# Patient Record
Sex: MALE
[De-identification: names, ages, dates, MRNs, and addresses within clinical notes are randomized per-mention and may not be internally consistent; named-entity substitution may affect disease eponyms.]

---

## 2018-03-01 ENCOUNTER — HOSPITAL ENCOUNTER (INPATIENT)
Dept: HOSPITAL 88 - ER | Age: 73
LOS: 3 days | Discharge: HOME | DRG: 690 | End: 2018-03-04
Attending: INTERNAL MEDICINE | Admitting: INTERNAL MEDICINE
Payer: MEDICARE

## 2018-03-01 VITALS — HEIGHT: 69 IN | WEIGHT: 265 LBS | BODY MASS INDEX: 39.25 KG/M2

## 2018-03-01 DIAGNOSIS — E86.0: ICD-10-CM

## 2018-03-01 DIAGNOSIS — I25.10: ICD-10-CM

## 2018-03-01 DIAGNOSIS — N17.9: ICD-10-CM

## 2018-03-01 DIAGNOSIS — N39.0: Primary | ICD-10-CM

## 2018-03-01 DIAGNOSIS — I11.9: ICD-10-CM

## 2018-03-01 DIAGNOSIS — E66.9: ICD-10-CM

## 2018-03-01 DIAGNOSIS — E11.65: ICD-10-CM

## 2018-03-01 DIAGNOSIS — I65.29: ICD-10-CM

## 2018-03-01 DIAGNOSIS — I73.9: ICD-10-CM

## 2018-03-01 LAB
ALBUMIN SERPL-MCNC: 3.8 G/DL (ref 3.5–5)
ALBUMIN/GLOB SERPL: 1.1 {RATIO} (ref 0.8–2)
ALP SERPL-CCNC: 46 IU/L (ref 40–150)
ALT SERPL-CCNC: 27 IU/L (ref 0–55)
ANION GAP SERPL CALC-SCNC: 17.7 MMOL/L (ref 8–16)
BASOPHILS # BLD AUTO: 0 10*3/UL (ref 0–0.1)
BASOPHILS NFR BLD AUTO: 0.3 % (ref 0–1)
BUN SERPL-MCNC: 30 MG/DL (ref 7–26)
BUN/CREAT SERPL: 16 (ref 6–25)
CALCIUM SERPL-MCNC: 10.5 MG/DL (ref 8.4–10.2)
CHLORIDE SERPL-SCNC: 103 MMOL/L (ref 98–107)
CK MB SERPL-MCNC: 5.2 NG/ML (ref 0–5)
CK MB SERPL-MCNC: 5.4 NG/ML (ref 0–5)
CK SERPL-CCNC: 182 IU/L (ref 30–200)
CK SERPL-CCNC: 193 IU/L (ref 30–200)
CO2 SERPL-SCNC: 26 MMOL/L (ref 22–29)
DEPRECATED APTT PLAS QN: 24.9 SECONDS (ref 23.8–35.5)
DEPRECATED INR PLAS: 1.09
DEPRECATED NEUTROPHILS # BLD AUTO: 10.8 10*3/UL (ref 2.1–6.9)
EGFRCR SERPLBLD CKD-EPI 2021: 35 ML/MIN (ref 60–?)
EOSINOPHIL # BLD AUTO: 0.1 10*3/UL (ref 0–0.4)
EOSINOPHIL NFR BLD AUTO: 0.4 % (ref 0–6)
ERYTHROCYTE [DISTWIDTH] IN CORD BLOOD: 13.7 % (ref 11.7–14.4)
GLOBULIN PLAS-MCNC: 3.6 G/DL (ref 2.3–3.5)
GLUCOSE SERPLBLD-MCNC: 137 MG/DL (ref 74–118)
HCT VFR BLD AUTO: 50 % (ref 38.2–49.6)
HGB BLD-MCNC: 16.9 G/DL (ref 14–18)
LYMPHOCYTES # BLD: 1.8 10*3/UL (ref 1–3.2)
LYMPHOCYTES NFR BLD AUTO: 12.8 % (ref 18–39.1)
MAGNESIUM SERPL-MCNC: 1.9 MG/DL (ref 1.3–2.1)
MCH RBC QN AUTO: 32.1 PG (ref 28–32)
MCHC RBC AUTO-ENTMCNC: 33.8 G/DL (ref 31–35)
MCV RBC AUTO: 95.1 FL (ref 81–99)
MONOCYTES # BLD AUTO: 1 10*3/UL (ref 0.2–0.8)
MONOCYTES NFR BLD AUTO: 7 % (ref 4.4–11.3)
NEUTS SEG NFR BLD AUTO: 78.9 % (ref 38.7–80)
PLATELET # BLD AUTO: 319 X10E3/UL (ref 140–360)
POTASSIUM SERPL-SCNC: 4.7 MMOL/L (ref 3.5–5.1)
PROTHROMBIN TIME: 13.3 SECONDS (ref 11.9–14.5)
RBC # BLD AUTO: 5.26 X10E6/UL (ref 4.3–5.7)
SODIUM SERPL-SCNC: 142 MMOL/L (ref 136–145)
TSH SERPL DL<=0.005 MIU/L-ACNC: 4.86 UIU/ML (ref 0.35–4.94)

## 2018-03-01 PROCEDURE — 85379 FIBRIN DEGRADATION QUANT: CPT

## 2018-03-01 PROCEDURE — 96372 THER/PROPH/DIAG INJ SC/IM: CPT

## 2018-03-01 PROCEDURE — 94640 AIRWAY INHALATION TREATMENT: CPT

## 2018-03-01 PROCEDURE — 97139 UNLISTED THERAPEUTIC PX: CPT

## 2018-03-01 PROCEDURE — 85025 COMPLETE CBC W/AUTO DIFF WBC: CPT

## 2018-03-01 PROCEDURE — 82550 ASSAY OF CK (CPK): CPT

## 2018-03-01 PROCEDURE — 86850 RBC ANTIBODY SCREEN: CPT

## 2018-03-01 PROCEDURE — 83880 ASSAY OF NATRIURETIC PEPTIDE: CPT

## 2018-03-01 PROCEDURE — 81001 URINALYSIS AUTO W/SCOPE: CPT

## 2018-03-01 PROCEDURE — 70551 MRI BRAIN STEM W/O DYE: CPT

## 2018-03-01 PROCEDURE — 71046 X-RAY EXAM CHEST 2 VIEWS: CPT

## 2018-03-01 PROCEDURE — 93005 ELECTROCARDIOGRAM TRACING: CPT

## 2018-03-01 PROCEDURE — 84439 ASSAY OF FREE THYROXINE: CPT

## 2018-03-01 PROCEDURE — 82948 REAGENT STRIP/BLOOD GLUCOSE: CPT

## 2018-03-01 PROCEDURE — 87186 SC STD MICRODIL/AGAR DIL: CPT

## 2018-03-01 PROCEDURE — 87086 URINE CULTURE/COLONY COUNT: CPT

## 2018-03-01 PROCEDURE — 85610 PROTHROMBIN TIME: CPT

## 2018-03-01 PROCEDURE — 83036 HEMOGLOBIN GLYCOSYLATED A1C: CPT

## 2018-03-01 PROCEDURE — 99284 EMERGENCY DEPT VISIT MOD MDM: CPT

## 2018-03-01 PROCEDURE — 93880 EXTRACRANIAL BILAT STUDY: CPT

## 2018-03-01 PROCEDURE — 85730 THROMBOPLASTIN TIME PARTIAL: CPT

## 2018-03-01 PROCEDURE — 82553 CREATINE MB FRACTION: CPT

## 2018-03-01 PROCEDURE — 86900 BLOOD TYPING SEROLOGIC ABO: CPT

## 2018-03-01 PROCEDURE — 80053 COMPREHEN METABOLIC PANEL: CPT

## 2018-03-01 PROCEDURE — 80048 BASIC METABOLIC PNL TOTAL CA: CPT

## 2018-03-01 PROCEDURE — 84484 ASSAY OF TROPONIN QUANT: CPT

## 2018-03-01 PROCEDURE — 87400 INFLUENZA A/B EACH AG IA: CPT

## 2018-03-01 PROCEDURE — 36415 COLL VENOUS BLD VENIPUNCTURE: CPT

## 2018-03-01 PROCEDURE — 82270 OCCULT BLOOD FECES: CPT

## 2018-03-01 PROCEDURE — 84443 ASSAY THYROID STIM HORMONE: CPT

## 2018-03-01 PROCEDURE — 83735 ASSAY OF MAGNESIUM: CPT

## 2018-03-01 PROCEDURE — 93306 TTE W/DOPPLER COMPLETE: CPT

## 2018-03-01 PROCEDURE — 71045 X-RAY EXAM CHEST 1 VIEW: CPT

## 2018-03-01 PROCEDURE — 96367 TX/PROPH/DG ADDL SEQ IV INF: CPT

## 2018-03-01 RX ADMIN — SODIUM CHLORIDE SCH MLS/HR: 9 INJECTION, SOLUTION INTRAVENOUS at 20:05

## 2018-03-01 RX ADMIN — INSULIN HUMAN SCH UNIT: 100 INJECTION, SOLUTION PARENTERAL at 22:30

## 2018-03-01 NOTE — XMS REPORT
Patient Summary Document

 Created on: 2018



ANDREW DONALDSON

External Reference #: [id number]

: 1945

Sex: Male



Demographics







 Address  [address] [place]  [address]

 

 Home Phone  [phone]

 

 Preferred Language  Unknown

 

 Marital Status  Unknown

 

 Judaism Affiliation  Unknown

 

 Race  Unknown

 

 Ethnic Group  Unknown





Author







 Author  [facility]

 

 Address  Unknown

 

 Phone  Unavailable







Care Team Providers







 Care Team Member Name  Role  Phone

 

 ROSA OKEEFE  Unavailable  Unavailable







Problems

This patient has no known problems.



Allergies, Adverse Reactions, Alerts

This patient has no known allergies or adverse reactions.



Medications

This patient has no known medications.



Results







 Test Description  Test Time  Test Comments  Text Results  Atomic Results  
Result Comments









 CHEST SINGLE (PORTABLE)            [place] [address]      Patient Name: ANDREW DONALDSON JR   MR #: [id number]    : 1945 Age/Sex: 72/M  Acct #: 
[id number] Req #: [id number]  Adm Physician:     Ordered by: ANDREW BARRAGAN 
NP  Report #: [id number]   Location: ER  Room/Bed:     __________________________
_________________________________________________________________________    
Procedure: [id number] DX/CHEST SINGLE (PORTABLE)  Exam Date: 18           
                 Exam Time: 1155       REPORT STATUS: Signed    PROCEDURE:   
CHEST SINGLE (PORTABLE)   TECHNIQUE:   Portable AP chest   INDICATION:   Low 
blood pressure   COMPARISON:   None.       FINDINGS:   Lungs are clear and 
symmetrically inflated. No pleural effusions.    Normal heart size, mediastinal 
contour, and pulmonary vasculature for    technique. Mild aortic arch 
calcification. Grossly intact skeleton.           CONCLUSION:   No acute 
abnormality.               Dictated by:  Gianluca Montilla M.D. on 3/01/2018 
at 12:25        Electronically approved by:  Gianluca Montilla M.D. on 3/01/
2018 at    12:25                Dictated By: GIANLUCA MONTILLA MD  Electronically 
Signed By: GIANLUCA MONTILLA MD on 18 1226  Transcribed By: LINDSAY on  1226       COPY TO:   ANDREW BARRAGAN NP

## 2018-03-01 NOTE — CONSULTATION
DATE OF CONSULTATION:    



No Dictation 00:03 seconds.

 





DD:  03/01/2018 20:32

DT:  03/01/2018 20:58

Job#:  [id number]

## 2018-03-01 NOTE — DIAGNOSTIC IMAGING REPORT
PROCEDURE:CHEST SINGLE (PORTABLE)

TECHNIQUE:Portable AP chest

INDICATION:Low blood pressure

COMPARISON:None.

 

FINDINGS:

Lungs are clear and symmetrically inflated. No pleural effusions. 

Normal heart size, mediastinal contour, and pulmonary vasculature for 

technique. Mild aortic arch calcification. Grossly intact skeleton.

 

 

CONCLUSION:

No acute abnormality.

 

 

 

Dictated by:  Elvis Montilla M.D. on 3/01/2018 at 12:25     

Electronically approved by:  Elvis Montilla M.D. on 3/01/2018 at 

12:25

## 2018-03-02 VITALS — SYSTOLIC BLOOD PRESSURE: 127 MMHG | DIASTOLIC BLOOD PRESSURE: 77 MMHG

## 2018-03-02 VITALS — DIASTOLIC BLOOD PRESSURE: 74 MMHG | SYSTOLIC BLOOD PRESSURE: 133 MMHG

## 2018-03-02 VITALS — SYSTOLIC BLOOD PRESSURE: 138 MMHG | DIASTOLIC BLOOD PRESSURE: 74 MMHG

## 2018-03-02 VITALS — SYSTOLIC BLOOD PRESSURE: 141 MMHG | DIASTOLIC BLOOD PRESSURE: 82 MMHG

## 2018-03-02 VITALS — SYSTOLIC BLOOD PRESSURE: 120 MMHG | DIASTOLIC BLOOD PRESSURE: 70 MMHG

## 2018-03-02 VITALS — SYSTOLIC BLOOD PRESSURE: 133 MMHG | DIASTOLIC BLOOD PRESSURE: 66 MMHG

## 2018-03-02 LAB
ANION GAP SERPL CALC-SCNC: 16.3 MMOL/L (ref 8–16)
BACTERIA URNS QL MICRO: (no result) /HPF
BASOPHILS # BLD AUTO: 0 10*3/UL (ref 0–0.1)
BASOPHILS NFR BLD AUTO: 0.4 % (ref 0–1)
BILIRUB UR QL: NEGATIVE
BUN SERPL-MCNC: 31 MG/DL (ref 7–26)
BUN/CREAT SERPL: 25 (ref 6–25)
CALCIUM SERPL-MCNC: 9.2 MG/DL (ref 8.4–10.2)
CHLORIDE SERPL-SCNC: 106 MMOL/L (ref 98–107)
CK MB SERPL-MCNC: 4.3 NG/ML (ref 0–5)
CK SERPL-CCNC: 236 IU/L (ref 30–200)
CLARITY UR: (no result)
CO2 SERPL-SCNC: 25 MMOL/L (ref 22–29)
COLOR UR: YELLOW
DEPRECATED NEUTROPHILS # BLD AUTO: 7.2 10*3/UL (ref 2.1–6.9)
EGFRCR SERPLBLD CKD-EPI 2021: 56 ML/MIN (ref 60–?)
EOSINOPHIL # BLD AUTO: 0.1 10*3/UL (ref 0–0.4)
EOSINOPHIL NFR BLD AUTO: 1.3 % (ref 0–6)
EPI CELLS URNS QL MICRO: (no result) /LPF
ERYTHROCYTE [DISTWIDTH] IN CORD BLOOD: 14 % (ref 11.7–14.4)
GLUCOSE SERPLBLD-MCNC: 157 MG/DL (ref 74–118)
HCT VFR BLD AUTO: 45 % (ref 38.2–49.6)
HGB BLD-MCNC: 14.5 G/DL (ref 14–18)
KETONES UR QL STRIP.AUTO: NEGATIVE
LEUKOCYTE ESTERASE UR QL STRIP.AUTO: (no result)
LYMPHOCYTES # BLD: 1.7 10*3/UL (ref 1–3.2)
LYMPHOCYTES NFR BLD AUTO: 16.2 % (ref 18–39.1)
MCH RBC QN AUTO: 32.1 PG (ref 28–32)
MCHC RBC AUTO-ENTMCNC: 32.2 G/DL (ref 31–35)
MCV RBC AUTO: 99.6 FL (ref 81–99)
MONOCYTES # BLD AUTO: 1.1 10*3/UL (ref 0.2–0.8)
MONOCYTES NFR BLD AUTO: 10.8 % (ref 4.4–11.3)
NEUTS SEG NFR BLD AUTO: 70.2 % (ref 38.7–80)
NITRITE UR QL STRIP.AUTO: POSITIVE
PLATELET # BLD AUTO: 269 X10E3/UL (ref 140–360)
POTASSIUM SERPL-SCNC: 5.3 MMOL/L (ref 3.5–5.1)
PROT UR QL STRIP.AUTO: (no result)
RBC # BLD AUTO: 4.52 X10E6/UL (ref 4.3–5.7)
SODIUM SERPL-SCNC: 142 MMOL/L (ref 136–145)
SP GR UR STRIP: 1.02 (ref 1.01–1.02)
T4 FREE SERPL-MCNC: 0.99 NG/DL (ref 0.9–1.8)
TSH SERPL DL<=0.005 MIU/L-ACNC: 0.8 UIU/ML (ref 0.35–4.94)
UROBILINOGEN UR STRIP-MCNC: 0.2 MG/DL (ref 0.2–1)
WBC #/AREA URNS HPF: >50 /HPF (ref 0–5)

## 2018-03-02 RX ADMIN — INSULIN LISPRO SCH UNIT: 100 INJECTION, SOLUTION INTRAVENOUS; SUBCUTANEOUS at 21:00

## 2018-03-02 RX ADMIN — INSULIN HUMAN SCH UNIT: 100 INJECTION, SOLUTION PARENTERAL at 07:54

## 2018-03-02 RX ADMIN — SIMVASTATIN SCH MG: 20 TABLET, FILM COATED ORAL at 21:00

## 2018-03-02 RX ADMIN — SODIUM CHLORIDE SCH GM: 9 INJECTION, SOLUTION INTRAVENOUS at 21:00

## 2018-03-02 RX ADMIN — SODIUM CHLORIDE SCH MLS/HR: 9 INJECTION, SOLUTION INTRAVENOUS at 02:30

## 2018-03-02 RX ADMIN — INSULIN LISPRO SCH UNIT: 100 INJECTION, SOLUTION INTRAVENOUS; SUBCUTANEOUS at 16:18

## 2018-03-02 RX ADMIN — INSULIN HUMAN SCH UNIT: 100 INJECTION, SOLUTION PARENTERAL at 12:41

## 2018-03-02 RX ADMIN — SODIUM CHLORIDE SCH GM: 9 INJECTION, SOLUTION INTRAVENOUS at 12:44

## 2018-03-02 NOTE — CONSULTATION
DATE OF CONSULTATION:  March 02, 2018 



ENDOCRINE CONSULTATION



This is a patient of Dr. Shearer.  Thank you very much for referring this 

patient.   



HISTORY OF PRESENT ILLNESS:  This is a 72-year-old white male who is 

referred to me for evaluation of diabetes mellitus.  The patient reported 

he is a known diabetic for almost 12 years and takes a combination of 

Levemir insulin 100 units at bedtime and 30 to 60 units of Humalog with 

each meal depending upon the blood sugars.  Patient came to the hospital 

with a history of syncopal episode and was found to have a urinary tract 

infection.  Patient also has history of coronary artery disease, congestive 

cardiac failure and stent placement.  He also has history of 

hyperlipidemia.  Patient also takes metformin at home.   



PHYSICAL EXAMINATION:  

GENERAL:  Today the patient is alert, awake, a little bit apprehensive.  He 

is moderately overweight.

VITAL SIGNS:  His heart rate is around 78.  His blood pressure is 140/80 

mmHg.

HEENT:  Examination essentially unremarkable.  Thyroid is palpable.  

Clinically he is near euthyroid. 

CHEST:  Bilateral vesicular breathing.  He has mild bronchospasm.  

CARDIAC:  Both 1st and 2nd heart sounds.  There is no 3rd or 4th heart 

sound.  Ejection sound grade 2/6.  

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 

lower extremities.



CLINICAL IMPRESSION:  

1. Diabetes mellitus type 2, uncontrolled with complications.  

2. Syncopal episode.

3. Hypotension.

4. Urinary tract infection.  

5. Coronary artery disease and congestive cardiac failure. 



The plan at this time is to do a hemoglobin A1c, thyroid function test.  

Hold the metformin for now because his creatinine was significantly 

elevated at the time of admission.  Will just start him on the Humalog and 

the Levemir combination but much lower dose.  



Thanks for referring this patient.  I will be following this patient with 

you.



 





DD:  03/02/2018 14:02

DT:  03/02/2018 14:08

Job#:  [id number]







VALERY

## 2018-03-02 NOTE — CONSULTATION
DATE OF CONSULTATION:  March 02, 2018, at 2:15 in the evening.



NEUROLOGICAL CONSULTATION 



This is a patient of Dr. Deborah Shearer.



REASON FOR CONSULTATION:  Hypertension, presyncope.



This is a 72-year-old male who has a many-year history of diabetes.  In the 

last several days, he started feeling generalized weak.  Because of that, 

they brought him to the hospital for further evaluation and management.  

The blood pressure apparently was low in the 70s.  The patient feels like 

he is going to black out but never passes out.  



PAST HISTORY:  He denies history of hypertension.  He has a history of 

diabetes mellitus and heart condition.  He has a history of coronary artery 

stent and carotid artery stent.  He has also a history of bilateral 

cataract surgery and tonsillectomy.



PERSONAL AND SOCIAL HISTORY:  He lives alone.  He does not smoke or drink.



PHYSICAL EXAMINATION 

VITAL SIGNS:  At the time of this examination, blood pressure is 141/82, 

pulse 78, temperature 100.5.  The patient is overweight.  He weighs about 

265 pounds.

LUNGS:  Clear to auscultation. 

HEART:  Regular sinus rhythm.  No murmur.

ABDOMEN:  Soft and nontender.  No organomegaly.  

MUSCULOSKELETAL:  Lower extremities have no edema, no cyanosis and no 

clubbing.



NEUROLOGIC EXAMINATION:  He is alert.  He is oriented times 3.  Speech is 

clear.  No dysarthria.  No dysphagia.

CRANIAL NERVES:  Pupils are both equal and reactive.  The extraocular 

movements are full.  Visual fields are normal.  No facial weakness.  Facial 

sensation noted.  Tongue protrudes in the midline.  Palatal movement is 

normal bilaterally.

MOTOR POWER:  Upper extremities show no evidence of muscle wasting in 

either the proximal or distal muscles.  Abduction of the arms 5/5.  Flexion 

and extension of the arms 5/5. Dorsiflexion of the wrists 5/5.  Finger 

extension 5/5.  Hand  5/5.  Lower extremities:  Flexion of the hips 

5/5.  Flexion and extension of the knees 5/5.  Dorsiflexion of the ankles 

5/5.  Plantar flexion 5/5.  Toe extension 5/5 bilaterally.  

DEEP TENDON REFLEXES:  Triceps, biceps and radials are 1+.  Knee jerks 

absent.  Ankle jerks absent bilaterally.  Plantar stimulation is down 

bilaterally.

COORDINATION:  Finger-to-nose is normal.  The patient has been ambulating 

with help of physical therapy today.  

HEAD:  Normocephalic. 

NECK:  Supple.  Carotid pulsations are present bilaterally.  There are no 

bruits.  



LABORATORY WORKUP:  CBC shows white count 10,200 with hemoglobin 14.5, 

hematocrit 45, platelets 269,000.  Chemistry:  Sodium and potassium normal. 

 BUN 31.8, creatinine 1.26.  Glucose 157.  Calcium 9.2.  Liver enzymes are 

normal.  



Urinalysis:  Protein 1+, blood 4+, number of WBCs greater than 50, bacteria 

moderately high.



MRI of the brain shows no evidence of acute pathology.  No acute infarction 

or acute hemorrhage.  There is moderate small vessel disease seen 

bilaterally.  



MRI of the brain, as mentioned, shows no evidence of acute pathology.  



Carotid Doppler shows no evidence of any major stenosis.  



IMPRESSION 

1. Episode of hypertension.

2. Rule out hypoglycemia.

3. Presyncope.

4. Diabetes mellitus, type 2.

5. Obesity.

6. Status post right carotid stent and coronary artery stent.



We discussed with the patient.  I do not think we are dealing with any 

cerebrovascular problem, no symptoms of TIA, no symptoms of possibility of 

stroke.  The patient was instructed to follow the advice, take his 

medications regularly, follow a diet to control the blood sugar and undergo 

any required laboratory evaluation.





 





DD:  03/02/2018 15:10

DT:  03/02/2018 15:16

Job#:  [id number]

## 2018-03-02 NOTE — HISTORY AND PHYSICAL
CHIEF COMPLAINT:  Dizziness, syncope.



HPI:  Mr. Bryant is a 72-year-old male who presented to the emergency room 

with episode of dizziness and syncope and blackout.  He has been having 

difficulty balancing himself for last few days, but today he had this near 

syncopal episode, so he decided to come to the emergency room.  He has a 

history of diabetes, hypertension, and coronary artery disease.  He also 

has carotid stents, and his cardiologist has told him that he has 

peripheral arterial disease as well.  He denies any chest pain, nausea, 

vomiting, diarrhea.



REVIEW OF SYSTEMS:

GENERAL:  Denies any fever or chills.

HEAD:  Denies any head trauma, head injury.

ENT:  Denies any earache, nosebleed, throat pain.

CVS:  Denies any chest pain.

RESPIRATORY:  Denies any shortness of breath.

GI:  Denies any nausea, vomiting.



The rest of the review of systems is negative except as in HPI.



PAST MEDICAL HISTORY:  Hypertension, coronary artery disease.



PAST SURGICAL HISTORY:  Knee replacement and tonsillectomy.



FAMILY AND SOCIAL HISTORY:  He lives by himself.  He is retired insurance 

.  He never smoked in his life.  Denies any alcohol use.  Family 

history is significant for hypertension and heart disease.



PHYSICAL EXAMINATION:

VITAL SIGNS:  Temperature 97.6, pulse is 78, blood pressure 127/67, 

respiratory rate of 18, O2 sat 96% on 2 liters.

SKIN:  Warm and dry.

CHEST:  Clear to auscultation bilaterally.  No wheezing.

NECK:  Supple.  No JVD.  Thyroid not enlarged.  No cervical 

lymphadenopathy.

ABDOMEN:  Soft, nontender, nondistended.  Bowel sounds audible.  No 

hepatosplenomegaly.

EXTREMITIES:  No clubbing, cyanosis.  Trace pedal edema.

NEUROLOGICAL:  Awake and alert.  No focal neurologic deficits.  Cranial 

nerves II through XII intact.



LABS:  White count of 13,000; hemoglobin 16.9; platelets 319,000.  

Chemistry:  Sodium 142, potassium 4.7, chloride 103, bicarb 26, BUN 30, 

creatinine 1.89.  AST and ALT normal.  CK-MB 5.20.  .2.  INR is 

1.09.  Influenza is negative.  Chest x-ray is not showing any infiltrate.



ASSESSMENT:  Mr. Bryant is a 72-year-old male who presented to the emergency 

room with syncope.  Patient has history of coronary artery disease, could 

be cardiogenic versus neurogenic, episodes of dizziness and imbalance for 

last few days.



PLAN:

1. Neurology consult, cardiology consult.

2. MRI of the head.

3. Will continue the patient on IV hydration.  Patient's creatinine is 

1.89.  Will follow closely.  May need nephrology evaluation as well, and 

the symptoms can be due to dehydration.









DD:  03/01/2018 20:36

DT:  03/01/2018 20:42

Job#:  [id number]

## 2018-03-02 NOTE — CONSULTATION
DATE OF CONSULTATION:  March 02, 2018 



CARDIOLOGY CONSULTATION 



ATTENDING PHYSICIAN:  Dr. Shearer.



Thank you so much for asking me to see this nice man in consultation. 



Mr. Bryant is a pleasant 72-year-old man who has been retired for many years 

and has adult-onset diabetes.  



CHIEF COMPLAINT:  He presented to the emergency room.  He reports that on 

Wednesday he was in his ophthalmologist's office, felt weak and went home.  

By Thursday morning, he reports that he did not feel any better, feeling 

weak and decided to come to the emergency room where they found 

hypotension.  



HISTORY OF PRESENT ILLNESS:  Patient denies any chest pain, palpitations or 

carson syncope.  He denies any dysuria.



PAST MEDICAL HISTORY:  Is complex.  Longstanding diabetes that he reports 

that he had his diabetic medications changed in the last weeks and months 

due to insurance change.  He had previously been using Levemir and Humalog 

but changed these to a combination of Novolin N and regular insulin.  He 

was cautioned against low blood sugars and checks his fingerstick blood 

sugar about 4 times a day.  Past medical history otherwise significant for 

carotid stenting in 1995 and with a coronary stent in 2003, both performed 

at [facility] in [facility] by Dr. Francis.  He has 

had remote tonsillectomy and remote knee surgeries with arthroscope on the 

right knee and open left knee surgery 1977.  He reports that he has had 

bilateral cataract surgeries with the right one going well, but on the left 

one in May of 2016 he has had continued problems with poor healing of his 

cornea or sclera.  Takes multiple eyedrops and insulins as mentioned above 

plus metformin 500 mg b.i.d. and simvastatin 20 mg daily.



PERSONAL AND SOCIAL HISTORY:  He does not smoke nor drink.  He lives at 

home with his wife.



REVIEW OF SYSTEMS:   

CARDIAC:  He denies any chest pain or palpitations since his coronary 

stenting.

NEUROLOGIC:  He reports that before the carotid stenting he had amaurosis 

but no repeat since that time. 

 

PHYSICAL EXAMINATION:  

GENERAL:  At this time shows a pleasant, obese man who is 5 feet 9 inches 

tall and weighs 265 pounds, sitting in a chair. 

VITALS:  Blood pressure 130/80.  Temp is 99.8. 

HEENT:  Unremarkable. 

NECK:  No jugular venous distention, no bruits. 

THORAX:   Heart sounds S1 and S2 are equal.  No murmurs.  Lungs are clear. 

ABDOMEN:  Markedly protuberant.  Normal bowel sounds. 

EXTREMITIES:  Show the healed incision on the left knee but no cyanosis, 

clubbing or edema.  



EKG shows sinus rhythm with PVCs and nonspecific ST and T-wave changes.  

Echocardiogram shows good left ventricular function and mild left 

ventricular hypertrophy.  Urinalysis shows 11 to 20 red blood cells per 

high power field, greater than 50 white cells, with 4+ blood and positive 

for nitrites.  Cardiac enzymes are normal times 3.  BNP is elevated at 748. 

 TSH is normal. 



ASSESSMENT:  

1. Hypotensive episode with blood pressure recorded at 71/41 on 

presenting to the emergency room.

2. Urinary tract infection.

3. Type 2 adult-onset diabetes with complex regimen including metformin 

and insulin.

4. Carotid disease, clinically stable after carotid stenting.

5. Coronary disease, clinically stable after coronary stenting.  



PLAN:   Will await urine culture and check carotid Doppler scan and monitor 

on telemetry. 



Thank you for asking me to see him in consultation.





 





DD:  03/02/2018 11:22

DT:  03/02/2018 12:17

Job#:  [id number]

## 2018-03-02 NOTE — DIAGNOSTIC IMAGING REPORT
Examination: MRI BRAIN WITHOUT CONTRAST

History: Dizziness. Imbalance. Near syncope.

Comparison studies: None



Technique: 

Sagittal T2; axial DWI, FLAIR, GRE or SWI, T1, Coronal FLAIR.

Intravenous contrast: None



Findings:



Scalp: No abnormal signal.  No masses.

Bone marrow: Normal in signal intensity.



Brain volume: Mild volume loss volume loss.

Ventricles: Normal in size and configuration.   No hydrocephalus.  



Extra-axial spaces:

No abnormalities.



Parenchyma:

There are patchy and confluent areas of T2/FLAIR hyperintensity in the

periventricular and subcortical white matter, nonspecific. 

Chronic lacunar infarct (7.5 mm anteroposterior dimension) in the left lateral

putamen.

No masses, hemorrhage, or acute vascular insults.



Suprasellar and sellar region: No abnormalities.

Craniocervical junction: No abnormalities. The foramen magnum is patent. No

Chiari malformations.

Vessels: Normal flow-voids in the arteries and sinuses.



Additional findings:Bilateral slitlike orbital lenses.



IMPRESSION:



1.  No acute intracranial abnormalities.



2.  Mild volume loss and microvascular ischemic change.



3.  Chronic lacunar infarct of the left putamen.



Signed by: Dr. Sue Boyd M.D. on 3/2/2018 1:07 PM

## 2018-03-02 NOTE — DIAGNOSTIC IMAGING REPORT
PROCEDURE: CHEST SINGLE (PORTABLE)

COMPARISON: Patients [facility], DX, CHEST SINGLE (PORTABLE), 

3/01/2018, 11:58.

INDICATIONS: SHORTNESS OF BREATH

 

FINDINGS:



LUNGS: Exam is characterized by a poor inspiration. There is right 

basilar subsegmental atelectasis. No consolidation.

 

PLEURA: No effusions or pneumothorax.

 

HEART \T\ 

MEDIASTINUM: The heart is within normal size-limits.

 

BONES \T\

SOFT TISSUES:  No acute findings.

 

 

CONCLUSION:

Right basilar subsegmental atelectasis. 

 

 

Chon Chi D.O.  

Dictated by:  Chon Chi D.O. on 3/02/2018 at 14:21     

Electronically approved by:  Chon Chi D.O. on 3/02/2018 at 14:21

## 2018-03-03 VITALS — SYSTOLIC BLOOD PRESSURE: 149 MMHG | DIASTOLIC BLOOD PRESSURE: 87 MMHG

## 2018-03-03 VITALS — DIASTOLIC BLOOD PRESSURE: 79 MMHG | SYSTOLIC BLOOD PRESSURE: 123 MMHG

## 2018-03-03 VITALS — SYSTOLIC BLOOD PRESSURE: 155 MMHG | DIASTOLIC BLOOD PRESSURE: 90 MMHG

## 2018-03-03 VITALS — SYSTOLIC BLOOD PRESSURE: 112 MMHG | DIASTOLIC BLOOD PRESSURE: 72 MMHG

## 2018-03-03 VITALS — DIASTOLIC BLOOD PRESSURE: 88 MMHG | SYSTOLIC BLOOD PRESSURE: 167 MMHG

## 2018-03-03 VITALS — SYSTOLIC BLOOD PRESSURE: 143 MMHG | DIASTOLIC BLOOD PRESSURE: 79 MMHG

## 2018-03-03 LAB
ANION GAP SERPL CALC-SCNC: 12.5 MMOL/L (ref 8–16)
BASOPHILS # BLD AUTO: 0 10*3/UL (ref 0–0.1)
BASOPHILS NFR BLD AUTO: 0.3 % (ref 0–1)
BUN SERPL-MCNC: 27 MG/DL (ref 7–26)
BUN/CREAT SERPL: 29 (ref 6–25)
CALCIUM SERPL-MCNC: 9.2 MG/DL (ref 8.4–10.2)
CHLORIDE SERPL-SCNC: 105 MMOL/L (ref 98–107)
CO2 SERPL-SCNC: 27 MMOL/L (ref 22–29)
DEPRECATED NEUTROPHILS # BLD AUTO: 6.7 10*3/UL (ref 2.1–6.9)
EGFRCR SERPLBLD CKD-EPI 2021: > 60 ML/MIN (ref 60–?)
EOSINOPHIL # BLD AUTO: 0.2 10*3/UL (ref 0–0.4)
EOSINOPHIL NFR BLD AUTO: 2 % (ref 0–6)
ERYTHROCYTE [DISTWIDTH] IN CORD BLOOD: 13.8 % (ref 11.7–14.4)
GLUCOSE SERPLBLD-MCNC: 159 MG/DL (ref 74–118)
HCT VFR BLD AUTO: 45.3 % (ref 38.2–49.6)
HGB BLD-MCNC: 14.7 G/DL (ref 14–18)
LYMPHOCYTES # BLD: 1.5 10*3/UL (ref 1–3.2)
LYMPHOCYTES NFR BLD AUTO: 16.2 % (ref 18–39.1)
MCH RBC QN AUTO: 31.7 PG (ref 28–32)
MCHC RBC AUTO-ENTMCNC: 32.5 G/DL (ref 31–35)
MCV RBC AUTO: 97.6 FL (ref 81–99)
MONOCYTES # BLD AUTO: 0.9 10*3/UL (ref 0.2–0.8)
MONOCYTES NFR BLD AUTO: 9.4 % (ref 4.4–11.3)
NEUTS SEG NFR BLD AUTO: 71.6 % (ref 38.7–80)
PLATELET # BLD AUTO: 237 X10E3/UL (ref 140–360)
POTASSIUM SERPL-SCNC: 4.5 MMOL/L (ref 3.5–5.1)
RBC # BLD AUTO: 4.64 X10E6/UL (ref 4.3–5.7)
SODIUM SERPL-SCNC: 140 MMOL/L (ref 136–145)

## 2018-03-03 RX ADMIN — SODIUM CHLORIDE SCH GM: 9 INJECTION, SOLUTION INTRAVENOUS at 08:18

## 2018-03-03 RX ADMIN — SODIUM CHLORIDE SCH GM: 9 INJECTION, SOLUTION INTRAVENOUS at 20:50

## 2018-03-03 RX ADMIN — SIMVASTATIN SCH MG: 20 TABLET, FILM COATED ORAL at 20:51

## 2018-03-03 RX ADMIN — Medication SCH ML: at 19:06

## 2018-03-03 RX ADMIN — INSULIN LISPRO SCH UNIT: 100 INJECTION, SOLUTION INTRAVENOUS; SUBCUTANEOUS at 11:30

## 2018-03-03 RX ADMIN — INSULIN LISPRO SCH UNIT: 100 INJECTION, SOLUTION INTRAVENOUS; SUBCUTANEOUS at 07:30

## 2018-03-03 RX ADMIN — Medication SCH ML: at 13:00

## 2018-03-03 RX ADMIN — INSULIN LISPRO SCH UNIT: 100 INJECTION, SOLUTION INTRAVENOUS; SUBCUTANEOUS at 16:30

## 2018-03-03 RX ADMIN — INSULIN LISPRO SCH UNIT: 100 INJECTION, SOLUTION INTRAVENOUS; SUBCUTANEOUS at 20:51

## 2018-03-03 RX ADMIN — INSULIN LISPRO SCH UNIT: 100 INJECTION, SOLUTION INTRAVENOUS; SUBCUTANEOUS at 17:05

## 2018-03-03 NOTE — CARDIOLOGY REPORT
DATE OF STUDY:  March 02, 2018 



DOPPLER SCAN OF CAROTIDS  



Left carotid artery shows velocity 1.4 meters per second in the left distal 

internal carotid artery.  Left vertebral flow appears antegrade.



Right carotid artery shows a stent in the mid right internal carotid artery 

with velocity 1.8 meters per second consistent with mild stenosis.  Right 

vertebral flow appears to be antegrade.



CONCLUSIONS 

1. A stent is present in the right mid internal carotid artery with 

velocity of 1.84 meters per second consistent with mild to moderate 

stenosis in the range of 50% to 70%.

2. Mild stenosis involving the left distal internal carotid artery with 

velocity of 1.4 meters per second.  The degree of stenosis is once again 

in the range of 50% to 70%.

3. Borderline elevated velocity in the right external carotid artery 

with velocity of 1.3 meters per second.

4. Vertebral flow appears to be in normal direction bilaterally.  







DD:  03/03/2018 14:07

DT:  03/03/2018 15:49

Job#:  [id number] 



cc:AMY COATES M.D.

## 2018-03-04 VITALS — SYSTOLIC BLOOD PRESSURE: 153 MMHG | DIASTOLIC BLOOD PRESSURE: 67 MMHG

## 2018-03-04 VITALS — DIASTOLIC BLOOD PRESSURE: 87 MMHG | SYSTOLIC BLOOD PRESSURE: 149 MMHG

## 2018-03-04 VITALS — DIASTOLIC BLOOD PRESSURE: 97 MMHG | SYSTOLIC BLOOD PRESSURE: 169 MMHG

## 2018-03-04 VITALS — SYSTOLIC BLOOD PRESSURE: 149 MMHG | DIASTOLIC BLOOD PRESSURE: 87 MMHG

## 2018-03-04 RX ADMIN — INSULIN LISPRO SCH UNIT: 100 INJECTION, SOLUTION INTRAVENOUS; SUBCUTANEOUS at 11:30

## 2018-03-04 RX ADMIN — Medication SCH ML: at 02:40

## 2018-03-04 RX ADMIN — SODIUM CHLORIDE SCH GM: 9 INJECTION, SOLUTION INTRAVENOUS at 08:40

## 2018-03-04 RX ADMIN — Medication SCH ML: at 07:00

## 2018-03-04 RX ADMIN — INSULIN LISPRO SCH UNIT: 100 INJECTION, SOLUTION INTRAVENOUS; SUBCUTANEOUS at 07:30

## 2018-03-04 RX ADMIN — INSULIN LISPRO SCH UNIT: 100 INJECTION, SOLUTION INTRAVENOUS; SUBCUTANEOUS at 08:00

## 2018-03-04 RX ADMIN — INSULIN LISPRO SCH UNIT: 100 INJECTION, SOLUTION INTRAVENOUS; SUBCUTANEOUS at 12:00

## 2018-03-04 NOTE — DIAGNOSTIC IMAGING REPORT
EXAM: Single AP view of the chest (Portable).



COMPARISON: Chest radiograph from 03/02/2018



INDICATION: Hypoxia



FINDINGS: Single portable AP view of the chest. The visualized bones and soft

tissues, cardiac silhouette ,  pleura appear unchanged.



IMPRESSION: 



1. Lines/tubes: None

2.  Worsening bibasilar airspace opacity with mild volume loss due to worsening

atelectasis or interval aspiration.



Signed by: Dr. Chula Mariee M.D. on 3/4/2018 7:55 AM

## 2018-03-04 NOTE — DISCHARGE SUMMARY
FINAL DIAGNOSES 

1. Urinary tract infection. 

2. Carotid stenosis. 

3. Hypertension. 

4. Diabetes.

5. Dehydration. 

6. Acute kidney injury, resolved with IV hydration.  

 

ADMISSION HISTORY AND HOSPITAL COURSE:   Mr. Bryant is a 72-year-old man who 

presented to the emergency room with dizziness and near syncope with 

hypotensive IV hydration given.  The patient was continued with IV Rocephin 

and the patient improved with treatment.  Dizziness resolved and renal 

failure resolved.  Cardiology, neurology and endocrinology evaluated the 

patient and cleared the patient for discharge.  The patient will be 

discharged home to follow up with his primary care physician.  Discharge 

medication reviewed.  





 _________________________________

AMY COATES MD



DD:  03/04/2018 13:14

DT:  03/04/2018 14:00

Job#:  [id number]

## 2018-12-13 ENCOUNTER — HOSPITAL ENCOUNTER (INPATIENT)
Dept: HOSPITAL 88 - ER | Age: 73
LOS: 8 days | Discharge: NURSING, REHAB OR LONG-TERM CARE | DRG: 871 | End: 2018-12-21
Attending: INTERNAL MEDICINE | Admitting: INTERNAL MEDICINE
Payer: MEDICARE

## 2018-12-13 VITALS — BODY MASS INDEX: 35.7 KG/M2 | WEIGHT: 241.06 LBS | HEIGHT: 69 IN

## 2018-12-13 VITALS — SYSTOLIC BLOOD PRESSURE: 110 MMHG | DIASTOLIC BLOOD PRESSURE: 69 MMHG

## 2018-12-13 DIAGNOSIS — A41.9: Primary | ICD-10-CM

## 2018-12-13 DIAGNOSIS — N18.3: ICD-10-CM

## 2018-12-13 DIAGNOSIS — K22.8: ICD-10-CM

## 2018-12-13 DIAGNOSIS — K44.9: ICD-10-CM

## 2018-12-13 DIAGNOSIS — G47.33: ICD-10-CM

## 2018-12-13 DIAGNOSIS — L02.611: ICD-10-CM

## 2018-12-13 DIAGNOSIS — E78.5: ICD-10-CM

## 2018-12-13 DIAGNOSIS — J18.9: ICD-10-CM

## 2018-12-13 DIAGNOSIS — J44.1: ICD-10-CM

## 2018-12-13 DIAGNOSIS — J96.00: ICD-10-CM

## 2018-12-13 DIAGNOSIS — Z79.4: ICD-10-CM

## 2018-12-13 DIAGNOSIS — E11.628: ICD-10-CM

## 2018-12-13 DIAGNOSIS — L02.612: ICD-10-CM

## 2018-12-13 DIAGNOSIS — E11.22: ICD-10-CM

## 2018-12-13 DIAGNOSIS — E66.01: ICD-10-CM

## 2018-12-13 DIAGNOSIS — K29.70: ICD-10-CM

## 2018-12-13 DIAGNOSIS — J44.0: ICD-10-CM

## 2018-12-13 DIAGNOSIS — I13.0: ICD-10-CM

## 2018-12-13 DIAGNOSIS — R65.20: ICD-10-CM

## 2018-12-13 DIAGNOSIS — I50.32: ICD-10-CM

## 2018-12-13 LAB
ALBUMIN SERPL-MCNC: 2.6 G/DL (ref 3.5–5)
ALBUMIN/GLOB SERPL: 0.5 {RATIO} (ref 0.8–2)
ALP SERPL-CCNC: 83 IU/L (ref 40–150)
ALT SERPL-CCNC: 60 IU/L (ref 0–55)
ANION GAP SERPL CALC-SCNC: 18.2 MMOL/L (ref 8–16)
ANISOCYTOSIS BLD QL SMEAR: SLIGHT
BACTERIA URNS QL MICRO: (no result) /HPF
BASE EXCESS BLDA CALC-SCNC: 4 MMOL/L (ref -2–3)
BASOPHILS # BLD AUTO: 0.1 10*3/UL (ref 0–0.1)
BASOPHILS NFR BLD AUTO: 0.5 % (ref 0–1)
BILIRUB UR QL: NEGATIVE
BNP BLD-MCNC: 338.7 PG/ML (ref 0–100)
BUN SERPL-MCNC: 43 MG/DL (ref 7–26)
BUN/CREAT SERPL: 34 (ref 6–25)
CALCIUM SERPL-MCNC: 10 MG/DL (ref 8.4–10.2)
CHLORIDE SERPL-SCNC: 96 MMOL/L (ref 98–107)
CK MB SERPL-MCNC: 10.9 NG/ML (ref 0–5)
CK MB SERPL-MCNC: 9.9 NG/ML (ref 0–5)
CK SERPL-CCNC: 657 IU/L (ref 30–200)
CK SERPL-CCNC: 681 IU/L (ref 30–200)
CLARITY UR: (no result)
CO2 SERPL-SCNC: 27 MMOL/L (ref 22–29)
COLOR UR: YELLOW
DEPRECATED APTT PLAS QN: 30.9 SECONDS (ref 23.8–35.5)
DEPRECATED INR PLAS: 1.76
DEPRECATED NEUTROPHILS # BLD AUTO: 13.5 10*3/UL (ref 2.1–6.9)
DEPRECATED RBC URNS MANUAL-ACNC: (no result) /HPF (ref 0–5)
EGFRCR SERPLBLD CKD-EPI 2021: 55 ML/MIN (ref 60–?)
EOSINOPHIL # BLD AUTO: 0 10*3/UL (ref 0–0.4)
EOSINOPHIL NFR BLD AUTO: 0 % (ref 0–6)
EPI CELLS URNS QL MICRO: (no result) /LPF
ERYTHROCYTE [DISTWIDTH] IN CORD BLOOD: 14 % (ref 11.7–14.4)
GLOBULIN PLAS-MCNC: 4.8 G/DL (ref 2.3–3.5)
GLUCOSE SERPLBLD-MCNC: 284 MG/DL (ref 74–118)
HCO3 BLDA-SCNC: 30 MMOL/L (ref 23–28)
HCT VFR BLD AUTO: 46.5 % (ref 38.2–49.6)
HGB BLD-MCNC: 15.5 G/DL (ref 14–18)
HYALINE CASTS #/AREA URNS LPF: (no result) /[LPF] (ref 0–1)
KETONES UR QL STRIP.AUTO: (no result)
LEUKOCYTE ESTERASE UR QL STRIP.AUTO: NEGATIVE
LYMPHOCYTES # BLD: 1.7 10*3/UL (ref 1–3.2)
LYMPHOCYTES NFR BLD AUTO: 10 % (ref 18–39.1)
LYMPHOCYTES NFR BLD MANUAL: 5 % (ref 19–48)
MAGNESIUM SERPL-MCNC: 2.2 MG/DL (ref 1.3–2.1)
MCH RBC QN AUTO: 33.3 PG (ref 28–32)
MCHC RBC AUTO-ENTMCNC: 33.3 G/DL (ref 31–35)
MCV RBC AUTO: 99.8 FL (ref 81–99)
MONOCYTES # BLD AUTO: 1.5 10*3/UL (ref 0.2–0.8)
MONOCYTES NFR BLD AUTO: 8.4 % (ref 4.4–11.3)
MONOCYTES NFR BLD MANUAL: 9 % (ref 3.4–9)
NEUTS BAND NFR BLD MANUAL: 1 %
NEUTS SEG NFR BLD AUTO: 78.6 % (ref 38.7–80)
NEUTS SEG NFR BLD MANUAL: 84 % (ref 40–74)
NITRITE UR QL STRIP.AUTO: NEGATIVE
PCO2 BLDA: 200 MMHG (ref 80–105)
PCO2 BLDA: 54 MMHG (ref 41–51)
PH BLDA: 7.35 [PH] (ref 7.31–7.41)
PLAT MORPH BLD: NORMAL
PLATELET # BLD AUTO: 380 X10E3/UL (ref 140–360)
PLATELET # BLD EST: ADEQUATE 10*3/UL
POLYCHROMASIA BLD QL SMEAR: (no result)
POTASSIUM SERPL-SCNC: 4.2 MMOL/L (ref 3.5–5.1)
PROT UR QL STRIP.AUTO: NEGATIVE
PROTHROMBIN TIME: 21.9 SECONDS (ref 11.9–14.5)
RBC # BLD AUTO: 4.66 X10E6/UL (ref 4.3–5.7)
RBC MORPH BLD: NORMAL
SAO2 % BLDA: 100 % (ref 95–98)
SODIUM SERPL-SCNC: 137 MMOL/L (ref 136–145)
SP GR UR STRIP: 1.01 (ref 1.01–1.02)
UROBILINOGEN UR STRIP-MCNC: 0.2 MG/DL (ref 0.2–1)
WBC #/AREA URNS HPF: (no result) /HPF (ref 0–5)

## 2018-12-13 PROCEDURE — 94640 AIRWAY INHALATION TREATMENT: CPT

## 2018-12-13 PROCEDURE — 83735 ASSAY OF MAGNESIUM: CPT

## 2018-12-13 PROCEDURE — 71260 CT THORAX DX C+: CPT

## 2018-12-13 PROCEDURE — 96372 THER/PROPH/DIAG INJ SC/IM: CPT

## 2018-12-13 PROCEDURE — 87205 SMEAR GRAM STAIN: CPT

## 2018-12-13 PROCEDURE — 85730 THROMBOPLASTIN TIME PARTIAL: CPT

## 2018-12-13 PROCEDURE — 84436 ASSAY OF TOTAL THYROXINE: CPT

## 2018-12-13 PROCEDURE — 87071 CULTURE AEROBIC QUANT OTHER: CPT

## 2018-12-13 PROCEDURE — 82553 CREATINE MB FRACTION: CPT

## 2018-12-13 PROCEDURE — 88312 SPECIAL STAINS GROUP 1: CPT

## 2018-12-13 PROCEDURE — 82607 VITAMIN B-12: CPT

## 2018-12-13 PROCEDURE — 83880 ASSAY OF NATRIURETIC PEPTIDE: CPT

## 2018-12-13 PROCEDURE — 83605 ASSAY OF LACTIC ACID: CPT

## 2018-12-13 PROCEDURE — 36600 WITHDRAWAL OF ARTERIAL BLOOD: CPT

## 2018-12-13 PROCEDURE — 84443 ASSAY THYROID STIM HORMONE: CPT

## 2018-12-13 PROCEDURE — 81001 URINALYSIS AUTO W/SCOPE: CPT

## 2018-12-13 PROCEDURE — 80053 COMPREHEN METABOLIC PANEL: CPT

## 2018-12-13 PROCEDURE — 43239 EGD BIOPSY SINGLE/MULTIPLE: CPT

## 2018-12-13 PROCEDURE — 71046 X-RAY EXAM CHEST 2 VIEWS: CPT

## 2018-12-13 PROCEDURE — 74220 X-RAY XM ESOPHAGUS 1CNTRST: CPT

## 2018-12-13 PROCEDURE — 82948 REAGENT STRIP/BLOOD GLUCOSE: CPT

## 2018-12-13 PROCEDURE — 93005 ELECTROCARDIOGRAM TRACING: CPT

## 2018-12-13 PROCEDURE — 80048 BASIC METABOLIC PNL TOTAL CA: CPT

## 2018-12-13 PROCEDURE — 84479 ASSAY OF THYROID (T3 OR T4): CPT

## 2018-12-13 PROCEDURE — 87086 URINE CULTURE/COLONY COUNT: CPT

## 2018-12-13 PROCEDURE — 36415 COLL VENOUS BLD VENIPUNCTURE: CPT

## 2018-12-13 PROCEDURE — 85610 PROTHROMBIN TIME: CPT

## 2018-12-13 PROCEDURE — 99284 EMERGENCY DEPT VISIT MOD MDM: CPT

## 2018-12-13 PROCEDURE — 87040 BLOOD CULTURE FOR BACTERIA: CPT

## 2018-12-13 PROCEDURE — 93970 EXTREMITY STUDY: CPT

## 2018-12-13 PROCEDURE — 94660 CPAP INITIATION&MGMT: CPT

## 2018-12-13 PROCEDURE — 85025 COMPLETE CBC W/AUTO DIFF WBC: CPT

## 2018-12-13 PROCEDURE — 87070 CULTURE OTHR SPECIMN AEROBIC: CPT

## 2018-12-13 PROCEDURE — 83036 HEMOGLOBIN GLYCOSYLATED A1C: CPT

## 2018-12-13 PROCEDURE — 71045 X-RAY EXAM CHEST 1 VIEW: CPT

## 2018-12-13 PROCEDURE — 83090 ASSAY OF HOMOCYSTEINE: CPT

## 2018-12-13 PROCEDURE — 88305 TISSUE EXAM BY PATHOLOGIST: CPT

## 2018-12-13 PROCEDURE — 80202 ASSAY OF VANCOMYCIN: CPT

## 2018-12-13 PROCEDURE — 82550 ASSAY OF CK (CPK): CPT

## 2018-12-13 PROCEDURE — 82805 BLOOD GASES W/O2 SATURATION: CPT

## 2018-12-13 PROCEDURE — 84484 ASSAY OF TROPONIN QUANT: CPT

## 2018-12-13 PROCEDURE — 82746 ASSAY OF FOLIC ACID SERUM: CPT

## 2018-12-13 RX ADMIN — SODIUM CHLORIDE SCH MLS/HR: 9 INJECTION, SOLUTION INTRAVENOUS at 13:15

## 2018-12-13 RX ADMIN — CEFEPIME HYDROCHLORIDE SCH MLS/HR: 1 INJECTION, POWDER, FOR SOLUTION INTRAMUSCULAR; INTRAVENOUS at 13:15

## 2018-12-13 NOTE — XMS REPORT
Koffi Barriga MD, PA

                             Created on: 2018



Godfrey Bryant JR

External Reference #: [id number]

: 1945

Sex: Male



Demographics







                          Address                   [address] [place]  [address]

 

                          Home Phone                [phone]

 

                          Preferred Language        Unknown

 

                          Marital Status            Unknown

 

                          Mormonism Affiliation     Unknown

 

                          Race                      Unknown

 

                          Ethnic Group              Non-





Author







                          Author                    Koffi Barriga

 

                          [facility]              [employer]

 

                          Address                   Unknown

 

                          Phone                     Unavailable







Care Team Providers







                    Care Team Member Name    Role                Phone

 

                    Koffi Barriga    CP                  Unavailable



                                                                



Allergies, Adverse Reactions, Alerts

          





                    Substance           Reaction            Event Type

 

                    N.K.D.A.            Info Not Available    Non Drug Allergy



                                                                                
       



Problems

          





             Problem Type    Condition    Code         Onset Dates    Condition Status

 

             Assessment    Body mass index (BMI) 38.0-38.9, adult    Z68.38                    Active

 

                          Assessment                Type 2 diabetes mellitus with diabetic peripheral angiopathy without

 gangrene           E11.51                                  Active

 

             Assessment    Coronary angioplasty status    Z98.61                    Active

 

             Problem      Chronic diastolic heart failure    428.32                    Active

 

             Assessment    Peripheral vascular disease, unspecified    I73.9                     Active

 

             Problem      Coronary angioplasty status    Z98.61                    Active

 

             Assessment    Occlusion and stenosis of right carotid artery    I65.21                    Active

 

             Problem      CHF, chronic systolic    I50.22                    Active

 

             Problem      Occlusion and stenosis of right carotid artery    I65.21                    Active

 

             Problem      Abnormal electrocardiogram [ECG] [EKG]    R94.31                    Active

 

             Problem      Atrial fibrillation- Chronic    I48.2                     Active

 

             Problem      Obesity, unspecified    E66.9                     Active

 

             Assessment    Paroxysmal atrial fibrillation    I48.0                     Active

 

                          Assessment                Atherosclerotic heart disease of native coronary artery without angina

 pectoris           I25.10                                  Active

 

             Problem      Paroxysmal atrial fibrillation    I48.0                     Active

 

             Assessment    CHF, chronic systolic    I50.22                    Active

 

             Problem      Peripheral vascular disease, unspecified    I73.9                     Active

 

                          Problem                   Type 2 diabetes mellitus with diabetic peripheral angiopathy without gangrene

                    E11.51                                  Active

 

             Problem      Body mass index (BMI) 38.0-38.9, adult    Z68.38                    Active

 

                          Problem                   Atherosclerotic heart disease of native coronary artery without angina 

pectoris            I25.10                                  Active

 

             Problem      Morbid obesity    278.01                    Active

 

             Problem      Mixed hyperlipidemia    272.2                     Active

 

             Problem      Coronary atherosclerosis of native coronary artery    414.01                    Active

 

             Problem      Peripheral vascular disease    443.9                     Active

 

             Problem      Postprocedural PCI status    V45.82                    Active

 

             Problem      Carotid stenosis    433.10                    Active

 

             Problem      Diabetes mellitus type II    250.00                    Active

 

             Problem      Abnormal ECG    794.31                    Active



                                                                                
                                                                                
                                                                                
                                                                                
                                      



Medications

          





        Medication    Code System    Code    Instructions    Start Date    End Date    Status    Dosage



 

        Simvastatin    NDC     81469713500    10 mg Orally Once a day                    Active    1 tablet in

 the evening

 

        Fish Oil    NDC     77922623443    500 MG Orally Twice a day                    Active    1 capsule

 

          Eliquis    NDC       34932403170    5 MG Orally twice a day (bid)    2018              Active

                                        1 tablet

 

        Clopidogrel Bisulfate    NDC     85682960528    75 mg Orally Once a day                    Active    

1 tablet

 

        Humalog    NDC     20168462097    100 UNIT/ML Subcutaneous                     Active    not defined

 

        Levemir    NDC     49923302015    100 UNIT/ML Subcutaneous                     Active    not defined

 

        Tradjenta    NDC     15436422927     Orally Once a day                    Active    1 tablet

 

        Losartan Potassium    NDC     46272517927    50 MG Orally Once a day                    Active    1 tablet



 

          Metoprolol Succinate ER    NDC       02039632969    100 MG Orally Once a day                        Active

                                        1 tablet

 

          Furosemide    NDC       09756742247    40 mg Orally Once a day    2018              Active 

                                        1 tablet

 

        Metformin HCl    NDC     63504046927    500 MG Orally Twice a day                    Active    1 tablet

 with meals



                                                                                
                                                                                
                                      



Vital Signs

          





                          Date/Time:                2018

 

                          BMI                       37.30 Index

 

                          Weight                    260 lbs

 

                          Height                    5ft 10in in

 

                          Cardiac Monitoring Heart Rate    84 /min

 

                          Blood Pressure Diastolic    70 mm Hg

 

                          Blood Pressure Systolic    110 mm Hg



                                                                              



Results

          No Known Results                                                      
              



Summary Purpose

          eClinicalWorks Submission

## 2018-12-13 NOTE — XMS REPORT
Koffi Barriga MD, PA

                             Created on: 2018



Godfrey Bryant

External Reference #: [id number]

: 1945

Sex: Male



Demographics







                          Address                   [address] [place]  [address]

 

                          Home Phone                [phone]

 

                          Preferred Language        Unknown

 

                          Marital Status            Unknown

 

                          Jewish Affiliation     Unknown

 

                          Race                      Unknown

 

                          Ethnic Group              Non-





Author







                          Author                    Koffi Barriga

 

                          [facility]              [employer]

 

                          Address                   Unknown

 

                          Phone                     Unavailable







Care Team Providers







                    Care Team Member Name    Role                Phone

 

                    Koffi Barriga                      Unavailable



                                                                



Allergies, Adverse Reactions, Alerts

          





                    Substance           Reaction            Event Type

 

                    N.K.D.A.            Info Not Available    Non Drug Allergy



                                                                                
       



Problems

          





             Problem Type    Condition    Code         Onset Dates    Condition Status

 

             Assessment    Body mass index (BMI) 38.0-38.9, adult    Z68.38                    Active

 

                          Assessment                Type 2 diabetes mellitus with diabetic peripheral angiopathy without

 gangrene           E11.51                                  Active

 

             Problem      Carotid stenosis    433.10                    Active

 

             Assessment    Coronary angioplasty status    Z98.61                    Active

 

             Problem      Chronic diastolic heart failure    428.32                    Active

 

             Assessment    Peripheral vascular disease, unspecified    I73.9                     Active

 

             Problem      Coronary angioplasty status    Z98.61                    Active

 

             Problem      Abnormal electrocardiogram [ECG] [EKG]    R94.31                    Active

 

             Problem      CHF, chronic systolic    I50.22                    Active

 

             Problem      Obesity, unspecified    E66.9                     Active

 

             Problem      Body mass index (BMI) 38.0-38.9, adult    Z68.38                    Active

 

                          Assessment                Atherosclerotic heart disease of native coronary artery without angina

 pectoris           I25.10                                  Active

 

             Assessment    CHF, chronic systolic    I50.22                    Active

 

             Problem      Atrial fibrillation- Chronic    I48.2                     Active

 

             Assessment    Occlusion and stenosis of right carotid artery    I65.21                    Active

 

                          Problem                   Type 2 diabetes mellitus with diabetic peripheral angiopathy without gangrene

                    E11.51                                  Active

 

             Problem      Occlusion and stenosis of right carotid artery    I65.21                    Active

 

                          Problem                   Atherosclerotic heart disease of native coronary artery without angina 

pectoris            I25.10                                  Active

 

             Problem      Peripheral vascular disease, unspecified    I73.9                     Active

 

             Problem      Peripheral vascular disease    443.9                     Active

 

             Problem      Morbid obesity    278.01                    Active

 

             Assessment    Atrial fibrillation- Chronic    I48.2                     Active

 

             Problem      Coronary atherosclerosis of native coronary artery    414.01                    Active

 

             Problem      Abnormal ECG    794.31                    Active

 

             Problem      Postprocedural PCI status    V45.82                    Active

 

             Problem      Mixed hyperlipidemia    272.2                     Active

 

             Problem      Diabetes mellitus type II    250.00                    Active



                                                                                
                                                                                
                                                                                
                                                                                
                            



Medications

          





        Medication    Code System    Code    Instructions    Start Date    End Date    Status    Dosage



 

        Clopidogrel Bisulfate    NDC     11078051628    75 mg Orally Once a day                    Active    

1 tablet

 

        Losartan Potassium    NDC     73039663839    50 MG Orally Once a day                    Active    1 tablet



 

        Fish Oil    NDC     54673217705    500 MG Orally Twice a day                    Active    1 capsule

 

        Metformin HCl    NDC     13995154183    500 MG Orally Twice a day                    Active    1 tablet

 with meals

 

        Tradjenta    NDC     81395098094     Orally Once a day                    Active    1 tablet

 

          Metoprolol Succinate ER    NDC       90033548757    100 MG Orally Once a day                        Active

                                        1 tablet

 

          Furosemide    NDC       41234566785    40 mg Orally Once a day    2018              Active 

                                        1 tablet

 

        Humalog    NDC     00934294975    100 UNIT/ML Subcutaneous                     Active    not defined

 

        Simvastatin    NDC     82312738586    10 mg Orally Once a day                    Active    1 tablet in

 the evening

 

        Levemir    NDC     90080645804    100 UNIT/ML Subcutaneous                     Active    not defined

 

        Aspirin    NDC     29110339979    81 MG Orally Once a day            2018    Inactive    

1 tablet

 

          Eliquis    NDC       47251614881    5 MG Orally twice a day (bid)    2018              Active

                                        1 tablet



                                                                                
                                                                                
                                                



Vital Signs

          





                          Date/Time:                2018

 

                          BMI                       38.74 Index

 

                          Weight                    270 lbs

 

                          Height                    5ft 10in in

 

                          Cardiac Monitoring Heart Rate    92 /min

 

                          Blood Pressure Diastolic    80 mm Hg

 

                          Blood Pressure Systolic    140 mm Hg



                                                                              



Results

          No Known Results                                                      
              



Summary Purpose

          eClinicalWorks Submission

## 2018-12-13 NOTE — XMS REPORT
[facility]patient Providers [facility]

                             Created on: 2017



Godfrey Bryant

External Reference #: [id number]

: 1945

Sex: Male



Demographics







                          Address                   [address] [place]  [address]

 

                          Home Phone                [phone]

 

                          Preferred Language        Unknown

 

                          Marital Status            Unknown

 

                          Judaism Affiliation     Unknown

 

                          Race                      Unknown

 

                          Ethnic Group              Non-





Author







                          Author                    Rick Dozier

 

                          Organization              [employer]

 

                          Address                   Unknown

 

                          Phone                     Unavailable







Care Team Providers







                    Care Team Member Name    Role                Phone

 

                    Rick Dozier      CP                  Unavailable



                                                                



Allergies, Adverse Reactions, Alerts

          





                    Substance           Reaction            Event Type

 

                    N.K.D.A.            Info Not Available    Non Drug Allergy



                                                                                
       



Problems

          





             Problem Type    Condition    Code         Onset Dates    Condition Status

 

             Assessment    PAD (peripheral artery disease)    I73.9                     Active

 

             Assessment    Pain in left knee    M25.562                   Active

 

             Assessment    Other chronic pain    G89.29                    Active

 

             Assessment    Diabetes     E11.9                     Active

 

             Problem      PAD (peripheral artery disease)    I73.9                     Active

 

             Problem      Carotid arterial disease    I77.9                     Active

 

             Problem      Other chronic pain    G89.29                    Active

 

             Problem      Diabetes     E11.9                     Active

 

             Assessment    BPH (benign prostatic hyperplasia)    N40.0                     Active

 

             Problem      HTN (hypertension)    I10                       Active

 

             Problem      BPH (benign prostatic hyperplasia)    N40.0                     Active



                                                                                
                                                                                
                          



Medications

          





        Medication    Code System    Code    Instructions    Start Date    End Date    Status    Dosage



 

        Tamsulosin HCl    NDC     89385666134    0.4 MG Orally Once a day                    Active    1 capsule



 

        Ramipril    NDC     [address]-66                            Active    not defined

 

        Metformin HCl    NDC     [address]-68                            Active    not defined

 

        Metoprolol Succinate ER    NDC     [address]-48                            Active    not defined

 

        Simvastatin    NDC     [address]-42                            Active    not defined

 

        Clopidogrel Bisulfate    NDC     [address]-81                            Active    not defined



                                                                                
                                                                   



Vital Signs

          





                          Date/Time:                Dec 05, 2017

 

                          BMI                       38.16 Index

 

                          Weight                    266 lbs

 

                          Height                    70 in

 

                          Temperature               97.5 F

 

                          Blood Pressure Diastolic    91 mm Hg

 

                          Blood Pressure Systolic    164 mm Hg



                                                                              



Results

          No Known Results                                                      
             



Summary Purpose

          [employer]ClinicalWorks Submission

## 2018-12-13 NOTE — XMS REPORT
Koffi Barriga MD, PA

                             Created on: 2018



Godfrey Bryant

External Reference #: [id number]

: 1945

Sex: Male



Demographics







                          Address                   [address] [place]  [address]

 

                          Home Phone                [phone]

 

                          Preferred Language        Unknown

 

                          Marital Status            Unknown

 

                          Latter-day Affiliation     Unknown

 

                          Race                      Unknown

 

                          Ethnic Group              Non-





Author







                          Author                    Koffi Barriga

 

                          Organization              [employer]

 

                          Address                   Unknown

 

                          Phone                     Unavailable







Care Team Providers







                    Care Team Member Name    Role                Phone

 

                    Koffi Barriga    CP                  Unavailable



                                                                



Allergies

          No Known Allergies                                                    
                                   



Problems

          





             Problem Type    Condition    Code         Onset Dates    Condition Status

 

             Problem      Chronic diastolic heart failure    428.32                    Active

 

             Problem      Occlusion and stenosis of right carotid artery    I65.21                    Active

 

             Problem      Abnormal electrocardiogram [ECG] [EKG]    R94.31                    Active

 

             Problem      Obesity, unspecified    E66.9                     Active

 

             Problem      CHF, chronic systolic    I50.22                    Active

 

             Problem      Body mass index (BMI) 38.0-38.9, adult    Z68.38                    Active

 

             Problem      Peripheral vascular disease, unspecified    I73.9                     Active

 

                          Problem                   Type 2 diabetes mellitus with diabetic peripheral angiopathy without gangrene

                    E11.51                                  Active

 

             Problem      Coronary angioplasty status    Z98.61                    Active

 

                          Problem                   Atherosclerotic heart disease of native coronary artery without angina 

pectoris            I25.10                                  Active

 

             Problem      Coronary atherosclerosis of native coronary artery    414.01                    Active

 

             Problem      Peripheral vascular disease    443.9                     Active

 

             Problem      Diabetes mellitus type II    250.00                    Active

 

             Problem      Abnormal ECG    794.31                    Active

 

             Problem      Morbid obesity    278.01                    Active

 

             Problem      Postprocedural PCI status    V45.82                    Active

 

             Problem      Mixed hyperlipidemia    272.2                     Active

 

             Problem      Carotid stenosis    433.10                    Active



                                                                                
                                                                                
                                                                                
                  



Medications

          





        Medication    Code System    Code    Instructions    Start Date    End Date    Status    Dosage



 

        Clopidogrel Bisulfate    NDC     32497764155    75 mg Orally Once a day                    Active    

1 tablet

 

        Simvastatin    NDC     75336479167    10 mg Orally Once a day                    Active    1 tablet in

 the evening



                                                                                
        



Results

          No Known Results                                                      
              



Summary Purpose

          eClinicalWorks Submission

## 2018-12-13 NOTE — XMS REPORT
[facility]

                             Created on: 2018



Godfrey Bryant

External Reference #: [id number]

: 1945

Sex: Male



Demographics







                          Address                   [address] [place]  [address]

 

                          Home Phone                [phone]

 

                          Preferred Language        Unknown

 

                          Marital Status            Unknown

 

                          Religion Affiliation     Unknown

 

                          Race                      Unknown

 

                          Ethnic Group              Non-





Author







                          Author                    Rick Dozier

 

                          Organization              [employer]

 

                          Address                   Unknown

 

                          Phone                     Unavailable







Care Team Providers







                    Care Team Member Name    Role                Phone

 

                    Rick Dozier      CP                  Unavailable



                                                                



Allergies, Adverse Reactions, Alerts

          





                    Substance           Reaction            Event Type

 

                    N.K.D.A.            Info Not Available    Non Drug Allergy



                                                                                
       



Problems

          





             Problem Type    Condition    Code         Onset Dates    Condition Status

 

             Assessment    Fall, initial encounter    W19.XXXA                  Active

 

             Assessment    BPH (benign prostatic hyperplasia)    N40.0                     Active

 

             Problem      PAD (peripheral artery disease)    I73.9                     Active

 

             Problem      Carotid arterial disease    I77.9                     Active

 

             Problem      Other chronic pain    G89.29                    Active

 

             Problem      Diabetes     E11.9                     Active

 

             Assessment    Acute pain of right shoulder    M25.511                   Active

 

             Problem      HTN (hypertension)    I10                       Active

 

             Problem      BPH (benign prostatic hyperplasia)    N40.0                     Active



                                                                                
                                                                                
      



Medications

          





        Medication    Code System    Code    Instructions    Start Date    End Date    Status    Dosage



 

        Clopidogrel Bisulfate    NDC     [address]-23                            Active    not defined

 

        Metoprolol Succinate ER    NDC     [address]-47                            Active    not defined

 

        Metformin HCl    NDC     12830295319    500 MG Orally Twice a day                    Active    1 tablet

 with meals

 

        Tamsulosin HCl    NDC     06091415254    0.4 MG Orally Once a day                    Active    1 capsule



 

        Simvastatin    NDC     [address]-31                            Active    not defined

 

        Ramipril    NDC     [address]-87                            Active    not defined

 

        Losartan Potassium    NDC     [address]-88                            Active    not defined

 

             Tramadol HCl    NDC          09618301872    50 MG Orally every 12 hrs as needed    May 31, 2018

                    Vicky 10, 2018       Active              1 tablet as needed



                                                                                
                                                                                
      



Vital Signs

          





                          Date/Time:                May 31, 2018

 

                          BMI                       37.73 Index

 

                          Weight                    263 lbs

 

                          Height                    70 in

 

                          Temperature               98.4 F

 

                          Blood Pressure Diastolic    89 mm Hg

 

                          Blood Pressure Systolic    150 mm Hg



                                                                              



Results

          No Known Results                                                      
             



Summary Purpose

          [employer] Submission

## 2018-12-13 NOTE — XMS REPORT
[facility]

                             Created on: 2017



Godfrey Bryant

External Reference #: [id number]

: 1945

Sex: Male



Demographics







                          Address                   [address] [place]  [address]

 

                          Home Phone                [phone]

 

                          Preferred Language        Unknown

 

                          Marital Status            Unknown

 

                          Tenriism Affiliation     Unknown

 

                          Race                      Unknown

 

                          Ethnic Group              Non-





Author







                          Author                    Koffi Barriga

 

                          [facility]              [employer]

 

                          Address                   Unknown

 

                          Phone                     Unavailable







Care Team Providers







                    Care Team Member Name    Role                Phone

 

                    Koffi Barriga                      Unavailable



                                                                



Allergies, Adverse Reactions, Alerts

          





                    Substance           Reaction            Event Type

 

                    N.K.D.A.            Info Not Available    Non Drug Allergy



                                                                    



Encounters

          





                    Encounter           Location            Date

 

                     echo/carotid/arterial dopplers    [facility]    2016

 

                    Follow-Up           [facility]    2016

 

                    Follow-Up           [facility]    2017

 

                    TROUBLE URINATING    [facility]    2015

 

                    Unknown             [facility]    2015

 

                    Follow-Up           [facility]    2016



                                                                                
                                                         



Problems

          





             Problem Type    Condition    ICD-9 Code    Onset Dates    Condition Status

 

             Problem      Postprocedural PCI status    V45.82                    Active

 

             Problem      Chronic diastolic heart failure    428.32                    Active

 

             Problem      Coronary atherosclerosis of native coronary artery    414.01                    Active

 

             Problem      CHF, chronic systolic    I50.22                    Active

 

             Assessment    Coronary angioplasty status    Z98.61                    Active

 

             Problem      Occlusion and stenosis of right carotid artery    I65.21                    Active

 

             Assessment    Abnormal electrocardiogram [ECG] [EKG]    R94.31                    Active

 

                          Assessment                Type 2 diabetes mellitus with diabetic peripheral angiopathy without

 gangrene           E11.51                                  Active

 

                          Problem                   Atherosclerotic heart disease of native coronary artery without angina 

pectoris            I25.10                                  Active

 

             Problem      Abnormal electrocardiogram [ECG] [EKG]    R94.31                    Active

 

                          Problem                   Type 2 diabetes mellitus with diabetic peripheral angiopathy without gangrene

                    E11.51                                  Active

 

             Problem      Peripheral vascular disease, unspecified    I73.9                     Active

 

             Problem      Coronary angioplasty status    Z98.61                    Active

 

             Assessment    CHF, chronic systolic    I50.22                    Active

 

                          Assessment                Atherosclerotic heart disease of native coronary artery without angina

 pectoris           I25.10                                  Active

 

             Assessment    Peripheral vascular disease, unspecified    I73.9                     Active

 

             Assessment    Occlusion and stenosis of right carotid artery    I65.21                    Active

 

             Problem      Mixed hyperlipidemia    272.2                     Active

 

             Problem      Abnormal ECG    794.31                    Active

 

             Problem      Diabetes mellitus type II    250.00                    Active

 

             Problem      Carotid stenosis    433.10                    Active

 

             Problem      Morbid obesity    278.01                    Active

 

             Problem      Peripheral vascular disease    443.9                     Active



                                                                                
                                                                                
                                                                                
                                                                    



Medications

          





        Medication    Code System    Code    Instructions    Start Date    End Date    Status    Dosage



 

        Levemir    [facility]    [address]-84    100 UNIT/ML Subcutaneous                     Active    Unknown



 

          Metformin HCl    [facility]    [address]-62    500 MG Orally Twice a day                        Active 

                                        1 tablet with meals

 

        Fish Oil    [facility]    [address]-96    500 MG Orally Twice a day                    Active    1 capsule



 

           Metoprolol Succinate ER    [facility]    [address]-82    50 MG Orally Once a day                 

                          Active                    1 tablet

 

        Tradjenta    [facility]    [address]-94     Orally Once a day                    Active    1 tablet

 

          Clopidogrel Bisulfate    [facility]    [address]-45    75 MG Orally Once a day                        Active

                                        1 tablet

 

        Simvastatin    [facility]    [address]-67    10 mg Orally Once a day                    Active    1 tablet

 in the evening

 

        Humalog    [facility]    [address]-75    100 UNIT/ML Subcutaneous                     Active    Unknown



 

        Ramipril    [facility]    [address]-20    10 MG Orally Once a day                    Active    1 capsule



 

        Aspirin    [facility]    [address]    81 MG Orally Once a day                    Active    1 tablet



                                                                                
                                                                                
                  



Social History

          





                    Social History Element    Qualifiers          Date Reported

 

                    Tobacco Use:        .  Are you a: never smoker    2017

 

                    Marital Status:     .           2017

 

                    Do you drink alcohol?    .  Status: No       2017



                                                                                
                                      



Vital Signs

          





                          Date/Time:                2017

 

                          Weight                    265 lbs

 

                          Cardiac Monitoring Heart Rate    76 /min

 

                          Blood Pressure Diastolic    85 mm Hg

 

                          Blood Pressure Systolic    133 mm Hg



                                                                    



Summary Purpose

          [employer] Submission

## 2018-12-13 NOTE — XMS REPORT
[facility]

                             Created on: 2016



Gdofrey Bryant

External Reference #: [id number]

: 1945

Sex: Male



Demographics







                          Address                   [address] [place]  [address]

 

                          Home Phone                [phone]

 

                          Preferred Language        Unknown

 

                          Marital Status            Unknown

 

                          Buddhism Affiliation     Unknown

 

                          Race                      Unknown

 

                          Ethnic Group              Non-





Author







                          Author                    Koffi Barriga

 

                          Organization              [employer]

 

                          Address                   Unknown

 

                          Phone                     Unavailable







Care Team Providers







                    Care Team Member Name    Role                Phone

 

                    Koffi Barriga                      Unavailable



                                                                



Allergies, Adverse Reactions, Alerts

          





                    Substance           Reaction            Event Type

 

                    N.K.D.A.            Info Not Available    Non Drug Allergy



                                                                    



Encounters

          





                    Encounter           Location            Date

 

                     echo/carotid/arterial dopplers    [facility]    2016

 

                    Follow-Up           [facility]    2016

 

                    TROUBLE URINATING    [facility]    2015

 

                    Unknown             [facility]    2015

 

                    Follow-Up           [facility]    2016



                                                                                
                                               



Problems

          





             Problem Type    Condition    ICD-9 Code    Onset Dates    Condition Status

 

             Problem      Postprocedural PCI status    V45.82                    Active

 

             Problem      Chronic diastolic heart failure    428.32                    Active

 

             Problem      Coronary atherosclerosis of native coronary artery    414.01                    Active

 

             Problem      CHF, chronic systolic    I50.22                    Active

 

             Assessment    Coronary angioplasty status    Z98.61                    Active

 

             Problem      Occlusion and stenosis of right carotid artery    I65.21                    Active

 

             Assessment    Abnormal electrocardiogram [ECG] [EKG]    R94.31                    Active

 

                          Assessment                Type 2 diabetes mellitus with diabetic peripheral angiopathy without

 gangrene           E11.51                                  Active

 

                          Problem                   Atherosclerotic heart disease of native coronary artery without angina 

pectoris            I25.10                                  Active

 

             Problem      Abnormal electrocardiogram [ECG] [EKG]    R94.31                    Active

 

                          Problem                   Type 2 diabetes mellitus with diabetic peripheral angiopathy without gangrene

                    E11.51                                  Active

 

             Problem      Peripheral vascular disease, unspecified    I73.9                     Active

 

             Problem      Coronary angioplasty status    Z98.61                    Active

 

             Assessment    CHF, chronic systolic    I50.22                    Active

 

                          Assessment                Atherosclerotic heart disease of native coronary artery without angina

 pectoris           I25.10                                  Active

 

             Assessment    Peripheral vascular disease, unspecified    I73.9                     Active

 

             Assessment    Occlusion and stenosis of right carotid artery    I65.21                    Active

 

             Problem      Mixed hyperlipidemia    272.2                     Active

 

             Problem      Abnormal ECG    794.31                    Active

 

             Problem      Diabetes mellitus type II    250.00                    Active

 

             Problem      Carotid stenosis    433.10                    Active

 

             Problem      Morbid obesity    278.01                    Active

 

             Problem      Peripheral vascular disease    443.9                     Active



                                                                                
                                                                                
                                                                                
                                                                    



Medications

          





        Medication    Code System    Code    Instructions    Start Date    End Date    Status    Dosage



 

        Simvastatin    [facility]    [address]-22    10 mg Orally Once a day                    Active    1 tablet

 in the evening

 

        Ramipril    [facility]    [address]-20    10 MG Orally Once a day                    Active    1 capsule



 

        Levemir    [facility]    [address]-53    100 UNIT/ML Subcutaneous                     Active    Unknown



 

        Humalog    [facility]    [address]-07    100 UNIT/ML Subcutaneous                     Active    Unknown



 

           Metoprolol Succinate ER    [facility]    [address]-90    50 MG Orally Once a day                 

                          Active                    1 tablet

 

          Clopidogrel Bisulfate    [facility]    [address]-36    75 MG Orally Once a day                        Active

                                        1 tablet

 

          Metformin HCl    [facility]    [address]-24    500 MG Orally Twice a day                        Active 

                                        1 tablet with meals

 

        Tradjenta    [facility]    [address]-04     Orally Once a day                    Active    1 tablet

 

        Fish Oil    [facility]    [address]-82    500 MG Orally Twice a day                    Active    1 capsule



 

        Aspirin    [facility]    [address]679    81 MG Orally Once a day                    Active    1 tablet



                                                                                
                                                                                
                  



Social History

          





                    Social History Element    Qualifiers          Date Reported

 

                    Tobacco Use:        .  Are you a: never smoker    2016

 

                    Marital Status:     .           2016

 

                    Do you drink alcohol?    .  Status: No       2016



                                                                                
                                      



Vital Signs

          





                          Date/Time:                2016

 

                          Weight                    270 lbs

 

                          Cardiac Monitoring Heart Rate    85 /min

 

                          Blood Pressure Diastolic    85 mm Hg

 

                          Blood Pressure Systolic    115 mm Hg



                                                                    



Summary Purpose

          [employer] Submission

## 2018-12-13 NOTE — XMS REPORT
Koffi Barriga MD, PA

                             Created on: 10/12/2017



Godfrey Bryant

External Reference #: [id number]

: 1945

Sex: Male



Demographics







                          Address                   [address] [place]  [address]

 

                          Home Phone                [phone]

 

                          Preferred Language        Unknown

 

                          Marital Status            Unknown

 

                          Temple Affiliation     Unknown

 

                          Race                      Unknown

 

                          Ethnic Group              Non-





Author







                          Author                    Koffi Barriga

 

                          [facility]              [employer]

 

                          Address                   Unknown

 

                          Phone                     Unavailable







Care Team Providers







                    Care Team Member Name    Role                Phone

 

                    Koffi Barriga                      Unavailable



                                                                



Allergies, Adverse Reactions, Alerts

          





                    Substance           Reaction            Event Type

 

                    N.K.D.A.            Info Not Available    Non Drug Allergy



                                                                                
       



Problems

          





             Problem Type    Condition    Code         Onset Dates    Condition Status

 

             Assessment    Body mass index (BMI) 38.0-38.9, adult    Z68.38                    Active

 

             Assessment    Obesity, unspecified    E66.9                     Active

 

             Problem      Postprocedural PCI status    V45.82                    Active

 

                          Assessment                Type 2 diabetes mellitus with diabetic peripheral angiopathy without

 gangrene           E11.51                                  Active

 

             Problem      Carotid stenosis    433.10                    Active

 

             Assessment    Abnormal electrocardiogram [ECG] [EKG]    R94.31                    Active

 

             Problem      Chronic diastolic heart failure    428.32                    Active

 

             Problem      Occlusion and stenosis of right carotid artery    I65.21                    Active

 

             Problem      Abnormal electrocardiogram [ECG] [EKG]    R94.31                    Active

 

             Problem      Obesity, unspecified    E66.9                     Active

 

             Problem      CHF, chronic systolic    I50.22                    Active

 

             Assessment    Occlusion and stenosis of right carotid artery    I65.21                    Active

 

             Assessment    Peripheral vascular disease, unspecified    I73.9                     Active

 

             Problem      Body mass index (BMI) 38.0-38.9, adult    Z68.38                    Active

 

             Assessment    Coronary angioplasty status    Z98.61                    Active

 

             Problem      Peripheral vascular disease, unspecified    I73.9                     Active

 

                          Problem                   Type 2 diabetes mellitus with diabetic peripheral angiopathy without gangrene

                    E11.51                                  Active

 

             Problem      Coronary angioplasty status    Z98.61                    Active

 

                          Problem                   Atherosclerotic heart disease of native coronary artery without angina 

pectoris            I25.10                                  Active

 

             Problem      Coronary atherosclerosis of native coronary artery    414.01                    Active

 

             Problem      Peripheral vascular disease    443.9                     Active

 

             Assessment    CHF, chronic systolic    I50.22                    Active

 

                          Assessment                Atherosclerotic heart disease of native coronary artery without angina

 pectoris           I25.10                                  Active

 

             Problem      Diabetes mellitus type II    250.00                    Active

 

             Problem      Abnormal ECG    794.31                    Active

 

             Problem      Morbid obesity    278.01                    Active

 

             Problem      Mixed hyperlipidemia    272.2                     Active



                                                                                
                                                                                
                                                                                
                                                                                
                            



Medications

          





        Medication    Code System    Code    Instructions    Start Date    End Date    Status    Dosage



 

        Aspirin    NDC     84251601164    81 MG Orally Once a day                    Active    1 tablet

 

        Metformin HCl    NDC     28240326005    500 MG Orally Twice a day                    Active    1 tablet

 with meals

 

          Metoprolol Succinate ER    NDC       11460127367    50 MG Orally Once a day                        Active

                                        1 tablet

 

        Clopidogrel Bisulfate    NDC     61778901752    75 MG Orally Once a day                    Active    

1 tablet

 

        Levemir    NDC     39301765119    100 UNIT/ML Subcutaneous                     Active    not defined

 

        Tradjenta    NDC     89189058864     Orally Once a day                    Active    1 tablet

 

        Losartan Potassium    NDC     01996156299    50 MG Orally Once a day                    Active    1 tablet



 

        Ramipril    NDC     48360948437    10 MG Orally Once a day                    Active    1 capsule

 

        Simvastatin    NDC     55786797203    10 mg Orally Once a day                    Active    1 tablet in

 the evening

 

        Fish Oil    NDC     72848114834    500 MG Orally Twice a day                    Active    1 capsule

 

        Humalog    NDC     45395286044    100 UNIT/ML Subcutaneous                     Active    not defined



                                                                                
                                                                                
                                      



Vital Signs

          





                          Date/Time:                Oct 05, 2017

 

                          BMI                       38.02 Index

 

                          Weight                    265 lbs

 

                          Height                    5ft 10in in

 

                          Cardiac Monitoring Heart Rate    78 /min

 

                          Blood Pressure Diastolic    65 mm Hg

 

                          Blood Pressure Systolic    138 mm Hg



                                                                              



Results

          No Known Results                                                      
              



Summary Purpose

          [employer] Submission

## 2018-12-13 NOTE — XMS REPORT
Koffi Barriga MD, PA

                             Created on: 10/23/2018



Godfrey Bryant

External Reference #: [id number]

: 1945

Sex: Male



Demographics







                          Address                   [address] [place]  [address]

 

                          Home Phone                [phone]

 

                          Preferred Language        Unknown

 

                          Marital Status            Unknown

 

                          [place] Affiliation     Unknown

 

                          Race                      Unknown

 

                          Ethnic Group              UNK





Author







                          Author                    Koffi Barriga

 

                          Organization              [employer]

 

                          Address                   Unknown

 

                          Phone                     Unavailable







Care Team Providers







                    Care Team Member Name    Role                Phone

 

                    Koffi Barriga                      Unavailable



                                                                



Allergies, Adverse Reactions, Alerts

          





                    Substance           Reaction            Event Type

 

                    N.K.D.A.            Info Not Available    Non Drug Allergy



                                                                                
       



Problems

          





             Problem Type    Condition    Code         Onset Dates    Condition Status

 

             Assessment    Body mass index (BMI) 37.0-37.9, adult    Z68.37                    Active

 

                          Assessment                Type 2 diabetes mellitus with diabetic peripheral angiopathy without

 gangrene           E11.51                                  Active

 

             Assessment    Coronary angioplasty status    Z98.61                    Active

 

             Assessment    Peripheral vascular disease, unspecified    I73.9                     Active

 

             Assessment    Occlusion and stenosis of right carotid artery    I65.21                    Active

 

             Problem      Peripheral vascular disease, unspecified    I73.9                     Active

 

             Assessment    CHF, chronic systolic    I50.22                    Active

 

                          Problem                   Atherosclerotic heart disease of native coronary artery without angina 

pectoris            I25.10                                  Active

 

                          Assessment                Atherosclerotic heart disease of native coronary artery without angina

 pectoris           I25.10                                  Active

 

             Problem      Abnormal electrocardiogram [ECG] [EKG]    R94.31                    Active

 

                          Problem                   Type 2 diabetes mellitus with diabetic peripheral angiopathy without gangrene

                    E11.51                                  Active

 

             Problem      Occlusion and stenosis of right carotid artery    I65.21                    Active

 

             Problem      Atrial fibrillation- Chronic    I48.2                     Active

 

             Problem      Body mass index (BMI) 37.0-37.9, adult    Z68.37                    Active

 

             Problem      Coronary atherosclerosis of native coronary artery    414.01                    Active

 

             Problem      Paroxysmal atrial fibrillation    I48.0                     Active

 

             Assessment    Paroxysmal atrial fibrillation    I48.0                     Active

 

             Problem      CHF, chronic systolic    I50.22                    Active

 

             Problem      Coronary angioplasty status    Z98.61                    Active

 

             Problem      Body mass index (BMI) 38.0-38.9, adult    Z68.38                    Active

 

             Problem      Obesity, unspecified    E66.9                     Active

 

             Problem      Mixed hyperlipidemia    272.2                     Active

 

             Problem      Diabetes mellitus type II    250.00                    Active

 

             Problem      Peripheral vascular disease    443.9                     Active

 

             Problem      Morbid obesity    278.01                    Active

 

             Problem      Carotid stenosis    433.10                    Active

 

             Problem      Chronic diastolic heart failure    428.32                    Active

 

             Problem      Abnormal ECG    794.31                    Active

 

             Problem      Postprocedural PCI status    V45.82                    Active



                                                                                
                                                                                
                                                                                
                                                                                
                                                



Medications

          





        Medication    Code System    Code    Instructions    Start Date    End Date    Status    Dosage



 

        Metformin HCl    NDC     19743370465    500 MG Orally Twice a day                    Active    1 tablet

 with meals

 

        Tradjenta    NDC     86755244301     Orally Once a day                    Active    1 tablet

 

        Simvastatin    NDC     21394916542    10 mg Orally Once a day                    Active    1 tablet in

 the evening

 

        Fish Oil    NDC     50711894520    500 MG Orally Twice a day                    Active    1 capsule

 

          Eliquis    NDC       17731940646    5 MG Orally twice a day (bid)    2018              Active

                                        1 tablet

 

        Clopidogrel Bisulfate    NDC     92984783239    75 mg Orally Once a day                    Active    

1 tablet

 

          Furosemide    NDC       26780108122    40 mg Orally Once a day    2018              Active 

                                        1 tablet

 

        Losartan Potassium    NDC     51819263153    50 MG Orally Once a day                    Active    1 tablet



 

        Humalog    NDC     97540287893    100 UNIT/ML Subcutaneous                     Active    not defined

 

        Levemir    NDC     47380576686    100 UNIT/ML Subcutaneous                     Active    not defined

 

          Metoprolol Succinate ER    NDC       96254990703    100 MG Orally Once a day                        Active

                                        1 tablet



                                                                                
                                                                                
                                      



Vital Signs

          





                          Date/Time:                Oct 23, 2018

 

                          BMI                       37.02 Index

 

                          Weight                    258 lbs

 

                          Height                    5ft 10in in

 

                          Cardiac Monitoring Heart Rate    63 /min

 

                          Blood Pressure Diastolic    80 mm Hg

 

                          Blood Pressure Systolic    141 mm Hg



                                                                              



Results

          No Known Results                                                      
              



Summary Purpose

          [employer] Submission

## 2018-12-13 NOTE — XMS REPORT
[facility]

                             Created on: 2018



Godfrey Bryant

External Reference #: [id number]

: 1945

Sex: Male



Demographics







                          Address                   [address] [place] [address] [place]  [address]

 

                          Home Phone                [phone]

 

                          Preferred Language        Unknown

 

                          Marital Status            Unknown

 

                          Congregation Affiliation     Unknown

 

                          Race                      Unknown

 

                          Ethnic Group              UNK





Author







                          Author                    Rick Dozier

 

                          Organization              [employer]

 

                          Address                   Unknown

 

                          Phone                     Unavailable







Care Team Providers







                    Care Team Member Name    Role                Phone

 

                    Rick Dozier                        Unavailable



                                                                



Allergies, Adverse Reactions, Alerts

          





                    Substance           Reaction            Event Type

 

                    N.K.D.A.            Info Not Available    Non Drug Allergy



                                                                                
       



Problems

          





             Problem Type    Condition    Code         Onset Dates    Condition Status

 

             Problem      Diabetes     E11.9                     Active

 

             Problem      Carotid arterial disease    I77.9                     Active

 

             Problem      Chronic systolic CHF (congestive heart failure)    I50.22                    Active

 

             Problem      Well controlled diabetes mellitus    E11.9                     Active

 

             Problem      PAF (paroxysmal atrial fibrillation)    I48.0                     Active

 

             Problem      HTN (hypertension)    I10                       Active

 

             Problem      BPH (benign prostatic hyperplasia)    N40.0                     Active

 

             Problem      PAD (peripheral artery disease)    I73.9                     Active

 

             Problem      Other chronic pain    G89.29                    Active

 

             Assessment    Well controlled diabetes mellitus    E11.9                     Active

 

             Assessment    Chronic systolic CHF (congestive heart failure)    I50.22                    Active

 

             Assessment    PAF (paroxysmal atrial fibrillation)    I48.0                     Active

 

             Assessment    BPH (benign prostatic hyperplasia)    N40.0                     Active



                                                                                
                                                                                
                                              



Medications

          





        Medication    Code System    Code    Instructions    Start Date    End Date    Status    Dosage



 

        Furosemide    NDC     [address]-00                            Active    not defined

 

        Simvastatin    NDC     [address]-05                            Active    not defined

 

        Metformin HCl    NDC     13898066786    500 MG Orally Twice a day                    Active    1 tablet

 with meals

 

        Tamsulosin HCl    NDC     45129580317    0.4 MG Orally Once a day                    Active    1 capsule



 

        Clopidogrel Bisulfate    NDC     60054066121    75 MG Orally Once a day                    Active    

1 tablet

 

        Losartan Potassium    NDC     [address]-52                            Active    not defined

 

          Ciprodex    NDC       25812067193    0.3-0.1 % Otic Twice a day    Oct 18, 2018              Active 

                                        4 drops into affected ear



                                                                                
                                                                             



Vital Signs

          





                          Date/Time:                Dec 05, 2018

 

                          BMI                       37.02 Index

 

                          Weight                    258 lbs

 

                          Height                    70 in

 

                          Temperature               98.6 F

 

                          Blood Pressure Diastolic    67 mm Hg

 

                          Blood Pressure Systolic    107 mm Hg



                                                                              



Results

          No Known Results                                                      
             



Summary Purpose

          [employer] Submission

## 2018-12-13 NOTE — XMS REPORT
Koffi Barriga MD, PA

                             Created on: 2016



Godfrey Bryant

External Reference #: [id number]

: 1945

Sex: Male



Demographics







                          Address                   [address] [place]  [address]

 

                          Home Phone                [phone]

 

                          Preferred Language        Unknown

 

                          Marital Status            Unknown

 

                          Christianity Affiliation     Unknown

 

                          Race                      Unknown

 

                          Ethnic Group              Non-





Author







                          Author                    Koffi Barriga

 

                          Organization              [employer]

 

                          Address                   Unknown

 

                          Phone                     Unavailable







Care Team Providers







                    Care Team Member Name    Role                Phone

 

                    Koffi Barriga    CP                  Unavailable



                                            



Encounters

          





                    Encounter           Location            Date

 

                     echo/carotid/arterial dopplers    [facility] Barriga [facility] [place]    2016

 

                    TROUBLE URINATING    [facility]    2015

 

                    Unknown             [facility]    2015

 

                    Follow-Up           Koffi Barriga MD, PA    2016



                                                                                
                                     



Problems

          





             Problem Type    Condition    ICD-9 Code    Onset Dates    Condition Status

 

             Problem      Postprocedural PCI status    V45.82                    Active

 

             Problem      Chronic diastolic heart failure    428.32                    Active

 

             Problem      Coronary atherosclerosis of native coronary artery    414.01                    Active

 

             Problem      CHF, chronic systolic    I50.22                    Active

 

             Problem      Occlusion and stenosis of right carotid artery    I65.21                    Active

 

                          Problem                   Atherosclerotic heart disease of native coronary artery without angina 

pectoris            I25.10                                  Active

 

             Problem      Abnormal electrocardiogram [ECG] [EKG]    R94.31                    Active

 

                          Problem                   Type 2 diabetes mellitus with diabetic peripheral angiopathy without gangrene

                    E11.51                                  Active

 

             Problem      Peripheral vascular disease, unspecified    I73.9                     Active

 

             Problem      Coronary angioplasty status    Z98.61                    Active

 

             Problem      Mixed hyperlipidemia    272.2                     Active

 

             Problem      Abnormal ECG    794.31                    Active

 

             Problem      Diabetes mellitus type II    250.00                    Active

 

             Problem      Carotid stenosis    433.10                    Active

 

             Problem      Morbid obesity    278.01                    Active

 

             Problem      Peripheral vascular disease    443.9                     Active



                                                                                
                                                                                
                                                                              



Social History

          





                    Social History Element    Qualifiers          Date Reported

 

                    Tobacco Use:        .  Are you a: never smoker    2016

 

                    Marital Status:     .           2016

 

                    Do you drink alcohol?    .  Status: No       2016



                                                                                
        



Summary Purpose

          [employer] Submission

## 2018-12-13 NOTE — XMS REPORT
Koffi Barriga MD, PA

                             Created on: 10/09/2018



Godfrey Bryant JR

External Reference #: [id number]

: 1945

Sex: Male



Demographics







                          Address                   [address] [place]  [address]

 

                          Home Phone                [phone]

 

                          Preferred Language        Unknown

 

                          Marital Status            Unknown

 

                          Jain Affiliation     Unknown

 

                          Race                      Unknown

 

                          Ethnic Group              Non-





Author







                          Author                    Koffi Barriga

 

                          [facility]              [employer]

 

                          Address                   Unknown

 

                          Phone                     Unavailable







Care Team Providers







                    Care Team Member Name    Role                Phone

 

                    Koffi Barriga    CP                  Unavailable



                                                                



Allergies

          No Known Allergies                                                    
                                   



Problems

          





             Problem Type    Condition    Code         Onset Dates    Condition Status

 

             Problem      CHF, chronic systolic    I50.22                    Active

 

             Problem      Occlusion and stenosis of right carotid artery    I65.21                    Active

 

             Problem      Abnormal electrocardiogram [ECG] [EKG]    R94.31                    Active

 

             Problem      Atrial fibrillation- Chronic    I48.2                     Active

 

             Problem      Obesity, unspecified    E66.9                     Active

 

             Problem      Paroxysmal atrial fibrillation    I48.0                     Active

 

             Problem      Peripheral vascular disease, unspecified    I73.9                     Active

 

                          Problem                   Type 2 diabetes mellitus with diabetic peripheral angiopathy without gangrene

                    E11.51                                  Active

 

             Problem      Body mass index (BMI) 38.0-38.9, adult    Z68.38                    Active

 

                          Problem                   Atherosclerotic heart disease of native coronary artery without angina 

pectoris            I25.10                                  Active

 

             Problem      Morbid obesity    278.01                    Active

 

             Problem      Mixed hyperlipidemia    272.2                     Active

 

             Problem      Coronary atherosclerosis of native coronary artery    414.01                    Active

 

             Problem      Peripheral vascular disease    443.9                     Active

 

             Problem      Postprocedural PCI status    V45.82                    Active

 

             Problem      Carotid stenosis    433.10                    Active

 

             Problem      Diabetes mellitus type II    250.00                    Active

 

             Problem      Chronic diastolic heart failure    428.32                    Active

 

             Problem      Abnormal ECG    794.31                    Active

 

             Problem      Coronary angioplasty status    Z98.61                    Active



                                                                                
                                                                                
                                                                                
                                      



Medications

          





        Medication    Code System    Code    Instructions    Start Date    End Date    Status    Dosage



 

        Humalog    NDC     87454864268    100 UNIT/ML Subcutaneous                     Active    not defined

 

        Losartan Potassium    NDC     11344152766    50 MG Orally Once a day                    Active    1 tablet



 

        Clopidogrel Bisulfate    NDC     43915373138    75 mg Orally Once a day                    Active    

1 tablet

 

        Levemir    NDC     78857748212    100 UNIT/ML Subcutaneous                     Active    not defined

 

          Furosemide    NDC       67185814823    40 mg Orally Once a day    2018              Active 

                                        1 tablet

 

        Simvastatin    NDC     23785116126    10 mg Orally Once a day                    Active    1 tablet in

 the evening

 

          Eliquis    NDC       82552694436    5 MG Orally twice a day (bid)    2018              Active

                                        1 tablet

 

        Tradjenta    NDC     63912425576     Orally Once a day                    Active    1 tablet

 

        Metformin HCl    NDC     10246701079    500 MG Orally Twice a day                    Active    1 tablet

 with meals

 

          Metoprolol Succinate ER    NDC       64929913924    100 MG Orally Once a day                        Active

                                        1 tablet

 

        Fish Oil    NDC     15945711177    500 MG Orally Twice a day                    Active    1 capsule



                                                                                
                                                                                
                  



Results

          No Known Results                                                      
              



Summary Purpose

          [employer] Submission

## 2018-12-13 NOTE — XMS REPORT
Vivian Cedillo MD, PA

                             Created on: 2017



Godfrey Bryant

External Reference #: [id number]

: 1945

Sex: Male



Demographics







                          Address                   [address] [place] [address] [place]  [address]

 

                          Home Phone                [phone]

 

                          Preferred Language        Unknown

 

                          Marital Status            Unknown

 

                          Evangelical Affiliation     Unknown

 

                          Race                      Unknown

 

                          Ethnic Group              Non-





Author







                          Author                    Rick Dozier

 

                          Organization              [employer]

 

                          Address                   Unknown

 

                          Phone                     Unavailable







Care Team Providers







                    Care Team Member Name    Role                Phone

 

                    Rick Dozier      CP                  Unavailable



                                                                



Allergies, Adverse Reactions, Alerts

          





                    Substance           Reaction            Event Type

 

                    N.K.D.A.            Info Not Available    Non Drug Allergy



                                                                                
       



Problems

          





             Problem Type    Condition    Code         Onset Dates    Condition Status

 

             Problem      Diabetes     E11.9                     Active

 

             Problem      BPH (benign prostatic hyperplasia)    N40.0                     Active

 

             Problem      HTN (hypertension)    I10                       Active

 

             Assessment    Diabetes     E11.9                     Active

 

             Assessment    HTN (hypertension)    I10                       Active

 

             Problem      Carotid arterial disease    I77.9                     Active

 

             Assessment    BPH (benign prostatic hyperplasia)    N40.0                     Active



                                                                                
                                                                   



Medications

          





        Medication    Code System    Code    Instructions    Start Date    End Date    Status    Dosage



 

        Clopidogrel Bisulfate    NDC     [address]-50                            Active    not defined

 

        Simvastatin    NDC     [address]-64                            Active    not defined

 

        Metoprolol Succinate ER    NDC     [address]-52                            Active    not defined

 

        Tamsulosin HCl    NDC     [address]-32    0.4 MG Orally Once a day                    Active    1 capsule



 

        Ramipril    NDC     [address]-67                            Active    not defined

 

        Metformin HCl    NDC     [address]-90                            Active    not defined



                                                                                
                                                                   



Vital Signs

          





                          Date/Time:                May 18, 2017

 

                          BMI                       37.45 Index

 

                          Weight                    261 lbs

 

                          Height                    70 in

 

                          Temperature               98.3 F

 

                          Blood Pressure Diastolic    67 mm Hg

 

                          Blood Pressure Systolic    109 mm Hg



                                                                              



Results

          No Known Results                                                      
             



Summary Purpose

          [employer]Clinical[employer] Submission

## 2018-12-13 NOTE — XMS REPORT
Koffi Barriga MD, PA

                             Created on: 2016



Godfrey Bryant

External Reference #: [id number]

: 1945

Sex: Male



Demographics







                          Address                   [address] [place]  [address]

 

                          Home Phone                [phone]

 

                          Preferred Language        Unknown

 

                          Marital Status            Unknown

 

                          Confucianist Affiliation     Unknown

 

                          Race                      Unknown

 

                          Ethnic Group              Non-





Author







                          Author                    Koffi Barriga

 

                          Organization              [employer]

 

                          Address                   Unknown

 

                          Phone                     Unavailable







Care Team Providers







                    Care Team Member Name    Role                Phone

 

                    Koffi Barriga                      Unavailable



                                                                



Allergies, Adverse Reactions, Alerts

          





                    Substance           Reaction            Event Type

 

                    N.K.D.A.            Info Not Available    Non Drug Allergy



                                                                    



Encounters

          





                    Encounter           Location            Date

 

                    TROUBLE URINATING    [facility]    2015

 

                    Unknown             [facility]    2015

 

                    Follow-Up           Koffi Barriga MD, PA    2016



                                                                                
                           



Problems

          





             Problem Type    Condition    ICD-9 Code    Onset Dates    Condition Status

 

             Problem      Postprocedural PCI status    V45.82                    Active

 

             Problem      Chronic diastolic heart failure    428.32                    Active

 

             Problem      Coronary atherosclerosis of native coronary artery    414.01                    Active

 

             Problem      CHF, chronic systolic    I50.22                    Active

 

             Assessment    Coronary angioplasty status    Z98.61                    Active

 

             Problem      Occlusion and stenosis of right carotid artery    I65.21                    Active

 

             Assessment    Abnormal electrocardiogram [ECG] [EKG]    R94.31                    Active

 

                          Assessment                Type 2 diabetes mellitus with diabetic peripheral angiopathy without

 gangrene           E11.51                                  Active

 

                          Problem                   Atherosclerotic heart disease of native coronary artery without angina 

pectoris            I25.10                                  Active

 

             Problem      Abnormal electrocardiogram [ECG] [EKG]    R94.31                    Active

 

                          Problem                   Type 2 diabetes mellitus with diabetic peripheral angiopathy without gangrene

                    E11.51                                  Active

 

             Problem      Peripheral vascular disease, unspecified    I73.9                     Active

 

             Problem      Coronary angioplasty status    Z98.61                    Active

 

             Assessment    CHF, chronic systolic    I50.22                    Active

 

                          Assessment                Atherosclerotic heart disease of native coronary artery without angina

 pectoris           I25.10                                  Active

 

             Assessment    Peripheral vascular disease, unspecified    I73.9                     Active

 

             Assessment    Occlusion and stenosis of right carotid artery    I65.21                    Active

 

             Problem      Mixed hyperlipidemia    272.2                     Active

 

             Problem      Abnormal ECG    794.31                    Active

 

             Problem      Diabetes mellitus type II    250.00                    Active

 

             Problem      Carotid stenosis    433.10                    Active

 

             Problem      Morbid obesity    278.01                    Active

 

             Problem      Peripheral vascular disease    443.9                     Active



                                                                                
                                                                                
                                                                                
                                                                    



Medications

          





        Medication    Code System    Code    Instructions    Start Date    End Date    Status    Dosage



 

        Ramipril    MEDISPAN    [address]-22    10 MG Orally Once a day                    Active    1 capsule



 

           Metoprolol Succinate ER    [facility]    [address]-27    50 MG Orally Once a day                 

                          Active                    1 tablet

 

        Levemir    [facility]    [address]-25    100 UNIT/ML Subcutaneous                     Active    Unknown



 

          Metformin HCl    [facility]    [address]-95    500 MG Orally Twice a day                        Active 

                                        1 tablet with meals

 

          Clopidogrel Bisulfate    [facility]    [address]-30    75 MG Orally Once a day                        Active

                                        1 tablet

 

        Januvia    [facility]    [address]-83    50 MG Orally Once a day                    Active    2 tablets



 

        Humalog    [facility]    [address]-58    100 UNIT/ML Subcutaneous                     Active    Unknown



 

        Fish Oil    [facility]    [address]-41    500 MG Orally Twice a day                    Active    1 capsule



 

        Simvastatin    [facility]    [address]-53    10 mg Orally Once a day                    Active    1 tablet

 in the evening

 

        Tradjenta    [facility]    [address]-41     Orally Once a day                    Active    1 tablet

 

        Aspirin    [facility]    14883-66699    81 MG Orally Once a day                    Active    1 tablet



                                                                                
                                                                                
                            



Social History

          





                    Social History Element    Qualifiers          Date Reported

 

                    Tobacco Use:        .  Are you a: never smoker    2016

 

                    Marital Status:     .           2016

 

                    Do you drink alcohol?    .  Status: No       2016



                                                                                
                                      



Vital Signs

          





                          Date/Time:                2016

 

                          Weight                    270 lbs

 

                          Cardiac Monitoring Heart Rate    78 /min

 

                          Blood Pressure Diastolic    70 mm Hg

 

                          Blood Pressure Systolic    125 mm Hg



                                                                    



Summary Purpose

          [employer]Clinical[employer] Submission

## 2018-12-13 NOTE — XMS REPORT
[address] [place] [address] 
 
 
 [address] [address] 
[phone] Patient: Duke Bates MRN: [id number] Visit Information Date & Time Provider Department Dept. Phone Encounter #  
 8/1/2018  1:00 PM [address] [phone] [id number] Follow-up Instructions Return in about 2 weeks (around 8/15/2018). Your Appointments 10/5/2018 11:00 AM  
Office Visit with MD [address] [facility]) Appt Note: 6 month f/u HTN/DM; chol  
 [address]  
  
   
 [address] [facility] Upcoming Health Maintenance Date Due Pneumococcal 19-64 Medium Risk (1 of 1 - PPSV23) 6/19/1980 Influenza Age 5 to Adult 8/1/2018 HEMOGLOBIN A1C Q6M 10/5/2018 MICROALBUMIN Q1 12/4/2018 LIPID PANEL Q1 12/4/2018 FOOT EXAM Q1 12/28/2018 EYE EXAM RETINAL OR DILATED Q1 1/15/2019 MEDICARE YEARLY EXAM 4/6/2019 BREAST CANCER SCRN MAMMOGRAM 12/27/2019 PAP AKA CERVICAL CYTOLOGY 8/30/2020 DTaP/Tdap/Td series (2 - Td) 11/1/2022 COLONOSCOPY 4/1/2024 Allergies as of 8/1/2018  Review Complete On: 8/1/2018 By: Sera Tripathi LPN Severity Noted Reaction Type Reactions Digoxin  11/24/2014   Side Effect Nausea Only Niaspan [Niacin]  10/31/2011   Side Effect Rash Wellbutrin [Bupropion Hcl]  11/24/2014   Side Effect Itching Current Immunizations  Reviewed on 9/27/2013 Name Date Influenza Vaccine 9/27/2013  3:46 PM  
 Influenza Vaccine (Quad) PF 10/17/2017, 10/13/2016, 12/17/2015  2:37 PM  
 Influenza Vaccine PF 10/20/2014 Influenza Vaccine Split 11/1/2012  9:46 AM  
 Pneumococcal Conjugate (PCV-13) 12/12/2016 TDAP Vaccine 11/1/2012  9:45 AM  
  
 Not reviewed this visit You Were Diagnosed With   
  
 Codes Comments Koffi Barriga MD, PA

                             Created on: 2018



Godfrey Bryant

External Reference #: [id number]

: 1945

Sex: Male



Demographics







                          Address                   [address] [place]  [address]

 

                          Home Phone                [phone]

 

                          Preferred Language        Unknown

 

                          Marital Status            Unknown

 

                          Cheondoism Affiliation     Unknown

 

                          Race                      Unknown

 

                          Ethnic Group              Non-





Author







                          Author                    Koffi Barriga

 

                          [facility]              eClinicalWorks

 

                          Address                   Unknown

 

                          Phone                     Unavailable







Care Team Providers







                    Care Team Member Name    Role                Phone

 

                    Koffi Barriga                      Unavailable



                                                                



Allergies, Adverse Reactions, Alerts

          





                    Substance           Reaction            Event Type

 

                    N.K.D.A.            Info Not Available    Non Drug Allergy



                                                                                
       



Problems

          





             Problem Type    Condition    Code         Onset Dates    Condition Status

 

             Assessment    Body mass index (BMI) 38.0-38.9, adult    Z68.38                    Active

 

             Problem      Postprocedural PCI status    V45.82                    Active

 

                          Assessment                Type 2 diabetes mellitus with diabetic peripheral angiopathy without

 gangrene           E11.51                                  Active

 

             Problem      Carotid stenosis    433.10                    Active

 

             Assessment    Abnormal electrocardiogram [ECG] [EKG]    R94.31                    Active

 

             Problem      Chronic diastolic heart failure    428.32                    Active

 

             Problem      Occlusion and stenosis of right carotid artery    I65.21                    Active

 

             Problem      Abnormal electrocardiogram [ECG] [EKG]    R94.31                    Active

 

             Problem      Obesity, unspecified    E66.9                     Active

 

             Problem      CHF, chronic systolic    I50.22                    Active

 

             Assessment    Occlusion and stenosis of right carotid artery    I65.21                    Active

 

             Assessment    Peripheral vascular disease, unspecified    I73.9                     Active

 

             Problem      Body mass index (BMI) 38.0-38.9, adult    Z68.38                    Active

 

             Assessment    Coronary angioplasty status    Z98.61                    Active

 

             Problem      Peripheral vascular disease, unspecified    I73.9                     Active

 

                          Problem                   Type 2 diabetes mellitus with diabetic peripheral angiopathy without gangrene

                    E11.51                                  Active

 

             Problem      Coronary angioplasty status    Z98.61                    Active

 

                          Problem                   Atherosclerotic heart disease of native coronary artery without angina 

pectoris            I25.10                                  Active

 

             Problem      Coronary atherosclerosis of native coronary artery    414.01                    Active

 

             Problem      Peripheral vascular disease    443.9                     Active

 

             Assessment    CHF, chronic systolic    I50.22                    Active

 

                          Assessment                Atherosclerotic heart disease of native coronary artery without angina

 pectoris           I25.10                                  Active

 

             Problem      Diabetes mellitus type II    250.00                    Active

 

             Problem      Abnormal ECG    794.31                    Active

 

             Problem      Morbid obesity    278.01                    Active

 

             Problem      Mixed hyperlipidemia    272.2                     Active



                                                                                
                                                                                
                                                                                
                                                                                
                  



Medications

          





        Medication    Code System    Code    Instructions    Start Date    End Date    Status    Dosage



 

        Aspirin    NDC     03672071258    81 MG Orally Once a day                    Active    1 tablet

 

          Metoprolol Succinate ER    NDC       [id number]    50 MG Orally Once a day                        Active

                                        1 tablet

 

        Tradjenta    NDC     46847346831     Orally Once a day                    Active    1 tablet

 

        Metformin HCl    NDC     56268846845    500 MG Orally Twice a day                    Active    1 tablet

 with meals

 

        Clopidogrel Bisulfate    NDC     85554696741    75 mg Orally Once a day                    Active    

1 tablet

 

        Simvastatin    NDC     21903327413    10 mg Orally Once a day                    Active    1 tablet in

 the evening

 

        Humalog    NDC     05047107180    100 UNIT/ML Subcutaneous                     Active    not defined

 

        Levemir    NDC     [id number]    100 UNIT/ML Subcutaneous                     Active    not defined

 

        Fish Oil    NDC     80933383914    500 MG Orally Twice a day                    Active    1 capsule

 

        Losartan Potassium    NDC     35685258026    50 MG Orally Once a day                    Active    1 tablet





                                                                                
                                                                                
                            



Vital Signs

          





                          Date/Time:                2018

 

                          BMI                       38.02 Index

 

                          Weight                    265 lbs

 

                          Height                    5ft 10in in

 

                          Cardiac Monitoring Heart Rate    77 /min

 

                          Blood Pressure Diastolic    88 mm Hg

 

                          Blood Pressure Systolic    140 mm Hg



                                                                              



Results

          No Known Results                                                      
              



Summary Purpose

          eClinicalWorks Submission Chronic midline low back pain without sciatica    -  Primary ICD-10-CM: M54.5, G89.29 ICD-9-CM: 724.2, 338.29 Eczema, unspecified type     ICD-10-CM: L30.9 ICD-9-CM: 692.9 Dyspepsia     ICD-10-CM: R10.13 ICD-9-CM: 536.8 Vitals BP Pulse Temp Resp Height(growth percentile) Weight(growth percentile) 116/78 87 98.3 °F (36.8 °C) (Oral) 15 5' 7\" (1.702 m) 248 lb (112.5 kg) SpO2 BMI OB Status Smoking Status 97% 38.84 kg/m2 Postmenopausal Never Smoker BMI and BSA Data Body Mass Index Body Surface Area  
 38.84 kg/m 2 2.31 m 2 Preferred Pharmacy Pharmacy Name Phone [address]  [facility] [phone] Your Updated Medication List  
  
   
This list is accurate as of 8/1/18  2:37 PM.  Always use your most recent med list.  
  
  
  
  
 AMITIZA 8 mcg capsule Generic drug:  lubiPROStone  
take 2 capsules by mouth twice a day with meals  
  
 aspirin 81 mg chewable tablet Take 81 mg by mouth daily. * BD ULTRA-FINE MINI PEN NEEDLE 31 gauge x 3/16\" Ndle Generic drug:  Insulin Needles (Disposable) BD Ultra-Fine Mini Pen Needle 31 gauge x 3/16\" BD ULTRA-FINE MINI PEN NEEDLE 31 gauge x 3/16\" Ndle Generic drug:  Insulin Needles (Disposable) * Anaid Pen Needle 32 gauge x 5/32\" Ndle Generic drug:  Insulin Needles (Disposable)  
inject once daily buPROPion  mg tablet Commonly known as:  WELLBUTRIN XL  
take 1 tablet by mouth once daily CALCIUM 600 + D(3) PO Take 1 Cap by mouth daily. carvedilol 12.5 mg tablet Commonly known as:  COREG  
take 1 tablet by mouth twice a day  
  
 cetirizine-psuedoePHEDrine 5-120 mg per tablet Commonly known as:  ZyrTEC-D Take 1 Tab by mouth two (2) times a day. cholecalciferol 1,000 unit Cap Commonly known as:  VITAMIN D3 Take by oral route. clonazePAM 0.5 mg tablet Commonly known as:  Jacy Luis  
 Take  by mouth two (2) times a day. COQ10  100-100 mg-unit Cap Generic drug:  coenzyme q10-vitamin e Take  by mouth two (2) times a day. CRESTOR 40 mg tablet Generic drug:  rosuvastatin Take 40 mg by mouth daily. NON FORMULARY DULoxetine 60 mg capsule Commonly known as:  CYMBALTA  
120 mg daily. enalapril 20 mg tablet Commonly known as:  VASOTEC  
take 1 tablet by mouth once daily  
  
 estradiol 0.01 % (0.1 mg/gram) vaginal cream  
Commonly known as:  ESTRACE Insert 2 g into vagina daily. Apply fingertip amount to outside of vaginal opening. HYDROcodone-acetaminophen 5-300 mg tablet Commonly known as:  Vertis Kays  
take 1 tablet by mouth every 4 to 6 hours if needed  
  
 hydrOXYzine HCl 50 mg tablet Commonly known as:  ATARAX  
take 1 tablet by mouth every 12 hours  
  
 ketorolac tromethamine 60 mg/2 mL Soln Commonly known as:  TORADOL  
2 mL by IntraMUSCular route now for 1 dose. LOFIBRA 134 mg capsule Generic drug:  fenofibrate micronized Take  by mouth every morning. meloxicam 15 mg tablet Commonly known as:  MOBIC Take 1 Tab by mouth daily. metFORMIN  mg tablet Commonly known as:  GLUCOPHAGE XR  
  
 MIRALAX 17 gram/dose powder Generic drug:  polyethylene glycol Take 17 g by mouth daily. multivitamin tablet Commonly known as:  ONE A DAY Take 1 Tab by mouth daily. NYAMYC powder Generic drug:  nystatin APPLY THIN LAYER UNDER BREASTS TWICE A DAY AS NEED FOR FLARE  
  
 omeprazole 20 mg capsule Commonly known as:  PRILOSEC Take 1 Cap by mouth daily. [facility] Generic drug:  lancets OneTouch Delica Lancets 30 gauge  
  
 spironolactone 25 mg tablet Commonly known as:  ALDACTONE Take 25 mg by mouth daily. SUMAtriptan succinate 6 mg/0.5 mL kit Commonly known as:  IMITREX STATDOSE PEN  
6 mg by SubCUTAneous route once as needed for Migraine. tiZANidine 4 mg tablet Commonly known as:  Pat Garcia Take 1 Tab by mouth two (2) times daily as needed. Indications: Muscle Spasm  
  
 triamcinolone acetonide 0.025 % topical cream  
Commonly known as:  KENALOG Apply  to affected area two (2) times a day. use thin layer TRULICITY 1.5 ZF/1.8 mL sub-q pen Generic drug:  dulaglutide 1.5 mg by SubCUTAneous route every seven (7) days. VICTOZA 3-KIRK 0.6 mg/0.1 mL (18 mg/3 mL) Pnij Generic drug:  Liraglutide  
inject 0.6 milligrams subcutaneously daily for 7 days then 1.2 mi...  (REFER TO PRESCRIPTION NOTES). * Notice: This list has 2 medication(s) that are the same as other medications prescribed for you. Read the directions carefully, and ask your doctor or other care provider to review them with you. Prescriptions Sent to Pharmacy Refills  
 tiZANidine (ZANAFLEX) 4 mg tablet 1 Sig: Take 1 Tab by mouth two (2) times daily as needed. Indications: Muscle Spasm Class: Normal  
 Pharmacy: [address] Ph #: [phone] Route: Oral  
 triamcinolone acetonide (KENALOG) 0.025 % topical cream 5 Sig: Apply  to affected area two (2) times a day. use thin layer Class: Normal  
 Pharmacy: [address] Ph #: [phone] Route: Topical  
 meloxicam (MOBIC) 15 mg tablet 0 Sig: Take 1 Tab by mouth daily. Class: Normal  
 Pharmacy: [address] Ph #: [phone] Route: Oral  
 omeprazole (PRILOSEC) 20 mg capsule 0 Sig: Take 1 Cap by mouth daily. Class: Normal  
 Pharmacy: [address] Ph #: [phone] Route: Oral  
  
Follow-up Instructions Return in about 2 weeks (around 8/15/2018). Patient Instructions Back Pain: Care Instructions Your Care Instructions Back pain has many possible causes. It is often related to problems with muscles and ligaments of the back. It may also be related to problems with the nerves, discs, or bones of the back. Moving, lifting, standing, sitting, or sleeping in an awkward way can strain the back. Sometimes you don't notice the injury until later. Arthritis is another common cause of back pain. Although it may hurt a lot, back pain usually improves on its own within several weeks. Most people recover in 12 weeks or less. Using good home treatment and being careful not to stress your back can help you feel better sooner. Follow-up care is a key part of your treatment and safety. Be sure to make and go to all appointments, and call your doctor if you are having problems. It's also a good idea to know your test results and keep a list of the medicines you take. How can you care for yourself at home? · Sit or lie in positions that are most comfortable and reduce your pain. Try one of these positions when you lie down: ¨ Lie on your back with your knees bent and supported by large pillows. ¨ Lie on the floor with your legs on the seat of a sofa or chair. Jocelyn Vázquez on your side with your knees and hips bent and a pillow between your legs. ¨ Lie on your stomach if it does not make pain worse. · Do not sit up in bed, and avoid soft couches and twisted positions. Bed rest can help relieve pain at first, but it delays healing. Avoid bed rest after the first day of back pain. · Change positions every 30 minutes. If you must sit for long periods of time, take breaks from sitting. Get up and walk around, or lie in a comfortable position. · Try using a heating pad on a low or medium setting for 15 to 20 minutes every 2 or 3 hours. Try a warm shower in place of one session with the heating pad. · You can also try an ice pack for 10 to 15 minutes every 2 to 3 hours. Put a thin cloth between the ice pack and your skin. · Take pain medicines exactly as directed. ¨ If the doctor gave you a prescription medicine for pain, take it as prescribed. ¨ If you are not taking a prescription pain medicine, ask your doctor if you can take an over-the-counter medicine. · Take short walks several times a day. You can start with 5 to 10 minutes, 3 or 4 times a day, and work up to longer walks. Walk on level surfaces and avoid hills and stairs until your back is better. · Return to work and other activities as soon as you can. Continued rest without activity is usually not good for your back. · To prevent future back pain, do exercises to stretch and strengthen your back and stomach. Learn how to use good posture, safe lifting techniques, and proper body mechanics. When should you call for help? Call your doctor now or seek immediate medical care if: 
  · You have new or worsening numbness in your legs.  
  · You have new or worsening weakness in your legs. (This could make it hard to stand up.)  
  · You lose control of your bladder or bowels.  
 Watch closely for changes in your health, and be sure to contact your doctor if: 
  · You have a fever, lose weight, or don't feel well.  
  · You do not get better as expected. Where can you learn more? Go to http://rm-shannon.info/. Enter C375 in the search box to learn more about \"Back Pain: Care Instructions. \" Current as of: November 29, 2017 Content Version: 11.7 © 4090-9131 [employer]. Care instructions adapted under license by [employer] (which disclaims liability or warranty for this information). If you have questions about a medical condition or this instruction, always ask your healthcare professional. [address] any warranty or liability for your use of this information. Atopic Dermatitis: Care Instructions Your Care Instructions Atopic dermatitis (also called eczema) is a skin problem that causes intense itching and a red, raised rash. In severe cases, the rash develops clear fluid-filled blisters. The rash is not contagious. People with this condition seem to have very sensitive immune systems that are likely to react to things that cause allergies. The immune system is the body's way of fighting infection. There is no cure for atopic dermatitis, but you may be able to control it with care at home. Follow-up care is a key part of your treatment and safety. Be sure to make and go to all appointments, and call your doctor if you are having problems. It's also a good idea to know your test results and keep a list of the medicines you take. How can you care for yourself at home? · Use moisturizer at least twice a day. · If your doctor prescribes a cream, use it as directed. If your doctor prescribes other medicine, take it exactly as directed. · Wash the affected area with water only. Soap can make dryness and itching worse. Pat dry. · Apply a moisturizer after bathing. Use a cream such as Lubriderm, Moisturel, or Cetaphil that does not irritate the skin or cause a rash. Apply the cream while your skin is still damp after lightly drying with a towel. · Use cold, wet cloths to reduce itching. · Keep cool, and stay out of the sun. · If itching affects your normal activities, an over-the-counter antihistamine, such as diphenhydramine (Benadryl) or loratadine (Claritin) may help. Read and follow all instructions on the label. When should you call for help? Call your doctor now or seek immediate medical care if: 
  · Your rash gets worse and you have a fever.  
  · You have new blisters or bruises, or the rash spreads and looks like a sunburn.  
  · You have signs of infection, such as: 
¨ Increased pain, swelling, warmth, or redness. ¨ Red streaks leading from the rash. ¨ Pus draining from the rash. ¨ A fever.  
  · You have crusting or oozing sores.   · You have joint aches or body aches along with your rash.  
 Watch closely for changes in your health, and be sure to contact your doctor if: 
  · Your rash does not clear up after 2 to 3 weeks of home treatment.  
  · Itching interferes with your sleep or daily activities. Where can you learn more? Go to http://rm-shannon.info/. Enter Q209 in the search box to learn more about \"Atopic Dermatitis: Care Instructions. \" Current as of: October 5, 2017 Content Version: 11.7 © 0664-5387 [employer]. Care instructions adapted under license by [employer] (which disclaims liability or warranty for this information). If you have questions about a medical condition or this instruction, always ask your healthcare professional. [address] any warranty or liability for your use of this information. Indigestion (Dyspepsia or Heartburn): Care Instructions Your Care Instructions Sometimes it can be hard to pinpoint the cause of indigestion. (It is also called dyspepsia or heartburn.) Most cases of an upset stomach with bloating, burning, burping, and nausea are minor and go away within several hours. Home treatment and over-the-counter medicine often are able to control symptoms. But if you take medicine to relieve your indigestion without making diet and lifestyle changes, your symptoms are likely to return again and again. If you get indigestion often, it may be a sign of a more serious medical problem. Be sure to follow up with your doctor, who may want to do tests to be sure of the cause of your indigestion. Follow-up care is a key part of your treatment and safety. Be sure to make and go to all appointments, and call your doctor if you are having problems. It's also a good idea to know your test results and keep a list of the medicines you take. How can you care for yourself at home? · Your doctor may recommend over-the-counter medicine. For mild or occasional indigestion, antacids such as Gaviscon, Mylanta, Maalox, or Tums, may help. Be safe with medicines. Be careful when you take over-the-counter antacid medicines. Many of these medicines have aspirin in them. Read the label to make sure that you are not taking more than the recommended dose. Too much aspirin can be harmful. · Your doctor also may recommend over-the-counter acid reducers, such as Pepcid AC, Tagamet HB, Zantac 75, or Prilosec. Read and follow all instructions on the label. If you use these medicines often, talk with your doctor. · Change your eating habits. ¨ It's best to eat several small meals instead of two or three large meals. ¨ After you eat, wait 2 to 3 hours before you lie down. ¨ Chocolate, mint, and alcohol can make GERD worse. ¨ Spicy foods, foods that have a lot of acid (like tomatoes and oranges), and coffee can make GERD symptoms worse in some people. If your symptoms are worse after you eat a certain food, you may want to stop eating that food to see if your symptoms get better. · Do not smoke or chew tobacco. Smoking can make GERD worse. If you need help quitting, talk to your doctor about stop-smoking programs and medicines. These can increase your chances of quitting for good. · If you have GERD symptoms at night, raise the head of your bed 6 to 8 inches. You can do this by putting the frame on blocks or placing a foam wedge under the head of your mattress. (Adding extra pillows does not work.) · Do not wear tight clothing around your middle. · Lose weight if you need to. Losing just 5 to 10 pounds can help. · Do not take anti-inflammatory medicines, such as aspirin, ibuprofen (Advil, Motrin), or naproxen (Aleve). These can irritate the stomach. If you need a pain medicine, try acetaminophen (Tylenol), which does not cause stomach upset. When should you call for help? Call your doctor now or seek immediate medical care if: 
  · You have new or worse belly pain.  
  · You are vomiting.  
 Watch closely for changes in your health, and be sure to contact your doctor if: 
  · You have new or worse symptoms of indigestion.  
  · You have trouble or pain swallowing.  
  · You are losing weight.  
  · You do not get better as expected. Where can you learn more? Go to http://rm-shannon.info/. Enter U069 in the search box to learn more about \"Indigestion (Dyspepsia or Heartburn): Care Instructions. \" Current as of: May 12, 2017 Content Version: 11.7 © 8080-4948 [employer]. Care instructions adapted under license by [facility] (which disclaims liability or warranty for this information). If you have questions about a medical condition or this instruction, always ask your healthcare professional. [place] [address] any warranty or liability for your use of this information. Introducing [facility]! Dear Katlin Barnett: Thank you for requesting a [employer] account. Our records indicate that you already have an active [employer] account. You can access your account anytime at [email or web address] Did you know that you can access your hospital and ER discharge instructions at any time in [employer]? You can also review all of your test results from your hospital stay or ER visit. Additional Information If you have questions, please visit the Frequently Asked Questions section of the [employer] website at [email or web address] Remember, [employer] is NOT to be used for urgent needs. For medical emergencies, dial 911. Now available from your iPhone and Android! Please provide this summary of care documentation to your next provider. Your primary care clinician is listed as [facility].  If you have any questions after today's visit, please call [phone].

## 2018-12-13 NOTE — XMS REPORT
Continuity of Care Document

                             Created on: 2018



ANDREW DONALDSON JR

External Reference #: [id number]

: 1945

Sex: Male



Demographics







                          Address                   [address] [place]  [address]

 

                          Home Phone                [phone]

 

                          Preferred Language        Unknown

 

                          Marital Status            Unknown

 

                          Restoration Affiliation     Unknown

 

                          Race                      Unknown

 

                          Ethnic Group              Unknown





Author







                          Author                    [facility]              Interface

 

                          Address                   Unknown

 

                          Phone                     Unavailable



                                                    



Problems

                    





                    Problem                            Status                            Onset Date     

                          Classification                            Date Reported       

                          Comments                            Source                    

 

                    SOB                            Active                            2015         

                                                                                        

                                        [facility]                    

 

                          DYSPNEA, ACUTE BRONCHITIS                            Active                       

                    2015                                                                        

                                                      [facility]                    

 

                    Diabetes                            Resolved                                        

                          Problem                            2015                        

                                                      [facility]                    

 

                    Hypercholesterolemia                            Active                              

                          Problem                            2015              

                                                      [facility]                    

 

                    Hypertension                            Active                                      

                          Problem                            2015                      

                                                      [facility]                    

 

                    Diabetes                            Active                                          

                    Problem                            2018                            

                                        Enayet Rahim                    

 

                    BPH                            Active                                               

                    Problem                            2018                                 

                                        Enayet Rahim                    

 

                    HTN                            Active                                               

                    Problem                            2018                                 

                                        Enayet Rahim                    

 

                          Carotid arterial disease                            Active                        

                                                Problem                            2018          

                                                      Enayet Rahim                    

 

                          Body mass index 38.0-38.9, adult                            Active                

                                                Problem                            10/24/2018  

                                                      Koffi Barriga MD, PA           

         

 

                    Obesity, unspecified                            Active                              

                          Problem                            10/24/2018              

                                                      Koffi Barriga MD, PA                    

 

                          Postprocedural PCI status                            Active                       

                                                Problem                            10/24/2018         

                                                      Koffi Barriga MD, PA                  

  

 

                                        Type 2 diabetes mellitus with diabetic peripheral angiopathy without gangrene   

                         Active                                                        

Diagnosis                            10/24/2018                                      

                                        Koffi Barriga MD, PA                    

 

                    Carotid stenosis                            Active                                  

                          Problem                            10/24/2018                  

                                                      Koffi Barriga MD, PA                    

 

                          Abnormal electrocardiogram [ECG] [EKG]                            Active          

                                                Problem                            10/24/2018

                                                        Koffi Barriga MD, PA         

           

 

                          Chronic diastolic heart failure                            Active                 

                                                Problem                            10/24/2018   

                                                      Koffi Barriga MD, PA            

        

 

                          Occlusion and stenosis of right carotid artery                            Active  

                                                      Diagnosis                      

                    10/24/2018                                                        Koffi Barriga MD, PA                    

 

                    CHF, chronic systolic                            Active                             

                           Diagnosis                            10/24/2018           

                                                      Koffi Barriga MD, PA                    



 

                          Peripheral vascular disease, unspecified                            Active        

                                                Diagnosis                            10/24/2018

                                                        Koffi Barriga MD PA         

           

 

                          Coronary angioplasty status                            Active                     

                                                Diagnosis                            10/24/2018     

                                                      Koffi Barriga MD, PA              

      

 

                                        Atherosclerotic heart disease of native coronary artery without angina pectoris 

                           Active                                                    

                    Problem                            10/24/2018                                      

                                        Koffi Barriga MD PA                    

 

                          Coronary atherosclerosis of native coronary artery                            Active

                                                        Problem                      

                    10/24/2018                                                        Koffi Barriga MD, PA                    

 

                          Peripheral vascular disease                            Active                     

                                                Problem                            10/24/2018       

                                                      Koffi Barriga MD, PA                

    

 

                          Diabetes mellitus type II                            Active                       

                                                Problem                            10/24/2018         

                                                      Koffi Barriga MD, PA                  

  

 

                    Abnormal ECG                            Active                                      

                          Problem                            10/24/2018                      

                                                      Koffi Barriga MD, PA                    

 

                    Morbid obesity                            Active                                    

                          Problem                            10/24/2018                    

                                                      Koffi Barriga MD, PA                    

 

                    Mixed hyperlipidemia                            Active                              

                          Problem                            10/24/2018              

                                                      Koffi Barriga MD, PA                    

 

                          Atrial fibrillation- Chronic                            Active                    

                                                Problem                            10/24/2018      

                                                      Koffi Barriga MD, PA               

     

 

                          Paroxysmal atrial fibrillation                            Active                  

                                                Problem                            10/24/2018    

                                                      Koffi Barriga MD, PA             

       

 

                    PAD                            Active                                               

                    Problem                            2018                                 

                                        Enayet Rahim                    

 

                    Pain in left knee                            Active                                 

                          Diagnosis                            2017               

                                                      Enayet Rahim                    

 

                    Other chronic pain                            Active                                

                          Problem                            2018                

                                                      Enayet Rahim                    

 

                          Perforated right tympanic membrane on examination                            Active

                                                        Diagnosis                    

                    10/24/2018                                                        Enayet Rahim 

                   

 

                          Fall, initial encounter                            Active                         

                                                Diagnosis                            2018         

                                                      Enayet Rahim                    

 

                          Acute pain of right shoulder                            Active                    

                                                Diagnosis                            2018    

                                                      Enayet Rahim                    

 

                          Body mass index 37.0-37.9, adult                            Active                

                                                Diagnosis                            10/24/2018

                                                        Koffi Barriga MD, PA         

           

 

                    Chronic systolic CHF                            Active                              

                          Problem                            2018              

                                                      Enayet Rahim                    

 

                          Well controlled diabetes mellitus                            Active               

                                                Problem                            2018 

                                                       Enayet Rahim                  

  

 

                    PAF                            Active                                               

                    Problem                            2018                                 

                                        Enayet Rahim                    

 

                          Other bilateral secondary osteoarthritis of knee                            Active

                                                        Diagnosis                    

                    2017                                                        Enayet Rahim 

                   

 

                    CHF                            Active                                               

                    Problem                            2018                                 

                                        [facility]                    

 

                    Hypotension                            Active                                       

                          Problem                            2018                       

                                                      [facility]             

       

 

                    RESPIRATORY ABNORM NEC                            Active                            

                                                                                       

                                        [facility]                    



                                                                                
                                                                                
                                                                                
                                                                                
                                                                                
                                                                                
                                                                                
                                                                                
                                                                                
                                        



Medications

                    





                    Medication                            Details                            Route      

                          Status                            Patient Instructions         

                          Ordering Provider                            Order Date           

                                        Source                    

 

                          Ciprodex                            4 drops into affected ear                     

                    Otic                            Active                            0.3-0.1 % Otic

 Twice a day                            [facility]                            10/18/2018 

                                        Enayet Rahim                    

 

                    Furosemide                            1 tablet                            Orally    

                          Active                            40 mg Orally Once a day    

                          [facility]                            2018                 

                                        Koffi Barriga MD, PA                    

 

                    Eliquis                            1 tablet                            Orally       

                          Active                            5 MG Orally twice a day (bid) 

                           [facility]                            2018              

                                        Koffi Barriga MD, PA                    

 

                    Aspirin                            1 tablet                            Orally       

                          Active                            81 MG Orally Once a day       

                          [facility]                            2018                    

                                        Koffi Barriga MD, PA                    

 

                          Tramadol HCl                            1 tablet as needed                        

                    Orally                            Active                            50 MG Orally every

 12 hrs as needed                            [facility]                            2018

                                        Enayet Rahikingsley                    

 

                    Tamsulosin HCl                            1 capsule                            Orally

                            Active                            0.4 MG Orally once a day

                            [facility]                            02/15/2017             

                                        Vivian Cedillo                    

 

                          Humalog                            Route: SUB-Q, Before Lunch, Dosing Weight 114.318,

 kg, Start date: 06/20/15 11:30:00, Duration: 30 day, Stop date: 07/19/15 
11:30:00                                                        No Longer Active     

                                                                               2015

                                        [facility]                    

 

                          NovoLOG FlexPen                            40 unit, 0.4 mL, Route: SUB-Q, Drug form:

 SOLN, Before Lunch, Start date: 06/20/15 11:30:00, Duration: 30 day, Stop date:
 07/19/15 11:30:00Notes: Roll in palms of hands gently;  Do not shake vigorous
ly. (Same as: NovoLOG) "single patient use only"  Stable for 28 days at room 
temperature. Expires in _____ days from ______________Date                      
                                                No Longer Active                                     

                                                2015                            [facility]                    

 

                          Amlodipine                            2.5 mg, 1 tab, Route: PO, Drug form: TAB, Daily,

 Dosing Weight 114.318, kg, Start date: 06/20/15 9:00:00, Duration: 30 day, Stop
 date: 07/19/15 9:00:00Notes: (Same as: Norvasc)                                   

                          No Longer Active                                                

                                                2015                            [facility] 

                   

 

                          Aspirin                            81 mg, 1 tab, Route: PO, Drug form: CHEWTAB, Daily,

 Dosing Weight 114.318, kg, Start date: 06/20/15 9:00:00, Duration: 30 day, Stop
 date: 07/19/15 9:00:00Notes: Take with food.                                      

                    Inactive                                                             

                          2015                            [facility]            

        

 

                          Humalog                            Route: SUB-Q, Before Breakfast, Dosing Weight 114.318,

 kg, Start date: 06/20/15 7:30:00, Duration: 30 day, Stop date: 07/19/15 7:30:00
                                                        No Longer Active             

                                                                            2015      

                                        [facility]                    

 

                          NovoLOG FlexPen                            36 unit, 0.36 mL, Route: SUB-Q, Drug form:

 SOLN, Before Breakfast, Start date: 06/20/15 7:30:00, Duration: 30 day, Stop 
date: 07/19/15 7:30:00Notes: Roll in palms of hands gently;  Do not shake vigor
ously. (Same as: NovoLOG) "single patient use only"  Stable for 28 days at room 
temperature. Expires in _____ days from ______________Date                      
                                                No Longer Active                                     

                                                2015                            [facility]                    

 

                          Levemir FlexPen                            110 unit, 1.1 mL, Route: SUB-Q, Drug form:

 INJ, Bedtime, Dosing Weight 114.318, kg, Start date: 06/19/15 21:00:00, 
Duration: 30 day, Stop date: 07/18/15 21:00:00Notes: Same as Levemir Do not hold
 insulin without contacting prescriber  "single patient use only"               
                                                No Longer Active                              

                                                      2015                     

                                        [facility]                    

 

                          Insulin, Aspart, Human                            12 unit, 0.12 mL, Route: SUB-Q, 

Drug form: SOLN, TID-Before Meals, Dosing Weight 114.318, kg, PRN Blood Glucose 
Results, Start date: 06/19/15 17:24:00, Duration: 30 day, Stop date: 07/19/15 
17:23:00Notes: Roll in palms of hands gently;  Do not shake vigorously. (Same 
as: NovoLOG) "single patient use only"  Stable for 28 days at room temperature. 
Expires in _____ days from ______________Date                                      

                          No Longer Active                                                   

                                                2015                            [facility]    

                

 

                          Dextrose 50% Syringe                            12.5 gm, 25 mL, Route: IVP, Drug Form:

 INJ, Dosing Weight 114.318, kg, PRN, PRN Blood Glucose Results, Start date: 
06/19/15 17:24:00, Duration: 30 day, Stop date: 07/19/15 17:23:00               
                                                No Longer Active                              

                                                      2015                     

                                        [facility]                    

 

                          Glucagon                            1 mg, Route: IM, Drug form: PDR/INJ, PRN, Dosing

 Weight 114.318, kg, PRN Blood Glucose Results, Start date: 06/19/15 17:24:00, 
Duration: 30 day, Stop date: 07/19/15 17:23:00                                     

                          No Longer Active                                                  

                                                2015                            [facility]   

                 

 

                          NovoLOG FlexPen                            45 unit, 0.45 mL, Route: SUB-Q, Drug form:

 SOLN, Before Dinner, Start date: 06/19/15 16:30:00, Duration: 30 day, Stop 
date: 07/18/15 16:30:00Notes: Roll in palms of hands gently;  Do not shake 
vigorously. (Same as: NovoLOG) "single patient use only"  Stable for 28 days at 
room temperature. Expires in _____ days from ______________Date                 
                                                No Longer Active                                

                                                      2015                       

                                        [facility]                    

 

                          Humalog                            Route: SUB-Q, Before Dinner, Dosing Weight 114.318,

 kg, Start date: 06/19/15 16:30:00, Duration: 30 day, Stop date: 07/18/15 
16:30:00                                                        Inactive             

                                                                            2015      

                                        [facility]                    

 

                          Humalog                            20 unit, Route: SUB-Q, PRN, Dosing Weight 114.318,

 kg, PRN With Snack, Start date: 06/19/15 14:52:00, Duration: 30 day, Stop date:
 07/19/15 14:51:00                                                        Inactive   

                                                                                 2015

                                        [facility]                    

 

                          Humalog 100 units/mL                            40, SUB-Q, Before Lunch, 0 Refill(s)

                                                        On Hold                      

                                                                            2015               

                                        [facility]                    

 

                          Levemir                            110, SUB-Q, Bedtime, 0 Refill(s)               

                                                On Hold                                       

                                                2015                            [facility]

                    

 

                          Ramipril                            10 mg, PO, BID, 0 Refill(s)                   

                                                On Hold                                           

                                                2015                            [facility]

                    

 

                          Plavix                            75 mg, 1 tab, Route: PO, Drug form: TAB, Daily, 

Dosing Weight 122.727, kg, Start date: 06/19/15 9:00:00, Duration: 30 day, Stop 
date: 07/18/15 9:00:00Notes: (Same As: Plavix)                                     

                          No Longer Active                                                  

                                                2015                            [facility]   

                 

 

                          Aspirin                            81 mg, 1 tab, Route: PO, Drug form: ECTAB, Daily,

 Dosing Weight 122.727, kg, Start date: 06/19/15 9:00:00, Duration: 30 day, Stop
 date: 07/18/15 9:00:00Notes: Do not crush or chew. (Same As: Ecotrin)          
                                                      No Longer Active                       

                                                                            2015                

                                        [facility]                    

 

                                        pneumococcal capsular polysaccharide type 1 vaccine / pneumococcal capsular polysaccharide

 type 10A vaccine / pneumococcal capsular polysaccharide type 11A vaccine / 
pneumococcal capsular polysaccharide type 12F vaccine / pneumococcal capsular 
polysacchar                             0.5 mL, Route: IM, Drug Form: INJ, Daily, Start

 date: 06/19/15 9:00:00, Duration: 1 doses or times, Stop date: 06/19/15 
9:00:00Notes: (Same as: Pneumovax 23)  Refrigerate                                 

                       Inactive                                                        

                            2015                            [facility]       

             

 

                          Prednisone                            60 mg, 3 tab, Route: PO, Drug form: TAB, Breakfast,

 Dosing Weight 122.727, kg, Start date: 06/19/15 8:00:00, Duration: 30 day, Stop
 date: 07/18/15 8:00:00Notes: Take with food.                                      

                    Inactive                                                             

                          2015                            [facility]            

        

 

                          nitroglycerin 0.4 mg sublingual tablet                            0.4 mg, 1 tab, Route:

 SL, Drug form: TAB, Q5Min, PRN Chest Pain, Start date: 06/19/15 6:05:00, 
Duration: 30 day, Stop date: 07/19/15 6:04:00Notes: (Same as:Nitroquick, 
Nitrostat) "Do Not Crush"  Sublingual tablet                                       

                          No Longer Active                                                    

                                                2015                            [facility]     

               

 

                          atropine                            0.5 mg, 5 mL, Route: IVP, Drug form: INJ, PRN,

 PRN Bradycardia, Start date: 06/19/15 6:05:00, Duration: 30 day, Stop date: 
07/19/15 6:04:00                                                        No Longer Active

                                                                                    2015

                                        [facility]                    

 

                          Azithromycin                            500 mg, 250 mL, Route: IVPB, Drug form: PDR/INJ,

 TOTC20N, Dosing Weight 122.727, kg, Start date: 06/19/15 6:00:00, Duration: 30 
day, Stop date: 07/18/15 6:00:00Notes: Same as: Zithromax                       
                                                No Longer Active                                      

                                                2015                            [facility]                    

 

                          Crestor                            PO, Bedtime, 0 Refill(s)                       

                                                On Hold                                               

                                                2015                            [facility]

                    

 

                          Amlodipine                            PO, Daily, 0 Refill(s)                      

                                                On Hold                                              

                                                2015                            [facility]

                    

 

                          Metformin                            250 mg, PO, BID, 0 Refill(s)                 

                                                On Hold                                         

                                                2015                            [facility]

                    

 

                          Aspirin                            81 mg, PO, Daily, 0 Refill(s)                  

                                                On Hold                                          

                                                2015                            [facility]

                    

 

                          Insulin, Aspart, Human                            1 unit, 0.01 mL, Route: SUB-Q, Drug

 form: SOLN, TID-Before Meals, Dosing Weight 122.727, kg, PRN Blood Glucose 
Results, Start date: 06/19/15 5:25:00, Duration: 30 day, Stop date: 07/19/15 
5:24:00Notes: Roll in palms of hands gently;  Do not shake vigorously. (Same as:
 NovoLOG) "single patient use only"  Stable for 28 days at room temperature. 
Expires in _____ days from ______________Date                                      

                    Inactive                                                             

                          2015                            [facility]            

        

 

                          Glucagon                            1 mg, Route: IM, Drug form: PDR/INJ, PRN, Dosing

 Weight 122.727, kg, PRN Blood Glucose Results, Start date: 06/19/15 5:25:00, 
Duration: 30 day, Stop date: 07/19/15 5:24:00                                      

                    Inactive                                                             

                          2015                            [facility]            

        

 

                          Dextrose 50% Syringe                            12.5 gm, 25 mL, Route: IVP, Drug Form:

 INJ, Dosing Weight 122.727, kg, PRN, PRN Blood Glucose Results, Start date: 
06/19/15 5:25:00, Duration: 30 day, Stop date: 07/19/15 5:24:00                 
                                                Inactive                                        

                                                2015                            [facility]

                    

 

                          normal saline 0.9% IV 1,000 mL                            1,000 mL, Rate: 100 ml/hr,

 Infuse over: 10 hr, Route: IV, Dosing Weight 122.727 kg, Total Volume: 1,000, 
Start date: 06/19/15 5:11:00, Duration: 30 day, Stop date: 07/19/15 5:10:00     
                                                      No Longer Active                  

                                                                            2015           

                                        [facility]                    

 

                          Ipratropium                            0.5 mg, 2.5 mL, Route: NEB, Drug form: SOLN,

 PRN, Dosing Weight 122.727, kg, PRN Respiratory Protocol, Start date: 06/19/15 
5:07:00, Duration: 30 day, Stop date: 07/19/15 5:06:00Notes: SEE RT DOCUMEN
TATION (Same as:Atrovent)                                                        No Longer

 Active                                                                              

                          2015                            [facility]                    

 

                          Xopenex                            1.25 mg, 3 mL, Route: NEB, Drug form: SOLN, PRN,

 Dosing Weight 122.727, kg, PRN Respiratory Protocol, Start date: 06/19/15 
5:07:00, Duration: 30 day, Stop date: 07/19/15 5:06:00Notes: SEE RT DOCUMENT
ATION (Same as:Xopenex) Non-Formulary                                              

                    No Longer Active                                                             

                          2015                            [facility]            

        

 

                          Sodium Chloride 0.154 MEQ/ML Injectable Solution                            1,000 

mL, 1,000 ml/hr, Infuse Over: 1 hr, Route: IV, 1,000, Drug form: INJ, ONCE, 
Priority: STAT, Dosing Weight 122.727 kg, Start date: 06/19/15 4:57:00, 
Duration: 1 doses or times, Stop date: 06/19/15 4:57:00                            

                            No Longer Active                                         

                                                2015                            [facility]

                    

 

                          Zithromax                            500 mg, 250 mL, Route: IVPB, Drug form: PDR/INJ,

 ONCE, Dosing Weight 122.727, kg, Priority: STAT, Start date: 06/19/15 4:10:00, 
Stop date: 06/19/15 4:10:00Notes: Same as: Zithromax                               

                          Inactive                                                    

                                                2015                            [facility]     

               

 

                          methylPREDNISolone SODium SUCCinate                            125 mg, 2 mL, Route:

 IVP, Drug form: INJ, ONCE, Dosing Weight 122.727, kg, Priority: STAT, Start 
date: 06/19/15 1:25:00, Stop date: 06/19/15 1:25:00Notes: (Same as:Solu-MEDROL, 
A-Methapred)                                                        Inactive         

                                                                            2015  

                                        [facility]                    

 

                                        Albuterol 0.833 MG/ML / Ipratropium Bromide 0.167 MG/ML Inhalant Solution [DuoNeb]

                                        3 ml, Route: NEB, Drug Form: SOLN, Dosing Weight 122.727,

 kg, PRN, PRN Respiratory Protocol, Start date: 06/19/15 1:25:00, Duration: 30 
day, Stop date: 07/19/15 1:24:00Notes: (Same as: Duoneb)                        
                                                Inactive                                               

                                                2015                            [facility]

                    

 

                                        Albuterol 0.833 MG/ML / Ipratropium Bromide 0.167 MG/ML Inhalant Solution [DuoNeb]

                                        3 ml, Route: NEB, Drug Form: SOLN, Dosing Weight 122.727,

 kg, PRN, PRN Respiratory Protocol, Start date: 06/19/15 1:24:00, Duration: 30 
day, Stop date: 07/19/15 1:23:00Notes: (Same as: Duoneb)                        
                                                Inactive                                               

                                                2015                            [facility]

                    

 

                          Saline Flush 0.9%                            10 mL, Route: IVP, Drug Form: INJ, Dosing

 Weight 122.727, kg, PRN, PRN Line Flush, Start date: 06/18/15 22:46:00, 
Duration: 30 day, Stop date: 07/18/15 22:45:00Notes: preservative free.         
                                                      No Longer Active                      

                                                                            2015               

                                        [facility]                    

 

                          Clopidogrel Bisulfate                            not defined                      

                    NA                            Active                                             

                    [facility]  

                  

 

                    Simvastatin                            not defined                            NA    

                        Active                                                        [facility]                 

   

 

                          Metoprolol Succinate ER                            not defined                    

                    NA                            Active                                           

                    [facility]

                    

 

                    Tamsulosin HCl                            1 capsule                            Orally

                            Active                            0.4 MG Orally Once a day

                            [facility]                    

 

                    Ramipril                            not defined                            NA       

                     Active                                                        [facility]                 

   

 

                    Metformin HCl                            not defined                            NA  

                          Active                                                     

                    [facility]          

          

 

                    Aspirin                            1 tablet                            Orally       

                          Active                            81 MG Orally Once a day       

                     Nithya Barriga [facility], [facility]                    

 

                          Metformin HCl                            1 tablet with meals                      

                    Orally                            Active                            500 MG Orally

 Twice a day                            Nithya Barriga MD, [facility]                    

 

                          Metoprolol Succinate ER                            1 tablet                       

                    Orally                            Active                            50 MG Orally Once

 a day                            Nithya Barriga MD, [facility]                    

 

                    Levemir                            not defined                            Subcutaneous

                            Active                            100 UNIT/ML Subcutaneous

                            Nithya Barriga MD, [facility]                    

 

                    Tradjenta                            1 tablet                            Orally     

                          Active                            Orally Once a day           

                    Nithya Barriga MD, [facility]                    

 

                    Losartan Potassium                            1 tablet                            Orally

                            Active                            50 MG Orally Once a day

                            Nithya Barriga MD, [facility]                    

 

                    Ramipril                            1 capsule                            Orally     

                          Active                            10 MG Orally Once a day     

                       Nithya Barriga MD, [facility]                    

 

                          Simvastatin                            1 tablet in the evening                    

                    Orally                            Active                            10 mg Orally

 Once a day                            Nithya Barriga MD, PA                    

 

                    Fish Oil                            1 capsule                            Orally     

                          Active                            500 MG Orally Twice a day   

                         Nithya Barriga MD, PA                    

 

                    Humalog                            not defined                            Subcutaneous

                            Active                            100 UNIT/ML Subcutaneous

                            Nithya Barriga MD, [facility]                    

 

                          Metoprolol Succinate ER                            1 tablet                       

                    Orally                            Active                            100 MG Orally 

Once a day                            Nithya Barriga MD, PA                    

 

                    Tamsulosin HCl                            1 capsule                            Orally

                            Active                            0.4 MG Orally Once a day

                            Tasia Monkjun Yoamy                    

 

                          Metformin HCl                            1 tablet with meals                      

                    Orally                            Active                            500 MG Orally

 Twice a day                            Tasiaadonis Cedillo                    

 

                          Losartan Potassium                            not defined                         

                    NA                            Active                                                

                    [facility]     

               

 

                          Clopidogrel Bisulfate                            1 tablet                         

                    Orally                            Active                            75 MG Orally Once

 a day                            [facility]asia Barriga MD, PA,Enparisaet Rahi                    

 

                    Simvastatin                            not defined                            NA    

                        Active                                                        [facility]                 

   

 

                          Metoprolol Succinate ER                            not defined                    

                    NA                            Active                                           

                    [facility] [place] [facility]

                    

 

                    Furosemide                            not defined                            NA     

                       Active                                                        [facility]

                                                        Lachojun Josep                 

   

 

                    Ramipril                            1 capsule                            Orally     

                          Active                            10 MG Orally Once a day     

                       Nithya Barriga MD, PA                    

 

                          Metoprolol Succinate ER                            1 tablet                       

                    Orally                            Active                            50 MG Orally Once

 a day                            Nithya Barriga MD, PA                    

 

                    Levemir                            Unknown                            Subcutaneous  

                          Active                            100 UNIT/ML Subcutaneous 

                           Nithya Barriga MD, PA                    

 

                          Metformin HCl                            1 tablet with meals                      

                    Orally                            Active                            500 MG Orally

 Twice a day                            Nithya Barriga MD, PA                    

 

                          Clopidogrel Bisulfate                            1 tablet                         

                    Orally                            Active                            75 MG Orally Once

 a day                            Nithya Barriga MD, PA                    

 

                    Januvia                            2 tablets                            Orally      

                          Active                            50 MG Orally Once a day      

                      Nithya Barriga MD, PA                    

 

                    Humalog                            Unknown                            Subcutaneous  

                          Active                            100 UNIT/ML Subcutaneous 

                           Nithya Barriga MD, PA                    

 

                    Fish Oil                            1 capsule                            Orally     

                          Active                            500 MG Orally Twice a day   

                         Nithya Barriga MD, PA                    

 

                          Simvastatin                            1 tablet in the evening                    

                    Orally                            Active                            10 mg Orally

 Once a day                            Nithya Barriga MD, PA                    

 

                    Tradjenta                            1 tablet                            Orally     

                          Active                            Orally Once a day           

                    Nithya Barriga MD, PA                    

 

                    Aspirin                            1 tablet                            Orally       

                          Active                            81 MG Orally Once a day       

                     Nithya Barriga MD, PA                    

 

                          Levofloxacin (Levaquin) 500 Mg Tablet                            Daily            

                                                [facility]                    

 

                          Metformin Hcl 500 Mg Tablet                            Twice A Day                

                                                [facility]                    

 

                          Simvastatin 20 Mg Tablet                            Today At 9:00PM               

                                                [facility]                    



                                                                                
                                                                                
                                                                                
                                                                                
                                                                                
                                                                                
                                                                                
                                                                                
                                                                                
                                                                                
                                                                                
                                                                                
                                                                                
                                                                                
                                                                                
                                                                                
                                                                                
                                        



Allergies, Adverse Reactions, Alerts

                    





                    Substance                            Category                            Reaction   

                          Severity                            Reaction type           

                          Status                            Date Reported                     

                          Comments                            Source                    

 

                    N.K.D.A.                            Adverse Reaction                            Info

 Not Available                                                        Adverse Reaction

                            Active                            2018             

                                                      Enayet Rahim                    



                                                                        



Immunizations

                    





                    Immunization                            Date Given                            Site  

                          Status                            Last Updated             

                          Comments                            Source                    

 

                          pneumococcal 23-valent vaccine                            2015              

                                                Not Given                            Kashmir Orozco                 

   



                                                                                
        



Results

                    





                    Order Name                            Results                            Value      

                          Reference Range                            Date                

                          Interpretation                            Comments                       

                                        Source                    

 

                                        Capillary blood glucose measurement by glucometer (mass/volume)                 

                                        Capillary blood glucose measurement by glucometer (mass/volume)         

                          160                              70 - 120                         

                    2018                                                                          

                                        [facility]                    

 

                                        Fibrin D-dimer DDU measurement in platelet poor plasma (mass/volume)            

                                        Fibrin D-dimer DDU measurement in platelet poor plasma (mass/volume)

                            0.48                              0.00 - 0.45            

                    2018                                                             

                                        [facility]                    

 

                                                B-Type Natriuretic Peptide                            115.9

                              0 - 100                            2018          

                                                                            [facility]                    

 

                          Automated blood basophil count (count/volume)                            Automated

 blood basophil count (count/volume)                            0.0                

                          0.0 - 0.1                            2018                        

                                                                            [facility]                    

 

                                        Automated blood basophil count as percentage of total leukocytes                

                                        Automated blood basophil count as percentage of total leukocytes       

                          0.3                              0.0 - 1.0                      

                    2018                                                                       

                                        [facility]                    

 

                          Automated blood eosinophil count                            Automated blood eosinophil

 count                            0.2                              0.0 - 0.4         

                    2018                                                          

                                        [facility]                  

  

 

                                        Automated blood eosinophil count as percentage of total leukocytes              

                                        Automated blood eosinophil count as percentage of total leukocytes   

                          2.0                              0.0 - 6.0                  

                    2018                                                                   

                                        [facility]                    

 

                          Automated blood hematocrit (volume fraction)                            Automated 

blood hematocrit (volume fraction)                            45.3                 

                          38.2 - 49.6                            2018                       

                                                                            [facility]                    

 

                                        Automated blood lymphocyte count as percentage ot total leukocytes              

                                        Automated blood lymphocyte count as percentage ot total leukocytes   

                          16.2                              18.0 - 39.1               

                    2018                                                                

                                        [facility]                    

 

                                        Automated blood monocyte count as percentage of total leukocytes                

                                        Automated blood monocyte count as percentage of total leukocytes       

                          9.4                              4.4 - 11.3                     

                    2018                                                                      

                                        [facility]                    

 

                          Automated blood neutrophil count                            Automated blood neutrophil

 count                            6.7                              2.1 - 6.9         

                    2018                                                          

                                        [facility]                  

  

 

                          Automated blood platelet count (count/volume)                            Automated

 blood platelet count (count/volume)                            237                

                          140 - 360                            2018                        

                                                                            [facility]                    

 

                                        Automated blood segmented neutrophil count as percentage of total leukocytes    

                                        Automated blood segmented neutrophil count as percentage of

 total leukocytes                            71.6                              38.7 -

 80.0                            2018                                          

                                                      [facility]    

                

 

                                        Automated erythrocyte mean corpuscular hemoglobin (mass per erythrocyte)        

                                        Automated erythrocyte mean corpuscular hemoglobin (mass per erythrocyte)

                            31.7                              28 - 32                

                    2018                                                                 

                                        [facility]                    

 

                                        Automated erythrocyte mean corpuscular hemoglobin concentration measurement (mass/volume)

                                        Automated erythrocyte mean corpuscular hemoglobin concentration

 measurement (mass/volume)                            32.5                         

                    31 - 35                            2018                                     

                                                      [facility]

                    

 

                          Automated erythrocyte mean corpuscular volume                            Automated

 erythrocyte mean corpuscular volume                            97.6               

                          81 - 99                            2018                         

                                                                            [facility]                    

 

                          Blood erythrocytes automated count (number/volume)                            Blood

 erythrocytes automated count (number/volume)                            4.64      

                          4.3 - 5.7                            2018              

                                                                            [facility]                    

 

                          Blood hemoglobin measurement (moles/volume)                            Blood hemoglobin

 measurement (moles/volume)                            14.7                        

                    14.0 - 18.0                            2018                                

                                                      [facility]                    

 

                          Blood leukocytes automated count (number/volume)                            Blood 

leukocytes automated count (number/volume)                            9.34         

                          4.8 - 10.8                            2018                

                                                                            [facility]                    

 

                          Blood lymphocytes count (number/volume)                            Blood lymphocytes

 count (number/volume)                            1.5                              1.0

 - 3.2                            2018                                         

                                                      [facility]   

                 

 

                          Blood monocytes automated count (number/volume)                            Blood monocytes

 automated count (number/volume)                            0.9                    

                    0.2 - 0.8                            2018                              

                                                      [facility]                    

 

                                        Estimated glomerular filtration rate (GFR) determination                        

                                        Estimated glomerular filtration rate (GFR) determination                       

                    null                            60                            2018          

                                                                            [facility]                    

 

                          Glucose measurement                            Glucose measurement                

                    159                              74 - 118                            2018

                                                                                    [facility]                    

 

                          Serum or plasma anion gap                            Serum or plasma anion gap    

                          12.5                              8 - 16                     

                    2018                                                                      

                                        [facility]                    

 

                          Serum or plasma calcium measurement (mass/volume)                            Serum

 or plasma calcium measurement (mass/volume)                            9.2        

                          8.4 - 10.2                            2018               

                                                                            [facility]                    

 

                                        Serum or plasma carbon dioxide, total measurement (moles/volume)                

                                        Serum or plasma carbon dioxide, total measurement (moles/volume)       

                          27                              22 - 29                         

                    2018                                                                          

                                        [facility]                    

 

                          Serum or plasma chloride measurement (moles/volume)                            Serum

 or plasma chloride measurement (moles/volume)                            105      

                          98 - 107                            2018               

                                                                            [facility]                    

 

                          Serum or plasma creatinine measurement (mass/volume)                            Serum

 or plasma creatinine measurement (mass/volume)                            0.92    

                          0.72 - 1.25                            2018          

                                                                            [facility]                    

 

                          Serum or plasma potassium measurement (moles/volume)                            Serum

 or plasma potassium measurement (moles/volume)                            4.5     

                          3.5 - 5.1                            2018             

                                                                            [facility]                    

 

                          Serum or plasma sodium measurement (moles/volume)                            Serum

 or plasma sodium measurement (moles/volume)                            140        

                          136 - 145                            2018                

                                                                            [facility]                    

 

                                        Serum or plasma urea nitrogen measurement (mass/volume)                         

                                        Serum or plasma urea nitrogen measurement (mass/volume)                         

                    27                              7 - 26                            2018        

                                                                            [facility]                    

 

                          Serum or plasma urea nitrogen/creatinine mass ratio                            Serum

 or plasma urea nitrogen/creatinine mass ratio                            29       

                          6 - 25                            2018                  

                                                                            [facility]                    

 

                                                Red Cell Distribution Width                            13.8

                              11.7 - 14.4                            2018      

                                                                              [facility]                    

 

                                                IM GRANULOCYTES %                            0.5        

                          0.0 - 1.0                            2018                

                                                                            [facility]                    

 

                                                      Absolute Immature Granulocyte (auto                   

                    0.05                              0 - 0.1                            2018

                                                                                    [facility]                    

 

                                        Serum or plasma creatine kinase MB measurement (mass/volume)                    

                                        Serum or plasma creatine kinase MB measurement (mass/volume)               

                    4.30                              0 - 5.0                            2018

                                                                                    [facility]                    

 

                                        Serum or plasma creatine kinase measurement (enzymatic activity/volume)         

                                        Serum or plasma creatine kinase measurement (enzymatic activity/volume)

                            236                              30 - 200                

                    2018                                                                 

                                        [facility]                    

 

                                        Serum or plasma thyrotropin measurement by detection limit <=0.005 miu/l (units/volume)

                                        Serum or plasma thyrotropin measurement by detection limit

 <=0.005 miu/l (units/volume)                            0.803                     

                          0.350 - 4.940                            2018                         

                                                                            [facility]                    

 

                                        Serum or plasma thyroxine (T4) free measurement (mass/volume)                   

                                        Serum or plasma thyroxine (T4) free measurement (mass/volume)             

                    0.99                              0.9 - 1.8                            2018

                                                                                    [facility]                    

 

                                        Troponin I measurement by highly sensitive enzyme immunoassay                   

                                        Troponin I measurement by highly sensitive enzyme immunoassay             

                    0.047                              0 - 0.300                            

2018                                                                           

                                        [facility]                    

 

                                                Hemoglobin A1c Percent                            6.9   

                           4.0 - 7.0                            2018           

                                                                            [facility]                    

 

                                        Automated urine sediment leukocyte count by microscopy (number/high power field)

                                        Automated urine sediment leukocyte count by microscopy 

(number/high power field)                            null                            

0 - 5                            2018                                          

                                                      [facility]    

                

 

                                        Bacteria detection in urine sediment by light microscopy                        

                                        Bacteria detection in urine sediment by light microscopy                       

                    MODERATE                              NONE                            2018  

                                                                                  [facility]                    

 

                          Bacterial urine culture                            Bacterial urine culture        

                          Organism: ESCHERICHIA COLI                                       

                    2018                                                          

                                        [facility]                  

  

 

                                        Epithelial cells detection in urine sediment by light microscopy                

                                        Epithelial cells detection in urine sediment by light microscopy       

                     RARE                              NONE                            

2018                                                                           

                                        [facility]                    

 

                                        Erythrocytes detection in urine sediment by light microscopy                    

                                        Erythrocytes detection in urine sediment by light microscopy               

                    null                            0 - 5                            2018

                                                                                    [facility]                    

 

                          Specific gravity of Urine by Test strip                            Specific gravity

 of Urine by Test strip                            1.020                           

                    1.010 - 1.025                            2018                                 

                                                      [facility]

                    

 

                    Urine clarity                            Urine clarity                            CLOUDY

                              CLEAR                            2018            

                                                                            [facility]                    

 

                          Urine color determination                            Urine color determination    

                          YELLOW                              YELLOW                   

                    2018                                                                    

                                        [facility]                    

 

                          Urine erythrocytes detection                            Urine erythrocytes detection

                            4+                              NEGATIVE                 

                    2018                                                                  

                                        [facility]                    

 

                          Urine glucose detection                            Urine glucose detection        

                          NEGATIVE                              NEGATIVE                   

                    2018                                                                    

                                        [facility]                    

 

                          Urine ketones detection by automated test strip                            Urine ketones

 detection by automated test strip                            NEGATIVE             

                          NEGATIVE                            2018                      

                                                                            [facility]                    

 

                          Urine leukocyte esterase detection by dipstick                            Urine leukocyte

 esterase detection by dipstick                            2+                      

                    NEGATIVE                            2018                                 

                                                      [facility]

                    

 

                          Urine nitrite detection                            Urine nitrite detection        

                          POSITIVE                              NEGATIVE                   

                    2018                                                                    

                                        [facility]                    

 

                          Urine pH measurement by automated test strip                            Urine pH measurement

 by automated test strip                            5                              5 

- 7                            2018                                            

                                                      [facility]      

              

 

                          Urine protein measurement by test strip (mass/volume)                            Urine

 protein measurement by test strip (mass/volume)                            1+     

                          NEGATIVE                            2018              

                                                                            [facility]                    

 

                          Urine total bilirubin measurement (mass/volume)                            Urine total

 bilirubin measurement (mass/volume)                            NEGATIVE           

                          NEGATIVE                            2018                    

                                                                            [facility]                    

 

                                        Urine urobilinogen measurement by test strip (mass/volume)                      

                                        Urine urobilinogen measurement by test strip (mass/volume)                   

                    0.2                              0.2 - 1                            2018

                                                                                    [facility]                    

 

                          Stool gastrointestinal hemoglobin detection                            Stool gastrointestinal

 hemoglobin detection                            NEGATIVE                          

                    NEGATIVE                            2018                                     

                                                      [facility]

                    

 

                                        Influenza virus A and B antigen identification by immunofluorescence            

                                        Influenza virus A and B antigen identification by immunofluorescence

                            NEGATIVE                              NEGATIVE           

                    2018                                                            

                                        [facility]                    



 

                                        Activated partial thromboplastin time (aPTT) in platelet poor plasma bycoagulation

 assay                                  Activated partial thromboplastin time (aPTT) in platelet

 poor plasma bycoagulation assay                            24.9                   

                          23.8 - 35.5                            2018                         

                                                                            [facility]                    

 

                          INR in Platelet poor plasma by Coagulation assay                            INR in

 Platelet poor plasma by Coagulation assay                            1.09         

                                                 2018                            

                                                        [facility]                    

 

                          Plasma globulin measurement (mass/volume)                            Plasma globulin

 measurement (mass/volume)                            3.6                          

                    2.3 - 3.5                            2018                                    

                                                      [facility]

                    

 

                                        Prothrombin time (PT) in platelet poor plasma by coagulation assay              

                                        Prothrombin time (PT) in platelet poor plasma by coagulation assay   

                          13.3                              11.9 - 14.5               

                    2018                                                                

                                        [facility]                    

 

                                        Serum or plasma alanine aminotransferase measurement (enzymatic activity/volume)

                                        Serum or plasma alanine aminotransferase measurement (enzymatic

 activity/volume)                            27                              0 - 55  

                          2018                                                 

                                                      [facility]           

         

 

                          Serum or plasma albumin measurement (mass/volume)                            Serum

 or plasma albumin measurement (mass/volume)                            3.8        

                          3.5 - 5.0                            2018                

                                                                            [facility]                    

 

                          Serum or plasma albumin/globulin mass ratio                            Serum or plasma

 albumin/globulin mass ratio                            1.1                        

                    0.8 - 2.0                            2018                                  

                                                      [facility]

                    

 

                                        Serum or plasma alkaline phosphatase measurement (enzymatic activity/volume)    

                                        Serum or plasma alkaline phosphatase measurement (enzymatic

 activity/volume)                            46                              40 - 150

                            2018                                               

                                                      [facility]         

           

 

                          Serum or plasma magnesium measurement (mass/volume)                            Serum

 or plasma magnesium measurement (mass/volume)                            1.9      

                          1.3 - 2.1                            2018              

                                                                            [facility]                    

 

                          Serum or plasma protein measurement (mass/volume)                            Serum

 or plasma protein measurement (mass/volume)                            7.4        

                          6.5 - 8.1                            2018                

                                                                            [facility]                    

 

                                        Serum or plasma total bilirubin measurement (mass/volume)                       

                                        Serum or plasma total bilirubin measurement (mass/volume)                     

                    0.9                              0.2 - 1.2                            2018

                                                                                    [facility]                    

 

                                                      Aspartate Amino Transf (AST/SGOT)                     

                    24                              5 - 34                            2018    

                                                                                [facility]                    

 

                    Liver US                            Liver US                            Liver US 

HX: Abdominal pain, chronic

COMPARISON: NONE

 

FINDINGS: Views the gallbladder show no evidence of cholelithiasis or dilatation
 of bile ducts. The gallbladder is mildly distended. The liver homogeneous. The 
common hepatic duct measures 4 mm in diameter which is within normal limits. The
 pancreas is not well-seen due to midline bowel gas. The patient was also unable
 to cooperate with breath hold instructions.

 

IMPRESSION:  

                                        1. No focal liver lesion identified.

                                        2. Mildly distended gallbladder is otherwise unremarkable.

                                        3. Pancreas not well-seen. 

 

SL: 12

                                                          2015                 

                                                      -

                                        -





Read by:  Moses Osman MD

Dictated Date/time:  06/21/15 14:12

Electronically Signed by:  Moses Osman MD                    06/21/15 
14:13

FINAL REPORT

                                        [facility]                    

 

                    CARDIAC ENZYMES                            Total CK                            909 unit/L

                             12 - 191                            2015          

                                                                            [facility] 

                   

 

                    ELECTROLYTES                            AGAP                            8.4 meq/L   

                          10.0 - 20.0                            2015          

                                                                            [facility] 

                   

 

                    ELECTROLYTES                            CO2                            30 meq/L     

                          24 - 32                            2015                

                                                                            [facility]       

             

 

                    ELECTROLYTES                            AST                            78 unit/L    

                          0 - 37                            2015                

                                                                            [facility]       

             

 

                    ELECTROLYTES                            Bili Total                            1.1 mg/dL

                             0.2 - 1.3                            2015         

                                                                            [facility]

                    

 

                    ELECTROLYTES                            Alk Phos                            66 unit/L

                             39 - 136                            2015          

                                                                            [facility] 

                   

 

                    ELECTROLYTES                            B/C Ratio                            35     

                          6 - 25                            2015                

                                                                            [facility]       

             

 

                    ELECTROLYTES                            Albumin Lvl                            2.7 g/dL

                             3.5 - 5.0                            2015         

                                                                            [facility]

                    

 

                    ELECTROLYTES                            Total Protein                            5.9

 g/dL                             6.4 - 8.4                            2015    

                                                                                [facility]

                    

 

                    ELECTROLYTES                            Globulin                            3.2 g/dL

                             2.0 - 4.0                            2015         

                                                                            [facility]

                    

 

                    ELECTROLYTES                            ALT                            74 unit/L    

                          0 - 65                            2015                

                                                                            [facility]       

             

 

                    ELECTROLYTES                            A/G Ratio                            0.8    

                          0.7 - 1.6                            2015            

                                                                            [facility]   

                 

 

                    ELECTROLYTES                            Glucose Lvl                            126 mg/dL

                             70 - 99                            2015           

                                                      4Interpretive Data: Adult reference range

 values reflect the clinical guidelines

of the American Diabetes Association.                            [facility]      

              

 

                    ELECTROLYTES                            BUN                            39 mg/dL     

                          7 - 22                            2015                 

                                                                            [facility]        

            

 

                    ELECTROLYTES                            eGFR                            68 mL/min/1.73m2

                                                         2015                  

                                                      1Result Comment: The eGFR is calculated using the

 CKD-EPI formula. In most young, healthy individuals the eGFR will be >90 
mL/min/1.73m2. The eGFR declines with age. An eGFR of 60-89 may be normal in 
some populations, particularly the elderly, for whom the CKD-EPI formula has not
 been extensively validated. Use of the eGFR is not recommended in the following
 populations:



Individuals with unstable creatinine concentrations, including pregnant patients
 and those with serious co-morbid conditions.



Patients with extremes in muscle mass or diet. 



The data above are obtained from the National Kid[employer] Disease Education Program 
(NKDEP) which additionally recommends that when the eGFR is used in patients 
with extremes of body mass index for purposes of drug dosing, the eGFR should be
 multiplied by the estimated BMI.                            [facility]          

          

 

                    ELECTROLYTES                            Sodium Lvl                            143 meq/L

                             135 - 145                            2015         

                                                                            [facility]

                    

 

                    ELECTROLYTES                            Creatinine Lvl                            1.1

 mg/dL                             0.5 - 1.4                            2015   

                                                                                 [facility]

                    

 

                    ELECTROLYTES                            Calcium Lvl                            8.6 mg/dL

                             8.5 - 10.5                            2015        

                                                                            [facility]

                    

 

                    ELECTROLYTES                            Chloride Lvl                            109 

meq/L                             95 - 109                            2015     

                                                                               [facility]

                    

 

                    ELECTROLYTES                            Potassium Lvl                            4.4

 meq/L                             3.5 - 5.1                            2015   

                                                                                 [facility]

                    

 

                    HEMATOLOGY                            Monocytes #                            1.3 K/CMM

                             0.0 - 0.8                            2015         

                                                                            [facility]

                    

 

                    HEMATOLOGY                            Basophils                            0.1 %    

                          0.0 - 1.0                            2015             

                                                                            [facility]    

                

 

                    HEMATOLOGY                            Segs-Bands #                            12.7 K/CMM

                             1.5 - 8.1                            2015         

                                                                            [facility]

                    

 

                    HEMATOLOGY                            Lymphocytes #                            1.1 K/CMM

                             1.0 - 5.5                            2015         

                                                                            [facility]

                    

 

                    HEMATOLOGY                            Eosinophils                            0.1 %  

                           0.0 - 4.0                            2015           

                                                                            [facility]  

                  

 

                    HEMATOLOGY                            Monocytes                            8.7 %    

                          2.0 - 12.0                            2015            

                                                                            [facility]   

                 

 

                    HEMATOLOGY                            Lymphocytes                            7.0 %  

                           20.0 - 40.0                            2015         

                                                                            [facility]

                    

 

                    HEMATOLOGY                            Segs                            84.1 %        

                          45.0 - 75.0                            2015               

                                                                            [facility]      

              

 

                    HEMATOLOGY                            Hct                            43.2 %         

                          42.0 - 54.0                            2015                

                                                                            [facility]       

             

 

                    HEMATOLOGY                            MCV                            94.5 fL        

                          80.0 - 94.0                            2015               

                                                                            [facility]      

              

 

                    HEMATOLOGY                            MCH                            31.5 pg        

                          27.0 - 31.0                            2015               

                                                                            [facility]                            MCHC                            33.3 g/dL     

                          32.0 - 36.0                            2015            

                                                                            [facility]   

                 

 

                    HEMATOLOGY                            Hgb                            14.4 g/dL      

                          14.0 - 18.0                            2015             

                                                                            [facility]    

                

 

                    HEMATOLOGY                            WBC                            15.2 K/CMM     

                          3.7 - 10.4                            2015             

                                                                            [facility]    

                

 

                    HEMATOLOGY                            RBC                            4.57 M/CMM     

                          4.70 - 6.10                            2015            

                                                                            [facility]   

                 

 

                    HEMATOLOGY                            Platelet                            211 K/CMM 

                            133 - 450                            2015          

                                                                            [facility] 

                   

 

                    HEMATOLOGY                            MPV                            8.3 fL         

                          7.4 - 10.4                            2015                 

                                                                            [facility]        

            

 

                    HEMATOLOGY                            RDW                            15.8 %         

                          11.5 - 14.5                            2015                

                                                                            [facility]       

             

 

                    URINE AND STOOL                            UA Turbidity                            Marked

 

*ABN*

                    (6/20/15 6:42 AM)                              Clear                            2015

                                                                                    [facility]                    

 

                    URINE AND STOOL                            UA Spec Grav                            1.020

                              <=1.030                            2015          

                                                                            [facility] 

                   

 

                    URINE AND STOOL                            UA WBC                            26 /HPF

                             0 - 5                            2015             

                                                                            [facility]    

                

 

                    URINE AND STOOL                            UA Bacteria                            Many

 /HPF                              None Seen /HPF                            2015

                                                                                    [facility]                    

 

                    URINE AND STOOL                            UA Mucus                            Few /LPF

                              None Seen /LPF                            2015   

                                                                                  [facility]

                    

 

                    URINE AND STOOL                            UA Hyal Cast                            3

 /LPF                             0 - 2                            2015        

                                                                            [facility]

                    

 

                    URINE AND STOOL                            UA Nitrite                            Negative

 

                    (6/20/15 6:42 AM)                              Negative                            2015

                                                                                    [facility]                    

 

                    URINE AND STOOL                            UA Leuk Est                            Trace

 

*ABN*

                    (6/20/15 6:42 AM)                              Negative                            2015

                                                                                    [facility]                    

 

                    URINE AND STOOL                            UA Sq Epi                            Occasional

 /LPF                              Few /LPF                            2015    

                                                                                [facility]

                    

 

                    URINE AND STOOL                            UA RBC                            1 /HPF 

                            0 - 2                            2015              

                                                                            [facility]     

               

 

                    URINE AND STOOL                            UA Protein                            30 

mg/dL                              Negative mg/dL                            2015

                                                                                    [facility]                    

 

                    URINE AND STOOL                            UA Color                            Yellow

 

*NA*

                    (6/20/15 6:42 AM)                              Yellow                            2015

                                                                                    [facility]                    

 

                    URINE AND STOOL                            UA Ketones                            Negative

 mg/dL                              Negative mg/dL                            2015

                                                                                    [facility]                    

 

                    URINE AND STOOL                            UA Glucose                            500

 mg/dL                              Negative mg/dL                            2015

                                                                                    [facility]                    

 

                    URINE AND STOOL                            UA Bili                            Negative

 

*NA*

                    (6/20/15 6:42 AM)                              Negative                            2015

                                                                                    [facility]                    

 

                    URINE AND STOOL                            UA Blood                            Large

 

*ABN*

                    (6/20/15 6:42 AM)                              Negative                            2015

                                                                                    [facility]                    

 

                    URINE AND STOOL                            UA pH                            5.0     

                          5.0 - 8.0                            2015             

                                                                            [facility]    

                

 

                          URINE AND STOOL                            UA Urobilinogen                        

                    <=1.0 mg/dL                              0.1 - 1.0                            2015

                                                                                    [facility]                    

 

                    CARDIAC ENZYMES                            Total CK                            1494 

unit/L                             12 - 191                            2015    

                                                                                [facility]

                    

 

                    CHEM PANEL                            eGFR                            51 mL/min/1.73m2

                                                         2015                  

                                                      2Result Comment: The eGFR is calculated using the

 CKD-EPI formula. In most young, healthy individuals the eGFR will be >90 
mL/min/1.73m2. The eGFR declines with age. An eGFR of 60-89 may be normal in 
some populations, particularly the elderly, for whom the CKD-EPI formula has not
 been extensively validated. Use of the eGFR is not recommended in the following
 populations:



Individuals with unstable creatinine concentrations, including pregnant patients
 and those with serious co-morbid conditions.



Patients with extremes in muscle mass or diet. 



The data above are obtained from the National Kidney Disease Education Program 
(NKDEP) which additionally recommends that when the eGFR is used in patients 
with extremes of body mass index for purposes of drug dosing, the eGFR should be
 multiplied by the estimated BMI.                            [facility]          

          

 

                    CHEM PANEL                            BUN                            46 mg/dL       

                          7 - 22                            2015                   

                                                                            [facility]          

          

 

                    CHEM PANEL                            CO2                            25 meq/L       

                          24 - 32                            2015                  

                                                                            [facility]         

           

 

                    CHEM PANEL                            Creatinine Lvl                            1.4 

mg/dL                             0.5 - 1.4                            2015    

                                                                                [facility]

                    

 

                    CHEM PANEL                            AGAP                            13.8 meq/L    

                          10.0 - 20.0                            2015           

                                                                            [facility]  

                  

 

                    CHEM PANEL                            Calcium Lvl                            9.0 mg/dL

                             8.5 - 10.5                            2015        

                                                                            [facility]

                    

 

                    CHEM PANEL                            Glucose Lvl                            318 mg/dL

                             70 - 99                            2015           

                                                      5Interpretive Data: Adult reference range

 values reflect the clinical guidelines

of the American Diabetes Association.                            [facility]      

              

 

                    CHEM PANEL                            Chloride Lvl                            104 meq/L

                             95 - 109                            2015          

                                                                            [facility] 

                   

 

                    CHEM PANEL                            Potassium Lvl                            4.8 meq/L

                             3.5 - 5.1                            2015         

                                                                            [facility]

                    

 

                    CHEM PANEL                            Sodium Lvl                            138 meq/L

                             135 - 145                            2015         

                                                                            [facility]

                    

 

                    HEMATOLOGY                            Monocytes                            5.8 %    

                          2.0 - 12.0                            2015            

                                                                            [facility]   

                 

 

                    HEMATOLOGY                            Basophils                            0.1 %    

                          0.0 - 1.0                            2015             

                                                                            [facility]                            Lymphocytes                            3.4 %  

                           20.0 - 40.0                            2015         

                                                                            [facility]

                    

 

                    HEMATOLOGY                            Segs                            90.7 %        

                          45.0 - 75.0                            2015               

                                                                            [facility]                            Lymphocytes #                            0.8 K/CMM

                             1.0 - 5.5                            2015         

                                                                            [facility]

                    

 

                    HEMATOLOGY                            Monocytes #                            1.4 K/CMM

                             0.0 - 0.8                            2015         

                                                                            [facility]                            Segs-Bands #                            22.3 K/CMM

                             1.5 - 8.1                            2015         

                                                                            [facility]                            WBC                            24.6 K/CMM     

                          3.7 - 10.4                            2015             

                                                                            [facility]                            Hgb                            14.6 g/dL      

                          14.0 - 18.0                            2015             

                                                                            [facility]                            RBC                            4.55 M/CMM     

                          4.70 - 6.10                            2015            

                                                                            [facility]   

                 

 

                    HEMATOLOGY                            MPV                            8.6 fL         

                          7.4 - 10.4                            2015                 

                                                                            [facility]        

            

 

                    HEMATOLOGY                            Platelet                            201 K/CMM 

                            133 - 450                            2015          

                                                                            [facility] 

                   

 

                    HEMATOLOGY                            RDW                            15.9 %         

                          11.5 - 14.5                            2015                

                                                                            [facility]       

             

 

                    HEMATOLOGY                            MCV                            94.7 fL        

                          80.0 - 94.0                            2015               

                                                                            [facility]      

              

 

                    HEMATOLOGY                            Hct                            43.1 %         

                          42.0 - 54.0                            2015                

                                                                            [facility]       

             

 

                    HEMATOLOGY                            MCHC                            33.9 g/dL     

                          32.0 - 36.0                            2015            

                                                                            [facility]   

                 

 

                    HEMATOLOGY                            MCH                            32.1 pg        

                          27.0 - 31.0                            2015               

                                                                            [facility]      

              

 

                    LIPIDS                            LDL (Calculated)                            25 mg/dL

                             <=99 mg/dL                            2015        

                                                                            [facility]

                    

 

                    LIPIDS                            VLDL                            51                

                                                2015                                   

                                                      [facility]                    

 

                    LIPIDS                            CHD Risk                            9.44          

                          4.00 - 7.30                            2015                

                                                                            [facility]       

             

 

                    LIPIDS                            HDL                            9 mg/dL            

                          >=61 mg/dL                            2015                    

                                                                            [facility]           

         

 

                    LIPIDS                            Trig                            257 mg/dL         

                          <=149 mg/dL                            2015                

                                                                            [facility]       

             

 

                    LIPIDS                            Chol                            85 mg/dL          

                          <=199 mg/dL                            2015                 

                                                                            [facility]        

            

 

                    CARDIAC ENZYMES                            Total CK                            1805 

unit/L                             12 - 191                            2015    

                                                                                [facility]

                    

 

                    CHEM PANEL                            Procalcitonin Lvl                            4.06

 ng/mL                             0.00 - 0.10                            2015 

                                                       7Result Comment: Critical Result(s)

 called to Melinda Marlow at 2015 10:00 bykyh.  Read back OK.        
                                        [facility]                    

 

                    CARDIAC ENZYMES                            Troponin-I                            0.04

 ng/mL                             0.00 - 0.40                            2015 

                                                                                   [facility]

                    

 

                    CARDIAC ENZYMES                            CK MB Index                            0.4

                              0.0 - 2.5                            2015        

                                                                            [facility]

                    

 

                    CARDIAC ENZYMES                            CK MB                            6.8 ng/mL

                             0.5 - 3.6                            2015         

                                                                            [facility]

                    

 

                    CARDIAC ENZYMES                            Troponin-I                            0.05

 ng/mL                             0.00 - 0.40                            2015 

                                                                                   [facility]

                    

 

                    CARDIAC ENZYMES                            CK MB Index                            0.2

                              0.0 - 2.5                            2015        

                                                                            [facility]

                    

 

                    CARDIAC ENZYMES                            CK MB                            4.1 ng/mL

                             0.5 - 3.6                            2015         

                                                                            [facility]

                    

 

                    ELECTROLYTES                            AGAP                            11.6 meq/L  

                           10.0 - 20.0                            2015         

                                                                            [facility]

                    

 

                    ELECTROLYTES                            eGFR                            51 mL/min/1.73m2

                                                         2015                  

                                                      3Result Comment: The eGFR is calculated using the

 CKD-EPI formula. In most young, healthy individuals the eGFR will be >90 
mL/min/1.73m2. The eGFR declines with age. An eGFR of 60-89 may be normal in 
some populations, particularly the elderly, for whom the CKD-EPI formula has not
 been extensively validated. Use of the eGFR is not recommended in the following
 populations:



Individuals with unstable creatinine concentrations, including pregnant patients
 and those with serious co-morbid conditions.



Patients with extremes in muscle mass or diet. 



The data above are obtained from the National Kidney Disease Education Program 
(NKDEP) which additionally recommends that when the eGFR is used in patients 
with extremes of body mass index for purposes of drug dosing, the eGFR should be
 multiplied by the estimated BMI.                            [facility]          

          

 

                    ELECTROLYTES                            CO2                            27 meq/L     

                          24 - 32                            2015                

                                                                            [facility]       

             

 

                    ELECTROLYTES                            Chloride Lvl                            103 

meq/L                             95 - 109                            2015     

                                                                               [facility]

                    

 

                    ELECTROLYTES                            BUN                            24 mg/dL     

                          7 - 22                            2015                 

                                                                            [facility]        

            

 

                    ELECTROLYTES                            Potassium Lvl                            4.6

 meq/L                             3.5 - 5.1                            2015   

                                                                                 [facility]

                    

 

                    ELECTROLYTES                            Sodium Lvl                            137 meq/L

                             135 - 145                            2015         

                                                                            [facility]

                    

 

                    ELECTROLYTES                            Creatinine Lvl                            1.4

 mg/dL                             0.5 - 1.4                            2015   

                                                                                 [facility]

                    

 

                    ELECTROLYTES                            Glucose Lvl                            261 mg/dL

                             70 - 99                            2015           

                                                      6Interpretive Data: Adult reference range

 values reflect the clinical guidelines

of the [employer].                            [facility]      

              

 

                    ELECTROLYTES                            Calcium Lvl                            9.5 mg/dL

                             8.5 - 10.5                            2015        

                                                                            [facility]

                    

 

                    HEMATOLOGY                            Segs-Bands #                            23.1 K/CMM

                             1.5 - 8.1                            2015         

                                                                            [facility]

                    

 

                    HEMATOLOGY                            Monocytes                            3.0 %    

                          2.0 - 12.0                            2015            

                                                                            [facility]   

                 

 

                    HEMATOLOGY                            Monocytes #                            0.7 K/CMM

                             0.0 - 0.8                            2015         

                                                                            [facility]

                    

 

                    HEMATOLOGY                            Lymphocytes #                            0.5 K/CMM

                             1.0 - 5.5                            2015         

                                                                            [facility]

                    

 

                    HEMATOLOGY                            Segs                            95.1 %        

                          45.0 - 75.0                            2015               

                                                                            [facility]      

              

 

                    HEMATOLOGY                            Plt Morph                            Normal 

                    (6/19/15 5:11 AM)                                                          2015

                                                                                    [facility]                            Lymphocytes                            1.9 %  

                           20.0 - 40.0                            2015         

                                                                            [facility]

                    

 

                    HEMATOLOGY                            RBC Morph                            Normal 

                    (6/19/15 5:11 AM)                                                          2015

                                                                                    [facility]                    

 

                    HEMATOLOGY                            MPV                            8.0 fL         

                          7.4 - 10.4                            2015                 

                                                                            [facility]        

            

 

                    HEMATOLOGY                            Platelet                            196 K/CMM 

                            133 - 450                            2015          

                                                                            [facility] 

                   

 

                    HEMATOLOGY                            RDW                            15.9 %         

                          11.5 - 14.5                            2015                

                                                                            [facility]       

             

 

                    HEMATOLOGY                            MCHC                            33.8 g/dL     

                          32.0 - 36.0                            2015            

                                                                            [facility]   

                 

 

                    HEMATOLOGY                            MCH                            31.8 pg        

                          27.0 - 31.0                            2015               

                                                                            [facility]      

              

 

                    HEMATOLOGY                            MCV                            94.0 fL        

                          80.0 - 94.0                            2015               

                                                                            [facility]      

              

 

                    HEMATOLOGY                            WBC                            24.3 K/CMM     

                          3.7 - 10.4                            2015             

                                                                            [facility]    

                

 

                    HEMATOLOGY                            Hgb                            15.5 g/dL      

                          14.0 - 18.0                            2015             

                                                                            [facility]                            Hct                            45.9 %         

                          42.0 - 54.0                            2015                

                                                                            [facility]                            RBC                            4.89 M/CMM     

                          4.70 - 6.10                            2015            

                                                                            [facility]   

                 

 

                    SPECIAL CHEMISTRY                            Hgb A1C                            6.6 

%                             <=5.6 %                            2015          

                                                                            [facility] 

                   

 

                    IMMUNOLOGY                            [place]-Hep C Ab                            Negative

 

*NA*

                    (6/19/15 5:09 AM)                              Negative                            2015

                                                                                    [facility]                    

 

                    CARDIAC ENZYMES                            CK MB Index                            0.3

                              0.0 - 2.5                            2015        

                                                                            [facility]

                    

 

                    CARDIAC ENZYMES                            BNP                            565 pg/mL 

                            <=100 pg/mL                            2015        

                                                      8Interpretive Data: Elevated results 

are in line with increasing severity of

congestive heart failure. Minor elevations between 100 and 300

may be seen with Myocardial Ischemia, Sodium retaining drugs,

and compensated/treated heart failure.                            [facility]     

               

 

                    CARDIAC ENZYMES                            Troponin-I                            0.05

 ng/mL                             0.00 - 0.40                            2015 

                                                                                   [facility]

                    

 

                    CARDIAC ENZYMES                            CK MB                            2.8 ng/mL

                             0.5 - 3.6                            2015         

                                                                            [facility]

                    

 

                    CHEM PANEL                            A/G Ratio                            0.9      

                          0.7 - 1.6                            2015              

                                                                            [facility]     

               

 

                    CHEM PANEL                            Globulin                            3.7 g/dL  

                           2.0 - 4.0                            2015           

                                                                            [facility]  

                  

 

                    CHEM PANEL                            Bili Total                            1.7 mg/dL

                             0.2 - 1.3                            2015         

                                                                            [facility]

                    

 

                    CHEM PANEL                            AST                            32 unit/L      

                          0 - 37                            2015                  

                                                                            [facility]         

           

 

                    CHEM PANEL                            Alk Phos                            56 unit/L 

                            39 - 136                            2015           

                                                                            [facility]  

                  

 

                    CHEM PANEL                            B/C Ratio                            15       

                          6 - 25                            2015                  

                                                                            [facility]         

           

 

                    CHEM PANEL                            Total Protein                            7.1 g/dL

                             6.4 - 8.4                            2015         

                                                                            [facility]

                    

 

                    CHEM PANEL                            Albumin Lvl                            3.4 g/dL

                             3.5 - 5.0                            2015         

                                                                            [facility]

                    

 

                    CHEM PANEL                            ALT                            28 unit/L      

                          0 - 65                            2015                  

                                                                            [facility]                            PTT                            32.0 s         

                          22.9 - 35.8                            2015                

                                                      10Interpretive Data: Heparin Therapeutic Range:

  57 - 92 Seconds                            [facility]                            INR                            1.34           

                          0.85 - 1.17                            2015                 

                                                      9Interpretive Data: RECOMMENDED RANGES FOR PROTIME

 INR:

   2.0-3.0 for most medical and surgical thromboembolic states.

   2.5-3.5 for artificial heart valves and recurrent embolism.



INR SHOULD BE USED ONLY FOR PATIENTS ON STABLE ANTICOAGULANT THERAPY.           
                                        [facility]                    

 

                    HEMATOLOGY                            PT                            16.7 s          

                          12.0 - 14.7                            2015                 

                                                                            [facility]                            Basophils                            0.1 %    

                          0.0 - 1.0                            2015             

                                                                            [facility]    

                

 

                          Chest  Pulmonary Embolism CTA                            Chest  Pulmonary Embolism

 CTA                                     

EXAM: CT pulmonary arteries and routine CT chest

HISTORY: Chest pain, possible pulmonary embolism.

COMPARISON: Chest regress 2015.

TECHNIQUE: Thin collimation axial images obtained through the pulmonary arteries
 with sagittal and coronal reformats and 3D volume rendered images; routine CT 
chest then performed.  IV contrast given.

 

FINDINGS:

 

                                        1. Suboptimal contrast opacification of the peripheral pulmonary arteries. No central

 pulmonary artery embolism is seen.

                                        2. Respiratory motion artifact limits evaluation of the lungs. No definite pneumonia

 or edema. No pleural effusion.

                                        3. Coronary artery calcifications. Mild cardiomegaly.

                                        4. No bulky adenopathy.

                                        5. Spondylosis thoracic spine.

 

 

 

 

 

 

 

 

SL: 12

                                                          2015                 

                                                      -

                                        -





Read by:  Alex Ordoñez MD

Dictated Date/time:  06/19/15 01:00

Electronically Signed by:  Alex Ordoñez MD                            06/19/15 
01:04

FINAL REPORT

                                        [facility]                    

 

                    Chest 1view DX                            Chest 1view DX                            

EXAMINATION: Chest 1view 

 

CLINICAL HISTORY:   Shortness of Breath  

 

COMPARISON: No prior similar examinations are currently available for 
comparison.

 

The hypoinflated lungs are clear of consolidation, pleural effusion, and 
pneumothorax. Mild bilateral basilar subsegmental atelectasis. Mildly elevated 
right hemidiaphragm. The heart size is at the upper limits of normal to mildly 
enlarged.

 

 

 

 

SL:17

                                                          2015                 

                                                      -

                                        -





Read by:  Mayo Barroso MD

Dictated Date/time:  06/18/15 22:27

Electronically Signed by:  Mayo Barroso MD                   06/18/15 
22:28

FINAL REPORT

                                        [facility]                    

 

                          Bacterial urine culture                            Bacterial urine culture        

                          Urine Culture                                                    

                                                                                                     

                                        [facility]                    



                                                                                
                                                                                
                                                                                
                                                                                
                                                                                
                                                                                
                                                                                
                                                                                
                                                                                
                                                                                
                                                                                
                                                                                
                                                                                
                                                                                
                                                                                
                                                                                
                                                                                
                                                                                
                                                                                
                                                                                
                                                                                
                                                                                
                                                                                
                                                                                
                                                                                
                                                                                
                                                                                
                                                                                
                                                                                
                                                                                
                                                                                
                                                                                
                                                                                
                                                                                
                                                                                
                                                                                
                                                                                
                                                                                
                                                                                
                                                                                
                                                                                
                                                                                
                                                                                
                                                                                
                                                                                
                



Vital Signs

                    





                    Vital Sign                            Value                            Date         

                          Comments                            Source                    

 

                    Weight                            258                             2018        

                                                      Enay[facility] Rahim                    

 

                    Height                            70                             2018         

                                                      Enayet Rahim                    

 

                    Temperature Oral (F)                            98.6 F                            2018

                                                        Enayet Rahim                 

   

 

                    Diastolic (mm Hg)                            67                             2018

                                                        Enayet Rahim                 

   

 

                    Systolic (mm Hg)                            107                             2018

                                                        Enay[facility] Rahim                 

   

 

                    Weight                            258                             10/23/2018        

                                                      Koffi Barriga MD, PA                 

   

 

                    Heart Rate                            63                             10/23/2018     

                                                      Koffi Barriga MD, [facility]              

      

 

                    Diastolic (mm Hg)                            80                             10/23/2018

                                                        [facility]         

           

 

                    Systolic (mm Hg)                            141                             10/23/2018

                                                        Koffi Barriga MD, [facility]         

           

 

                    Weight                            258                             10/18/2018        

                                                      Enayet Rahim                    

 

                    Height                            70                             10/18/2018         

                                                      Enayet Rahim                    

 

                    Temperature Oral (F)                            98.7 F                            10/18/2018

                                                        Enayet Rahim                 

   

 

                    Diastolic (mm Hg)                            66                             10/18/2018

                                                        Enayet Rahim                 

   

 

                    Systolic (mm Hg)                            116                             10/18/2018

                                                        Enayet Rahim                 

   

 

                    Weight                            260                             2018        

                                                      Koffi Barriga MD, PA                 

   

 

                    Heart Rate                            84                             2018     

                                                      Koffi Barriga MD, [facility]              

      

 

                    Diastolic (mm Hg)                            70                             2018

                                                        Koffi Barriga MD, [facility]         

           

 

                    Systolic (mm Hg)                            110                             2018

                                                        Koffi Barriga MD, [facility]         

           

 

                    Weight                            270                             2018        

                                                      Koffi Barriga MD, PA                 

   

 

                    Heart Rate                            92                             2018     

                                                      Koffi Barriga MD, [facility]              

      

 

                    Diastolic (mm Hg)                            80                             2018

                                                        Koffi Barriga MD, [facility]         

           

 

                    Systolic (mm Hg)                            140                             2018

                                                        Koffi Barriga MD, [facility]         

           

 

                    Weight                            263                             2018        

                                                      Enayet Rahim                    

 

                    Height                            70                             2018         

                                                      Enayet Rahim                    

 

                    Temperature Oral (F)                            98.4 F                            2018

                                                        Enayet Rahim                 

   

 

                    Diastolic (mm Hg)                            89                             2018

                                                        Enayet Rahim                 

   

 

                    Systolic (mm Hg)                            150                             2018

                                                        Enayet Rahim                 

   

 

                    Weight                            265                             2018        

                                                      [facility] Barriga [facility]                 

   

 

                    Heart Rate                            77                             2018     

                                                      [facility]i Barriga [facility]              

      

 

                    Diastolic (mm Hg)                            88                             2018

                                                        [facility]offi Barriga [facility]         

           

 

                    Systolic (mm Hg)                            140                             2018

                                                        Koffi Barriga MD, [facility]         

           

 

                    Weight                            266                             2017        

                                                      Enayet Rahim                    

 

                    Height                            70                             2017         

                                                      Enayet Rahim                    

 

                    Temperature Oral (F)                            97.5 F                            2017

                                                        Enayet Rahim                 

   

 

                    Diastolic (mm Hg)                            91                             2017

                                                        Enayet Rahim                 

   

 

                    Systolic (mm Hg)                            164                             2017

                                                        Enayet Rahim                 

   

 

                    Weight                            265                             10/05/2017        

                                                      Koffi Barriga MD, PA                 

   

 

                    Heart Rate                            78                             10/05/2017     

                                                      Koffi Barriga MD, [facility]              

      

 

                    Diastolic (mm Hg)                            65                             10/05/2017

                                                        Koffi Barriga MD, PA         

           

 

                    Systolic (mm Hg)                            138                             10/05/2017

                                                        Koffi Barriga MD, PA         

           

 

                    Weight                            261                             2017        

                                                      Enayet Rahim                    

 

                    Height                            70                             2017         

                                                      Enayet Rahim                    

 

                    Temperature Oral (F)                            98.3 F                            2017

                                                        Enayet Rahim                 

   

 

                    Diastolic (mm Hg)                            67                             2017

                                                        Enayet Rahim                 

   

 

                    Systolic (mm Hg)                            109                             2017

                                                        Enayet Rahim                 

   

 

                    Weight                            265                             2017        

                                                      Koffi Barriga MD, [facility]                 

   

 

                    Heart Rate                            76                             2017     

                                                      [facility]i Barriga [facility]              

      

 

                    Diastolic (mm Hg)                            85                             2017

                                                        Koffi Barriga [facility], [facility]         

           

 

                    Systolic (mm Hg)                            133                             2017

                                                        Koffi Barriga MD, [facility]         

           

 

                    Weight                            264                             2016        

                                                      Enayet Rahim                    

 

                    Height                            70                             2016         

                                                      Enayet Rahim                    

 

                    Temperature Oral (F)                            99.1 F                            2016

                                                        Enayet Rahim                 

   

 

                    Diastolic (mm Hg)                            55                             2016

                                                        Enayet Rahim                 

   

 

                    Systolic (mm Hg)                            140                             2016

                                                        Enayet Rahim                 

   

 

                    Weight                            270                             2016        

                                                      Koffi Barriga MD, [facility]                 

   

 

                    Heart Rate                            85                             2016     

                                                      [facility]              

      

 

                    Diastolic (mm Hg)                            85                             2016

                                                        Koffi Barriga [facility]         

           

 

                    Systolic (mm Hg)                            115                             2016

                                                        Koffi Barriga [facility]         

           

 

                    Weight                            270                             2016        

                                                      Koffi Barriga [facility]                 

   

 

                    Heart Rate                            78                             2016     

                                                      [facility]              

      

 

                    Diastolic (mm Hg)                            70                             2016

                                                        [facility] Barr[facility]         

           

 

                    Systolic (mm Hg)                            125                             2016

                                                        [facility]offi Barriga [facility]         

           

 

                    Systolic (mm Hg)                            187                             2015

                                                        [facility]                 

   

 

                    Diastolic (mm Hg)                            88                             2015

                                                        [facility]                 

   

 

                    Temperature Oral (F)                            98 F                            2015

                                                        [facility]                 

   

 

                    Respitory Rate                            18                             2015 

                                                       [facility]                  

  

 

                    Heart Rate                            46                             2015     

                                                      [facility]                    

 

                    Temperature Oral (F)                            98 F                            2015

                                                        [facility]                 

   

 

                    Respitory Rate                            18                             2015 

                                                       [facility]                  

  

 

                    Diastolic (mm Hg)                            95                             2015

                                                        [facility]                 

   

 

                    Heart Rate                            51                             2015     

                                                      [facility]                    

 

                    Systolic (mm Hg)                            151                             2015

                                                        [facility]                 

   

 

                    Respitory Rate                            16                             2015 

                                                       [facility]                  

  

 

                    Systolic (mm Hg)                            163                             2015

                                                        [facility]                 

   

 

                    Diastolic (mm Hg)                            82                             2015

                                                        [facility]                 

   

 

                    Heart Rate                            90                             2015     

                                                      [facility]                    

 

                    Temperature Oral (F)                            97.4 F                            2015

                                                        [facility]                 

   

 

                    Height                            177.8 cm                            2015    

                                                      [facility]                    

 

                    Weight                            114.318                             2015    

                                                      [facility]                    

 

                    BMI Calculated                            36.16                             2015

                                                        [facility]                 

   

 

                    BMI Calculated                            38.82                             2015

                                                        [facility]                 

   

 

                    Height                            177.8 cm                            2015    

                                                      [facility]                    

 

                    Weight                            122.727                             2015    

                                                      [facility]                    



                                                                                
                                                                                
                                                                                
                                                                                
                                                                                
                                                                                
                                                                                
                                                                                
                                                                                
                                                                                
                                                                                
                                                                                
                                                                                
                                                                                
                                                                                
                                                                                
                                                                                
                                        



Encounters

                    





                    Location                            Location Details                            Encounter

 Type                            Encounter Number                            Reason For

 Visit                            Attending Provider                            ADM Date

                            DC Date                            Status                

                                        Source                    

 

                          [facility]                                               

                    Inpatient                            [id number]                             

                           Kashmir Teqwimuah                             2015                                                    

                                        [facility]                                                        TROUBLE

 URINATING                              [id number]         

                                                                              2015                                               

                                        Koffi Barriga MD, [facility]                    

 

                    [facility]                                                        TROUBLE

 URINATING                              [id number]         

                                                                              2015                                               

                                        Koffi Barriga MD, [facility]                                                        TROUBLE

 URINATING                              [id number]         

                                                                              2015                                               

                                        [facility]                                                        TROUBLE

 URINATING                              [id number]         

                                                                              2015                                               

                                        Koffi Barriga MD, PA                    

 

                    [facility]                                                        TROUBLE

 URINATING                              [id number]         

                                                                              2015                                               

                                        Koffi Barriga MD, [facility]                                                        Unknown

                                        [id number]                   

                                                                              2015                                                        Koffi Barriga MD, [facility]                    

 

                    [facility]                                                        Unknown

                                        [id number]                   

                                                                              2015                                                        Koffi Barriga MD, [facility]                    

 

                    [facility]                                                        Unknown

                                        [id number]                   

                                                                              2015                                                        [facility]                                                        [facility]

                                        [id number]                   

                                                                              2015                                                        Koffi Barriga MD, [facility]                    

 

                    [facility]                                                        Unknown

                                        [id number]                   

                                                                              2015                                                        [facility] Barriga [facility]                    

 

                    [facility]                                                        Follow-Up

                                        [id number]                   

                                                                              2016                                                        Koffi Barriga MD, [facility]                    

 

                    Koffi Barriga MD, PA                                                        Follow-Up

                                        [id number]                   

                                                                              2016                                                        [facility]offi Barriga MD, [facility]                    

 

                    Koffi Barriga MD, PA                                                        Follow-Up

                                        [id number]                   

                                                                              2016                                                        Vivian Barriga MD, [facility]                                                        Follow-Up

                                        [id number]                   

                                                                              2016                                                        Koffi Barriga MD, [facility]                    

 

                    Koffi Barriga MD, PA                                                        Follow-Up

                                        [id number]                   

                                                                              2016                                                        Koffi Barriga MD, PA                    

 

                    Koffi Barriga MD, PA                                                        Follow-Up

                                        [id number]                   

                                                                              2016                                                        Koffi Barriga MD, PA                    

 

                    Koffi Barriga MD, PA                                                        echo/carotid/arterial

 dopplers                               [id number]          

                                                                              2016                                                

                                        Koffi Barriga MD, PA                    

 

                    Koffi Barriga MD, PA                                                        echo/carotid/arterial

 dopplers                               [id number]          

                                                                              2016                                                

                                        Koffi Barriga MD, PA                    

 

                    Koffi Barriga MD, PA                                                        Follow-Up

                                        [id number]                   

                                                                              2016                                                        Vivian Barriga MD, PA                                                        Follow-Up

                                        [id number]                   

                                                                              2016                                                        Koffi Barriga MD, PA                    

 

                    Koffi Barriga MD, PA                                                        echo/carotid/arterial

 dopplers                               [id number]          

                                                                              2016                                                

                                        Vivian Barriga MD, PA                                                        echo/carotid/arterial

 dopplers                               [id number]          

                                                                              2016                                                

                                        Koffi Barriga MD, PA                    

 

                    [facility]ivian Cedillo MD, PA                                                        NEW PT  

                                        [id number]                     

                                                                              2016                                                        Vivian Barriga MD, PA                                                        Follow-Up

                                        [id number]                   

                                                                              2017                                                        Vivian Barriga MD, PA                                                        Follow-Up

                                        [id number]                   

                                                                              2017                                                        Koffi Barriga MD, PA                    

 

                                                                            Discharged Inpatient        

                          [id number]                                                     

                    AMY COATES MD                            2018                                                        [facility]                    



                                                                                
                                                                                
                                                                                
                                                                                
                                                                            



Procedures

                    





                    Procedure                            Code                            Date           

                          Perfomer                            Comments                        

                                        Source                    

 

                          X-ray of chest, two views                            [id number]                    

                    2018                            RIGOBERTO                                    

                                        [facility]                    

 

                          Magnetic resonance imaging of brain without contrast                            [id number]

                            2018                            AMINATA             

                                                      [facility]   

                 

 

                    Procedure on knee                            [id number]                              

                                                                                  [facility]

                    

 

                    Tonsillectomy                            [id number]                                  

                                                                              [facility]

## 2018-12-13 NOTE — DIAGNOSTIC IMAGING REPORT
Examination: Single AP view of the chest.



COMPARISON: 3/4/2018



INDICATION: Short of breath

     

DISCUSSION:



Lungs are well-inflated. No consolidation, pleural effusion, or pneumothorax.

Tortuosity of the thoracic aorta with otherwise normal cardiomediastinal

contour for portable, AP technique.



No acute osseous abnormality.



IMPRESSION:

 

1.   No acute cardiopulmonary abnormalities.



Signed by: Dr. Silvio Kohler M.D. on 12/13/2018 11:41 AM

## 2018-12-13 NOTE — DIAGNOSTIC IMAGING REPORT
EXAMINATION: CT of the chest with contrast, PE protocol.



TECHNIQUE:  Spiral CT images of the chest were performed from the lung apices

through the level of the adrenal glands after the IV administration of 100 cc

of Isovue-370. Thin section reconstructions were obtained with special

concentration on the pulmonary arteries.



COMPARISON: Chest radiograph same day



CLINICAL HISTORY:Shortness of breath, concern for pulmonary embolus

     

DISCUSSION:



Vasculature: The main pulmonary artery, right and left pulmonary arteries, and

their visualized lobar and segmental pulmonary arteries are patent, without

filling defect. The pulmonary outflow tract is of normal caliber. No ectasia or

aneurysmal dilatation of the thoracic aorta. Atherosclerotic calcification of

the thoracic aorta and great vessel origins, with great vessel origin

tortuosity. Native coronary artery calcifications.



Lungs:  Groundglass and reticular opacities in the dependent lower lobes left

greater than right, compatible with subsegmental atelectasis. Scattered left

upper lobe groundglass opacities for example on series 3 image 47. Linear

opacity medially within the right middle lobe and in the inferior lingula,

compatible with subsegmental atelectasis.



Airways:  Trachea, mainstem bronchi, and central lobar and segmental bronchi

are patent.



Pleura:  <There is no evidence of pleural effusion or pneumothorax.>



Heart and mediastinum:  No hilar, mediastinal, or axillary lymphadenopathy.

There is concentric wall thickening of the esophagus at the thoracic inlet,

exemplified on series 2 image 23. Normal heart size.



Abdomen:  Visualized portions of the liver, gallbladder, spleen, pancreas,

adrenals are unremarkable. Left upper pole renal cyst partially visualized.



Bones and soft tissues:  No focal soft tissue abnormalities. No osseous

destructive lesions.



IMPRESSION: 

No CT evidence of pulmonary embolus to the level of the segmental branch

pulmonary arteries.



Groundglass opacities in the left upper and lower lobes may indicate

atelectasis, aspiration pneumonitis, or atypical infection in the appropriate

clinical setting.



Questionable esophageal wall thickening at the thoracic inlet may relate to

peristalsis. Correlate for symptoms of dysphagia. Gastroenterology consultation

for potential upper endoscopy is suggested to exclude presence of a mass

lesion.



Atherosclerotic vascular disease.









Signed by: Dr. Silvio Kohler M.D. on 12/13/2018 1:07 PM

## 2018-12-13 NOTE — XMS REPORT
Koffi Barriga MD, PA

                             Created on: 10/17/2017



Godfrey Bryant

External Reference #: [id number]

: 1945

Sex: Male



Demographics







                          Address                   [address] [place]  [address]

 

                          Home Phone                [phone]

 

                          Preferred Language        Unknown

 

                          Marital Status            Unknown

 

                          Restoration Affiliation     Unknown

 

                          Race                      Unknown

 

                          Ethnic Group              Non-





Author







                          Author                    Koffi Barriga

 

                          [facility]              [employer]

 

                          Address                   Unknown

 

                          Phone                     Unavailable







Care Team Providers







                    Care Team Member Name    Role                Phone

 

                    Koffi Barriga                      Unavailable



                                                                



Allergies

          No Known Allergies                                                    
                                   



Problems

          





             Problem Type    Condition    Code         Onset Dates    Condition Status

 

             Problem      Chronic diastolic heart failure    428.32                    Active

 

             Problem      Occlusion and stenosis of right carotid artery    I65.21                    Active

 

             Problem      Abnormal electrocardiogram [ECG] [EKG]    R94.31                    Active

 

             Problem      Obesity, unspecified    E66.9                     Active

 

             Problem      CHF, chronic systolic    I50.22                    Active

 

             Problem      Body mass index (BMI) 38.0-38.9, adult    Z68.38                    Active

 

             Problem      Peripheral vascular disease, unspecified    I73.9                     Active

 

                          Problem                   Type 2 diabetes mellitus with diabetic peripheral angiopathy without gangrene

                    E11.51                                  Active

 

             Problem      Coronary angioplasty status    Z98.61                    Active

 

                          Problem                   Atherosclerotic heart disease of native coronary artery without angina 

pectoris            I25.10                                  Active

 

             Problem      Coronary atherosclerosis of native coronary artery    414.01                    Active

 

             Problem      Peripheral vascular disease    443.9                     Active

 

             Problem      Diabetes mellitus type II    250.00                    Active

 

             Problem      Abnormal ECG    794.31                    Active

 

             Problem      Morbid obesity    278.01                    Active

 

             Problem      Postprocedural PCI status    V45.82                    Active

 

             Problem      Mixed hyperlipidemia    272.2                     Active

 

             Problem      Carotid stenosis    433.10                    Active



                                                                                
                                                                                
                                                                                
                  



Medications

          





        Medication    Code System    Code    Instructions    Start Date    End Date    Status    Dosage



 

        Aspirin    NDC     21821983763    81 MG Orally Once a day                    Active    1 tablet

 

        Clopidogrel Bisulfate    NDC     74146166115    75 MG Orally Once a day                    Active    

1 tablet

 

        Fish Oil    NDC     52277312207    500 MG Orally Twice a day                    Active    1 capsule

 

        Metformin HCl    NDC     38088809127    500 MG Orally Twice a day                    Active    1 tablet

 with meals

 

          Metoprolol Succinate ER    NDC       58519195940    50 MG Orally Once a day                        Active

                                        1 tablet

 

        Simvastatin    NDC     40259080001    10 mg Orally Once a day                    Active    1 tablet in

 the evening

 

        Humalog    NDC     13441011533    100 UNIT/ML Subcutaneous                     Active    not defined

 

        Tradjenta    NDC     86577019853     Orally Once a day                    Active    1 tablet

 

        Levemir    NDC     64446692755    100 UNIT/ML Subcutaneous                     Active    not defined

 

        Losartan Potassium    NDC     28605493464    50 MG Orally Once a day                    Active    1 tablet





                                                                                
                                                                                
        



Results

          No Known Results                                                      
              



Summary Purpose

          [employer]Clinical[employer] Submission

## 2018-12-13 NOTE — XMS REPORT
[facility]

                             Created on: 10/23/2018



Godfrey Bryant

External Reference #: [id number]

: 1945

Sex: Male



Demographics







                          Address                   [address] [place] [address] [place]  [address]

 

                          Home Phone                [phone]

 

                          Preferred Language        Unknown

 

                          Marital Status            Unknown

 

                          Mormon Affiliation     Unknown

 

                          Race                      Unknown

 

                          Ethnic Group              UNK





Author







                          Author                    Rick Dozier

 

                          Organization              [employer]

 

                          Address                   Unknown

 

                          Phone                     Unavailable







Care Team Providers







                    Care Team Member Name    Role                Phone

 

                    Rick Dozier      CP                  Unavailable



                                                                



Allergies, Adverse Reactions, Alerts

          





                    Substance           Reaction            Event Type

 

                    N.K.D.A.            Info Not Available    Non Drug Allergy



                                                                                
       



Problems

          





             Problem Type    Condition    Code         Onset Dates    Condition Status

 

             Assessment    Perforated right tympanic membrane on examination    H72.91                    Active



 

             Problem      PAD (peripheral artery disease)    I73.9                     Active

 

             Problem      Carotid arterial disease    I77.9                     Active

 

             Problem      Other chronic pain    G89.29                    Active

 

             Problem      Diabetes     E11.9                     Active

 

             Problem      HTN (hypertension)    I10                       Active

 

             Problem      BPH (benign prostatic hyperplasia)    N40.0                     Active



                                                                                
                                                                   



Medications

          





        Medication    Code System    Code    Instructions    Start Date    End Date    Status    Dosage



 

        Metformin HCl    NDC     37611933746    500 MG Orally Twice a day                    Active    1 tablet

 with meals

 

          Ciprodex    NDC       25642547658    0.3-0.1 % Otic Twice a day    Oct 18, 2018              Active 

                                        4 drops into affected ear

 

        Losartan Potassium    NDC     [address]-58                            Active    not defined

 

        Tamsulosin HCl    NDC     31539108977    0.4 MG Orally Once a day                    Active    1 capsule



 

        Clopidogrel Bisulfate    NDC     38730930880    75 MG Orally Once a day                    Active    

1 tablet

 

        Simvastatin    NDC     [address]-15                            Active    not defined



                                                                                
                                                                   



Vital Signs

          





                          Date/Time:                Oct 18, 2018

 

                          BMI                       37.02 Index

 

                          Weight                    258 lbs

 

                          Height                    70 in

 

                          Temperature               98.7 F

 

                          Blood Pressure Diastolic    66 mm Hg

 

                          Blood Pressure Systolic    116 mm Hg



                                                                              



Results

          No Known Results                                                      
             



Summary Purpose

          [employer] Submission

## 2018-12-13 NOTE — XMS REPORT
Vivian Cedillo MD, PA

                             Created on: 2017



Godfrey Bryant

External Reference #: [id number]

: 1945

Sex: Male



Demographics







                          Address                   [address] [place]  [address]

 

                          Home Phone                [phone]

 

                          Preferred Language        Unknown

 

                          Marital Status            Unknown

 

                          Faith Affiliation     Unknown

 

                          Race                      Unknown

 

                          Ethnic Group              Non-





Author







                          Author                    Rick Dozier

 

                          [facility]              [employer]

 

                          Address                   Unknown

 

                          Phone                     Unavailable







Care Team Providers







                    Care Team Member Name    Role                Phone

 

                    Rick Dozier                        Unavailable



                                                                



Allergies, Adverse Reactions, Alerts

          





                    Substance           Reaction            Event Type

 

                    N.K.D.A.            Info Not Available    Non Drug Allergy



                                                                    



Encounters

          





                    Encounter           Location            Date

 

                     echo/carotid/arterial dopplers    [facility]    2016

 

                    Follow-Up           [facility]    2016

 

                    Follow-Up           [facility]    2017

 

                    NEW PT              [facility]    Aug 19, 2016

 

                    TROUBLE URINATING    [facility]    2015

 

                    Unknown             [facility]    2015

 

                    Follow-Up           [facility]    2016



                                                                                
                                                                   



Problems

          





             Problem Type    Condition    ICD-9 Code    Onset Dates    Condition Status

 

             Assessment    Carotid arterial disease    I77.9                     Active

 

             Assessment    Other bilateral secondary osteoarthritis of knee    M17.4                     Active

 

             Problem      Diabetes     E11.9                     Active

 

             Problem      BPH (benign prostatic hyperplasia)    N40.0                     Active

 

             Problem      HTN (hypertension)    I10                       Active

 

             Assessment    Diabetes     E11.9                     Active

 

             Assessment    BPH (benign prostatic hyperplasia)    N40.0                     Active

 

             Problem      Carotid arterial disease    I77.9                     Active

 

             Assessment    HTN (hypertension)    I10                       Active



                                                                                
                                                                                
      



Medications

          





        Medication    Code System    Code    Instructions    Start Date    End Date    Status    Dosage



 

           Tamsulosin HCl    [facility]    [address]-83    0.4 MG Orally once a day               Feb 15, 2017

                          Active                    1 capsule



                                                                                
       



Social History

          





                    Social History Element    Qualifiers          Date Reported

 

                    Tobacco Use:        .  Are you a: Never Smoker    Aug 19, 2016

 

                    Marital Status:     .           Aug 19, 2016

 

                    Caffeine intake?    .  Status: Yes, What type: Tea    Aug 19, 2016

 

                    Do you exercise?    .  Answer: No       Aug 19, 2016

 

                    Do you drink alcohol?    .  Do you drink alcohol? No    Aug 19, 2016

 

                    Travel outside [place]:    .  NO               Aug 19, 2016

 

                    Occupation:         .  retired          Aug 19, 2016



                                                                                
                                                                             



Family history

          





                Qualifier       Description     Comment         Date Reported

 

                Maternal Grandmother                    Comment not available    Aug 19, 2016

 

                Paternal Grandmother                    Comment not available    Aug 19, 2016

 

                Children                        Comment not available    Aug 19, 2016

 

                Maternal Grandfather                    Comment not available    Aug 19, 2016

 

                Father                  Comment not available    Aug 19, 2016

 

                Brother(s)                      Comment not available    Aug 19, 2016

 

                Mother                  Comment not available    Aug 19, 2016

 

                Paternal Grandfather                    Comment not available    Aug 19, 2016

 

                Sister(s)       alive           Comment not available    Aug 19, 2016

 

                Other:                          Comment not available    Aug 19, 2016

 

                General Family History                    Comment not available    Aug 19, 2016



                                                                                
                                                                                
                                    



Vital Signs

          





                          Date/Time:                Aug 19, 2016

 

                          Weight                    264 lbs

 

                          Height                    70 in

 

                          Temperature               99.1 F

 

                          Blood Pressure Diastolic    55 mm Hg

 

                          Blood Pressure Systolic    140 mm Hg



                                                                    



Summary Purpose

          [employer]ClinicalWorks Submission

## 2018-12-13 NOTE — XMS REPORT
Summary of Care: 6/18/15 - 6/21/15

                             Created on: 2048



ANIKA HERNANDEZ, ANDREW LÓPEZ

External Reference #: [id number]

: 1945

Sex: Male



Demographics







                          Address                   [address] [place]  [address]-

 

                          Home Phone                [phone]

 

                          Preferred Language        English

 

                          Marital Status            Single

 

                          Christianity Affiliation     None

 

                          Race                      White/

 

                          Ethnic Group              Non-





Author







                          Organization              Unknown

 

                          Address                   Unknown

 

                          Phone                     Unavailable







Encounter





JORGE A Grady(FIN) [id number] Date(s): 6/18/15 - 6/21/15

[facility] [address] [place] [address]-     [phone]
[phone]

Discharge Disposition: Home

Physician Attending: Kashmir Soto DO

Physician Admitting: Kashmir Soto DO





Vital Signs







                    1                   2                   3



                                         Most recent to   



                                         oldest [Reference   



                                         Range]:   

 

                                        177.8 cm 

                          (6/19/15 5:46 AM)         177.8 cm 

                          (6/18/15 9:48 PM)          



                                         Height   

 

                                        98 DegF 

                          (6/21/15 12:03 PM)        98 DegF 

                          (6/21/15 8:00 AM)         97.4 DegF 

                                        (6/21/15 3:36 AM)



                                         Temperature Oral   



                                         [96.4-99.1 DegF]   

 

                                        187/88 mmHg 

*HI*

                          (6/21/15 12:03 PM)        163/82 mmHg 

*HI*

                          (6/21/15 3:36 AM)          



                                         Blood Pressure   



                                         [/60-90 mmHg]   

 

                                        151 mmHg 

*HI*

                    (6/21/15 8:00 AM)                         



                                         Systolic Blood   



                                         Pressure [   



                                         mmHg]   

 

                                        95 mmHg 

*HI*

                    (6/21/15 8:00 AM)                         



                                         Diastolic Blood   



                                         Pressure [60-90   



                                         mmHg]   

 

                                        18 BRMIN 

                          (6/21/15 12:03 PM)        18 BRMIN 

                          (6/21/15 8:00 AM)         16 BRMIN 

                                        (6/21/15 7:09 AM)



                                         Respiratory Rate   



                                         [14-20 BRMIN]   

 

                                        46 bpm 

*LOW*

                          (6/21/15 12:03 PM)        51 bpm 

*LOW*

                          (6/21/15 8:00 AM)         90 bpm 

                                        (6/21/15 3:36 AM)



                                         Peripheral Pulse   



                                         Rate [ bpm]   

 

                                        114.318 kg 

                          (6/19/15 5:46 AM)         122.727 kg 

                          (6/18/15 9:48 PM)          



                                         Weight   

 

                                        36.16 m2 

                          (6/19/15 5:46 AM)         38.82 m2 

                          (6/18/15 9:48 PM)          



                                         Body Mass Index   







Problem List







    



              Condition     Effective Dates     Status       Health Status     Informant

 

    



                           Diabetes(Confirmed)       Resolved  

 

    



                           Diabetes(Confirmed)       Active  

 

    



                           Hypercholesterolemia      Active  



                                         (Confirmed)    

 

    



                           Hypertension(Confirm      Active  



                                         ed)    







Allergies, Adverse Reactions, Alerts







   



                 Substance       Reaction        Severity        Status

 

   



                           NKDA                      Active







Medications





amLODIPine

PO, Daily, 0 Refill(s)

Start Date: 6/19/15

Status: Suspended



amLODIPine

2.5 mg, 1 tab, Route: PO, Drug form: TAB, Daily, Dosing Weight 114.318, kg, Star
t date: 06/20/15 9:00:00, Duration: 30 day, Stop date: 07/19/15 9:00:00

Notes: (Same as: Norvasc)

Start Date: 6/20/15

Stop Date: 6/21/15

Status: Discontinued



aspirin

81 mg, 1 tab, Route: PO, Drug form: CHEWTAB, Daily, Dosing Weight 114.318, kg, S
tart date: 06/20/15 9:00:00, Duration: 30 day, Stop date: 07/19/15 9:00:00

Notes: Take with food.

Start Date: 6/20/15

Stop Date: 6/20/15

Status: Discontinued



aspirin

81 mg, 1 tab, Route: PO, Drug form: ECTAB, Daily, Dosing Weight 122.727, kg, Sta
rt date: 06/19/15 9:00:00, Duration: 30 day, Stop date: 07/18/15 9:00:00

Notes: Do not crush or chew.(Same As: Ecotrin)

Start Date: 6/19/15

Stop Date: 6/21/15

Status: Discontinued



aspirin

81 mg, PO, Daily, 0 Refill(s)

Start Date: 6/19/15

Status: Suspended



atropine

0.5 mg, 5 mL, Route: IVP, Drug form: INJ, PRN, PRN Bradycardia, Start date: 06/1
9/15 6:05:00, Duration: 30 day, Stop date: 07/19/15 6:04:00

Start Date: 6/19/15

Stop Date: 6/21/15

Status: Discontinued



azithromycin

500 mg, 250 mL, Route: IVPB, Drug form: PDR/INJ, [id number], Dosing Weight 122.727,
kg, Start date: 06/19/15 6:00:00, Duration: 30 day, Stop date: 07/18/15 6:00:00

Notes: Same as: Zithromax

Start Date: 6/19/15

Stop Date: 6/21/15

Status: Discontinued



Crestor

PO, Bedtime, 0 Refill(s)

Start Date: 6/19/15

Status: Suspended



Dextrose 50% Syringe

12.5 gm, 25 mL, Route: IVP, Drug Form: INJ, Dosing Weight 122.727, kg, PRN, PRN 
Blood Glucose Results, Start date: 06/19/15 5:25:00, Duration: 30 day, Stop date
: 07/19/15 5:24:00

Start Date: 6/19/15

Stop Date: 6/19/15

Status: Discontinued



Dextrose 50% Syringe

25 gm, 50 mL, Route: IVP, Drug Form: INJ, Dosing Weight 122.727, kg, PRN, PRN Bl
ood Glucose Results, Start date: 06/19/15 5:25:00, Duration: 30 day, Stop date: 
07/19/15 5:24:00

Start Date: 6/19/15

Stop Date: 6/19/15

Status: Discontinued



Dextrose 50% Syringe

12.5 gm, 25 mL, Route: IVP, Drug Form: INJ, Dosing Weight 114.318, kg, PRN, PRN 
Blood Glucose Results, Start date: 06/19/15 17:24:00, Duration: 30 day, Stop nadir
e: 07/19/15 17:23:00

Start Date: 6/19/15

Stop Date: 6/21/15

Status: Discontinued



Dextrose 50% Syringe

25 gm, 50 mL, Route: IVP, Drug Form: INJ, Dosing Weight 114.318, kg, PRN, PRN Bl
ood Glucose Results, Start date: 06/19/15 17:24:00, Duration: 30 day, Stop date:
07/19/15 17:23:00

Start Date: 6/19/15

Stop Date: 6/21/15

Status: Discontinued



DuoNeb inhalation solution

3 ml, Route: NEB, Drug Form: SOLN, Dosing Weight 122.727, kg, PRN, PRN Respirato
ry Protocol, Start date: 06/19/15 1:25:00, Duration: 30 day, Stop date: 07/19/15
1:24:00

Notes: (Same as: Duoneb)

Start Date: 6/19/15

Stop Date: 6/19/15

Status: Discontinued



DuoNeb inhalation solution

3 ml, Route: NEB, Drug Form: SOLN, Dosing Weight 122.727, kg, PRN, PRN Respirato
ry Protocol, Start date: 06/19/15 1:24:00, Duration: 30 day, Stop date: 07/19/15
1:23:00

Notes: (Same as: Duoneb)

Start Date: 6/19/15

Stop Date: 6/19/15

Status: Discontinued



glucagon

1 mg, Route: IM, Drug form: PDR/INJ, PRN, Dosing Weight 122.727, kg, PRN Blood G
lucose Results, Start date: 06/19/15 5:25:00, Duration: 30 day, Stop date: 07/19
/15 5:24:00

Start Date: 6/19/15

Stop Date: 6/19/15

Status: Discontinued



glucagon

1 mg, Route: IM, Drug form: PDR/INJ, PRN, Dosing Weight 114.318, kg, PRN Blood G
lucose Results, Start date: 06/19/15 17:24:00, Duration: 30 day, Stop date: 07/1
9/15 17:23:00

Start Date: 6/19/15

Stop Date: 6/21/15

Status: Discontinued



Humalog

Route: SUB-Q, Before Lunch, Dosing Weight 114.318, kg, Start date: 06/20/15 11:3
0:00, Duration: 30 day, Stop date: 07/19/15 11:30:00

Start Date: 6/20/15

Stop Date: 6/19/15

Status: Deleted



Humalog

Route: SUB-Q, Before Breakfast, Dosing Weight 114.318, kg, Start date: 06/20/15 
7:30:00, Duration: 30 day, Stop date: 07/19/15 7:30:00

Start Date: 6/20/15

Stop Date: 6/19/15

Status: Deleted



Humalog

Route: SUB-Q, Before Dinner, Dosing Weight 114.318, kg, Start date: 06/19/15 16:
30:00, Duration: 30 day, Stop date: 07/18/15 16:30:00

Start Date: 6/19/15

Stop Date: 6/19/15

Status: Deleted



Humalog

20 unit, Route: SUB-Q, PRN, Dosing Weight 114.318, kg, PRN With Snack, Start nadir
e: 06/19/15 14:52:00, Duration: 30 day, Stop date: 07/19/15 14:51:00

Start Date: 6/19/15

Stop Date: 6/19/15

Status: Discontinued



Humalog 100 units/mL

40, SUB-Q, Before Lunch, 0 Refill(s)

Start Date: 6/19/15

Status: Suspended



Humalog 100 units/mL

20, SUB-Q, PRN, with snacks, 0 Refill(s)

Special Instructions: with snacks

Start Date: 6/19/15

Stop Date: 6/19/15

Status: Discontinued



Humalog 100 units/mL

36, SUB-Q, Before Breakfast, 0 Refill(s)

Start Date: 6/19/15

Status: Suspended



Humalog 100 units/mL

45, SUB-Q, Before Dinner, 0 Refill(s)

Start Date: 6/19/15

Status: Suspended



insulin aspart

1 unit, 0.01 mL, Route: SUB-Q, Drug form: SOLN, TID-Before Meals, Dosing Weight 
122.727, kg, PRN Blood Glucose Results, Start date: 06/19/15 5:25:00, Duration: 
30 day, Stop date: 07/19/15 5:24:00

Notes: Roll in palms of hands gently;  Do not shake vigorously. (Same as: NovoLO
G)"single patient use only"  Stable for 28 days at room temperature.Expires in _
____ days from ______________Date

Start Date: 6/19/15

Stop Date: 6/19/15

Status: Discontinued



insulin aspart

2 unit, 0.02 mL, Route: SUB-Q, Drug form: SOLN, TID-Before Meals, Dosing Weight 
122.727, kg, PRN Blood Glucose Results, Start date: 06/19/15 5:25:00, Duration: 
30 day, Stop date: 07/19/15 5:24:00

Notes: Roll in palms of hands gently;  Do not shake vigorously. (Same as: NovoLO
G)"single patient use only"  Stable for 28 days at room temperature.Expires in _
____ days from ______________Date

Start Date: 6/19/15

Stop Date: 6/19/15

Status: Discontinued



insulin aspart

3 unit, 0.03 mL, Route: SUB-Q, Drug form: SOLN, TID-Before Meals, Dosing Weight 
122.727, kg, PRN Blood Glucose Results, Start date: 06/19/15 5:25:00, Duration: 
30 day, Stop date: 07/19/15 5:24:00

Notes: Roll in palms of hands gently;  Do not shake vigorously. (Same as: NovoLO
G)"single patient use only"  Stable for 28 days at room temperature.Expires in _
____ days from ______________Date

Start Date: 6/19/15

Stop Date: 6/19/15

Status: Discontinued



insulin aspart

4 unit, 0.04 mL, Route: SUB-Q, Drug form: SOLN, TID-Before Meals, Dosing Weight 
122.727, kg, PRN Blood Glucose Results, Start date: 06/19/15 5:25:00, Duration: 
30 day, Stop date: 07/19/15 5:24:00

Notes: Roll in palms of hands gently;  Do not shake vigorously. (Same as: NovoLO
G)"single patient use only"  Stable for 28 days at room temperature.Expires in _
____ days from ______________Date

Start Date: 6/19/15

Stop Date: 6/19/15

Status: Discontinued



insulin aspart

5 unit, 0.05 mL, Route: SUB-Q, Drug form: SOLN, TID-Before Meals, Dosing Weight 
122.727, kg, PRN Blood Glucose Results, Start date: 06/19/15 5:25:00, Duration: 
30 day, Stop date: 07/19/15 5:24:00

Notes: Roll in palms of hands gently;  Do not shake vigorously. (Same as: NovoLO
G)"single patient use only"  Stable for 28 days at room temperature.Expires in _
____ days from ______________Date

Start Date: 6/19/15

Stop Date: 6/19/15

Status: Discontinued



insulin aspart

12 unit, 0.12 mL, Route: SUB-Q, Drug form: SOLN, TID-Before Meals, Dosing Weight
114.318, kg, PRN Blood Glucose Results, Start date: 06/19/15 17:24:00, Duration:
30 day, Stop date: 07/19/15 17:23:00

Notes: Roll in palms of hands gently;  Do not shake vigorously. (Same as: NovoLO
G)"single patient use only"  Stable for 28 days at room temperature.Expires in _
____ days from ______________Date

Start Date: 6/19/15

Stop Date: 6/21/15

Status: Discontinued



insulin aspart

15 unit, 0.15 mL, Route: SUB-Q, Drug form: SOLN, TID-Before Meals, Dosing Weight
114.318, kg, PRN Blood Glucose Results, Start date: 06/19/15 17:24:00, Duration:
30 day, Stop date: 07/19/15 17:23:00

Notes: Roll in palms of hands gently;  Do not shake vigorously. (Same as: NovoLO
G)"single patient use only"  Stable for 28 days at room temperature.Expires in _
____ days from ______________Date

Start Date: 6/19/15

Stop Date: 6/21/15

Status: Discontinued



insulin aspart

6 unit, 0.06 mL, Route: SUB-Q, Drug form: SOLN, TID-Before Meals, Dosing Weight 
114.318, kg, PRN Blood Glucose Results, Start date: 06/19/15 17:24:00, Duration:
30 day, Stop date: 07/19/15 17:23:00

Notes: Roll in palms of hands gently;  Do not shake vigorously. (Same as: NovoLO
G)"single patient use only"  Stable for 28 days at room temperature.Expires in _
____ days from ______________Date

Start Date: 6/19/15

Stop Date: 6/21/15

Status: Discontinued



insulin aspart

9 unit, 0.09 mL, Route: SUB-Q, Drug form: SOLN, TID-Before Meals, Dosing Weight 
114.318, kg, PRN Blood Glucose Results, Start date: 06/19/15 17:24:00, Duration:
30 day, Stop date: 07/19/15 17:23:00

Notes: Roll in palms of hands gently;  Do not shake vigorously. (Same as: NovoLO
G)"single patient use only"  Stable for 28 days at room temperature.Expires in _
____ days from ______________Date

Start Date: 6/19/15

Stop Date: 6/21/15

Status: Discontinued



insulin aspart

3 unit, 0.03 mL, Route: SUB-Q, Drug form: SOLN, TID-Before Meals, Dosing Weight 
114.318, kg, PRN Blood Glucose Results, Start date: 06/19/15 17:24:00, Duration:
30 day, Stop date: 07/19/15 17:23:00

Notes: Roll in palms of hands gently;  Do not shake vigorously. (Same as: NovoLO
G)"single patient use only"  Stable for 28 days at room temperature.Expires in _
____ days from ______________Date

Start Date: 6/19/15

Stop Date: 6/21/15

Status: Discontinued



ipratropium

0.5 mg, 2.5 mL, Route: NEB, Drug form: SOLN, PRN, Dosing Weight 122.727, kg, PRN
Respiratory Protocol, Start date: 06/19/15 5:07:00, Duration: 30 day, Stop date:
07/19/15 5:06:00

Notes: SEE RT DOCUMENTATION(Same as:Atrovent)

Start Date: 6/19/15

Stop Date: 6/21/15

Status: Discontinued



Levemir

110, SUB-Q, Bedtime, 0 Refill(s)

Start Date: 6/19/15

Status: Suspended



Levemir FlexPen

110 unit, 1.1 mL, Route: SUB-Q, Drug form: INJ, Bedtime, Dosing Weight 114.318, 
kg, Start date: 06/19/15 21:00:00, Duration: 30 day, Stop date: 07/18/15 21:00:0
0

Notes: Same as LevemirDo not hold insulin without contacting prescriber  "single
patient use only"

Start Date: 6/19/15

Stop Date: 6/21/15

Status: Discontinued



metFORMIN

250 mg, PO, BID, 0 Refill(s)

Start Date: 6/19/15

Status: Suspended



methylPREDNISolone SODium SUCCinate

125 mg, 2 mL, Route: IVP, Drug form: INJ, ONCE, Dosing Weight 122.727, kg, Prior
ity: STAT, Start date: 06/19/15 1:25:00, Stop date: 06/19/15 1:25:00

Notes: (Same as:Solu-MEDROL, A-Methapred)

Start Date: 6/19/15

Stop Date: 6/19/15

Status: Completed



nitroglycerin 0.4 mg sublingual tablet

0.4 mg, 1 tab, Route: SL, Drug form: TAB, Q5Min, PRN Chest Pain, Start date: 06/
19/15 6:05:00, Duration: 30 day, Stop date: 07/19/15 6:04:00

Notes: (Same as:Nitroquick, Nitrostat)"Do Not Crush"  Sublingual tablet

Start Date: 6/19/15

Stop Date: 6/21/15

Status: Discontinued



normal saline 0.9% IV 1,000 mL

1,000 mL, Rate: 100 ml/hr, Infuse over: 10 hr, Route: IV, Dosing Weight 122.727 
kg, Total Volume: 1,000, Start date: 06/19/15 5:11:00, Duration: 30 day, Stop da
te: 07/19/15 5:10:00

Start Date: 6/19/15

Stop Date: 6/21/15

Status: Discontinued



NovoLOG FlexPen

45 unit, 0.45 mL, Route: SUB-Q, Drug form: SOLN, Before Dinner, Start date: 06/1
9/15 16:30:00, Duration: 30 day, Stop date: 07/18/15 16:30:00

Notes: Roll in palms of hands gently;  Do not shake vigorously. (Same as: NovoLO
G)"single patient use only"  Stable for 28 days at room temperature.Expires in _
____ days from ______________Date

Start Date: 6/19/15

Stop Date: 6/21/15

Status: Discontinued



NovoLOG FlexPen

40 unit, 0.4 mL, Route: SUB-Q, Drug form: SOLN, Before Lunch, Start date: 06/20/
15 11:30:00, Duration: 30 day, Stop date: 07/19/15 11:30:00

Notes: Roll in palms of hands gently;  Do not shake vigorously. (Same as: NovoMEAGAN
G)"single patient use only"  Stable for 28 days at room temperature.Expires in _
____ days from ______________Date

Start Date: 6/20/15

Stop Date: 6/21/15

Status: Discontinued



NovoLOG FlexPen

36 unit, 0.36 mL, Route: SUB-Q, Drug form: SOLN, Before Breakfast, Start date: 0
6/20/15 7:30:00, Duration: 30 day, Stop date: 07/19/15 7:30:00

Notes: Roll in palms of hands gently;  Do not shake vigorously. (Same as: NovoMEAGAN
G)"single patient use only"  Stable for 28 days at room temperature.Expires in _
____ days from ______________Date

Start Date: 6/20/15

Stop Date: 6/21/15

Status: Discontinued



NS (Bolus) IV

1,000 mL, 1,000 ml/hr, Infuse Over: 1 hr, Route: IV, 1,000, Drug form: INJ, ONCE
, Priority: STAT, Dosing Weight 122.727 kg, Start date: 06/19/15 4:57:00, Durati
on: 1 doses or times, Stop date: 06/19/15 4:57:00

Start Date: 6/19/15

Stop Date: 6/21/15

Status: Completed



Plavix

75 mg, 1 tab, Route: PO, Drug form: TAB, Daily, Dosing Weight 122.727, kg, Start
date: 06/19/15 9:00:00, Duration: 30 day, Stop date: 07/18/15 9:00:00

Notes: (Same As: Plavix)

Start Date: 6/19/15

Stop Date: 6/21/15

Status: Discontinued



pneumococcal 23-valent vaccine

0.5 mL, Route: IM, Drug Form: INJ, Daily, Start date: 06/19/15 9:00:00, Duration
: 1 doses or times, Stop date: 06/19/15 9:00:00

Notes: (Same as: Pneumovax 23)  Refrigerate

Start Date: 6/19/15

Stop Date: 6/19/15

Status: Completed



predniSONE

60 mg, 3 tab, Route: PO, Drug form: TAB, Breakfast, Dosing Weight 122.727, kg, S
tart date: 06/19/15 8:00:00, Duration: 30 day, Stop date: 07/18/15 8:00:00

Notes: Take with food.

Start Date: 6/19/15

Stop Date: 6/19/15

Status: Discontinued



ramipril

10 mg, PO, BID, 0 Refill(s)

Start Date: 6/19/15

Status: Suspended



Saline Flush 0.9%

10 mL, Route: IVP, Drug Form: INJ, Dosing Weight 122.727, kg, PRN, PRN Line Flus
h, Start date: 06/18/15 22:46:00, Duration: 30 day, Stop date: 07/18/15 22:45:00

Notes: preservative free.

Start Date: 6/18/15

Stop Date: 6/20/15

Status: Discontinued



Xopenex

1.25 mg, 3 mL, Route: NEB, Drug form: SOLN, PRN, Dosing Weight 122.727, kg, PRN 
Respiratory Protocol, Start date: 06/19/15 5:07:00, Duration: 30 day, Stop date:
07/19/15 5:06:00

Notes: SEE RT DOCUMENTATION (Same as:Xopenex)Non-Formulary

Start Date: 6/19/15

Stop Date: 6/21/15

Status: Discontinued



Zithromax

500 mg, 250 mL, Route: IVPB, Drug form: PDR/INJ, ONCE, Dosing Weight 122.727, kg
, Priority: STAT, Start date: 06/19/15 4:10:00, Stop date: 06/19/15 4:10:00

Notes: Same as: Zithromax

Start Date: 6/19/15

Stop Date: 6/19/15

Status: Completed



Results





ELECTROLYTES





                    1                   2                   3



                                         Most recent to   



                                         oldest [Reference   



                                         Range]:   

 

                                        143 mEq/L 

                          (6/21/15 4:00 AM)         138 mEq/L 

                          (6/20/15 5:06 AM)         137 mEq/L 

                                        (6/19/15 5:11 AM) 



                                         Sodium Lvl [135-145   



                                         mEq/L]   

 

                                        4.4 mEq/L 

                          (6/21/15 4:00 AM)         4.8 mEq/L 

                          (6/20/15 5:06 AM)         4.6 mEq/L 

                                        (6/19/15 5:11 AM) 



                                         Potassium Lvl   



                                         [3.5-5.1 mEq/L]   

 

                                        109 mEq/L 

                          (6/21/15 4:00 AM)         104 mEq/L 

                          (6/20/15 5:06 AM)         103 mEq/L 

                                        (6/19/15 5:11 AM) 



                                         Chloride Lvl [   



                                         mEq/L]   

 

                                        30 mEq/L 

                          (6/21/15 4:00 AM)         25 mEq/L 

                          (6/20/15 5:06 AM)         27 mEq/L 

                                        (6/19/15 5:11 AM) 



                                         CO2 [24-32 mEq/L]   

 

                                        8.4 mEq/L 

*LOW*

                          (6/21/15 4:00 AM)         13.8 mEq/L 

                          (6/20/15 5:06 AM)         11.6 mEq/L 

                                        (6/19/15 5:11 AM) 



                                         AGAP [10.0-20.0   



                                         mEq/L]   







CHEM PANEL





                    1                   2                   3



                                         Most recent to   



                                         oldest [Reference   



                                         Range]:   

 

                                        1.1 mg/dL 

                          (6/21/15 4:00 AM)         1.4 mg/dL 

                          (6/20/15 5:06 AM)         1.4 mg/dL 

                                        (6/19/15 5:11 AM) 



                                         Creatinine Lvl   



                                         [0.5-1.4 mg/dL]   

 

                                        68 mL/min/1.73m2 1

*NA*

                          (6/21/15 4:00 AM)         51 mL/min/1.73m2 2

*NA*

                          (6/20/15 5:06 AM)         51 mL/min/1.73m2 3

*NA*

                                        (6/19/15 5:11 AM) 



                                         eGFR   

 

                                        39 mg/dL 

*HI*

                          (6/21/15 4:00 AM)         46 mg/dL 

*HI*

                          (6/20/15 5:06 AM)         24 mg/dL 

*HI*

                                        (6/19/15 5:11 AM) 



                                         BUN [7-22 mg/dL]   

 

                                        35 

*HI*

                          (6/21/15 4:00 AM)         15 

                          (6/18/15 9:59 PM)          



                                         B/C Ratio [6-25]   

 

                                        126 mg/dL 4

*HI*

                          (6/21/15 4:00 AM)         318 mg/dL 5

*HI*

                          (6/20/15 5:06 AM)         261 mg/dL 6

*HI*

                                        (6/19/15 5:11 AM) 



                                         Glucose Lvl [70-99   



                                         mg/dL]   

 

                                        5.9 g/dL 

*LOW*

                          (6/21/15 4:00 AM)         7.1 g/dL 

                          (6/18/15 9:59 PM)          



                                         Total Protein   



                                         [6.4-8.4 g/dL]   

 

                                        2.7 g/dL 

*LOW*

                          (6/21/15 4:00 AM)         3.4 g/dL 

*LOW*

                          (6/18/15 9:59 PM)          



                                         Albumin Lvl [3.5-5.0   



                                         g/dL]   

 

                                        3.2 g/dL 

                          (6/21/15 4:00 AM)         3.7 g/dL 

                          (6/18/15 9:59 PM)          



                                         Globulin [2.0-4.0   



                                         g/dL]   

 

                                        0.8 

                          (6/21/15 4:00 AM)         0.9 

                          (6/18/15 9:59 PM)          



                                         A/G Ratio [0.7-1.6]   

 

                                        8.6 mg/dL 

                          (6/21/15 4:00 AM)         9.0 mg/dL 

                          (6/20/15 5:06 AM)         9.5 mg/dL 

                                        (6/19/15 5:11 AM) 



                                         Calcium Lvl   



                                         [8.5-10.5 mg/dL]   

 

                                        74 unit/L 

*HI*

                          (6/21/15 4:00 AM)         28 unit/L 

                          (6/18/15 9:59 PM)          



                                         ALT [0-65 unit/L]   

 

                                        78 unit/L 

*HI*

                          (6/21/15 4:00 AM)         32 unit/L 

                          (6/18/15 9:59 PM)          



                                         AST [0-37 unit/L]   

 

                                        66 unit/L 

                          (6/21/15 4:00 AM)         56 unit/L 

                          (6/18/15 9:59 PM)          



                                         Alk Phos [   



                                         unit/L]   

 

                                        1.1 mg/dL 

                          (6/21/15 4:00 AM)         1.7 mg/dL 

*HI*

                          (6/18/15 9:59 PM)          



                                         Bili Total [0.2-1.3   



                                         mg/dL]   

 

                                        4.06 ng/mL 7

*CRIT*

                    (6/19/15 10:50 PM)                          



                                         Procalcitonin Lvl   



                                         [0.00-0.10 ng/mL]   







1Result Comment: The eGFR is calculated using the CKD-EPI formula. In most 
young, healthy individuals the eGFR will be >90 mL/min/1.73m2. The eGFR declines
with age. An eGFR of 60-89 may be normal in some populations, particularly the 
elderly, for whom the CKD-EPI formula has not been extensively validated. Use of
the eGFR is not recommended in the following populations:



Individuals with unstable creatinine concentrations, including pregnant patients
and those with serious co-morbid conditions.



Patients with extremes in muscle mass or diet. 



The data above are obtained from the National Kidney Disease Education Program (
NKDEP) which additionally recommends that when the eGFR is used in patients with
extremes of body mass index for purposes of drug dosing, the eGFR should be mul
tiplied by the estimated BMI.



2Result Comment: The eGFR is calculated using the CKD-EPI formula. In most 
young, healthy individuals the eGFR will be >90 mL/min/1.73m2. The eGFR declines
with age. An eGFR of 60-89 may be normal in some populations, particularly the 
elderly, for whom the CKD-EPI formula has not been extensively validated. Use of
the eGFR is not recommended in the following populations:



Individuals with unstable creatinine concentrations, including pregnant patients
and those with serious co-morbid conditions.



Patients with extremes in muscle mass or diet. 



The data above are obtained from the National Kidney Disease Education Program (
NKDEP) which additionally recommends that when the eGFR is used in patients with
extremes of body mass index for purposes of drug dosing, the eGFR should be mul
tiplied by the estimated BMI.



3Result Comment: The eGFR is calculated using the CKD-EPI formula. In most 
young, healthy individuals the eGFR will be >90 mL/min/1.73m2. The eGFR declines
with age. An eGFR of 60-89 may be normal in some populations, particularly the 
elderly, for whom the CKD-EPI formula has not been extensively validated. Use of
the eGFR is not recommended in the following populations:



Individuals with unstable creatinine concentrations, including pregnant patients
and those with serious co-morbid conditions.



Patients with extremes in muscle mass or diet. 



The data above are obtained from the National Kidney Disease Education Program (
NKDEP) which additionally recommends that when the eGFR is used in patients with
extremes of body mass index for purposes of drug dosing, the eGFR should be mul
tiplied by the estimated BMI.



4Interpretive Data: Adult reference range values reflect the clinical guidelines

of the American Diabetes Association.



5Interpretive Data: Adult reference range values reflect the clinical guidelines

of the American Diabetes Association.



6Interpretive Data: Adult reference range values reflect the clinical guidelines

of the American Diabetes Association.



7Result Comment: Critical Result(s) called to Melinda Marlow at 2015 
10:00 byky.  Read back OK.



CARDIAC ENZYMES





                    1                   2                   3



                                         Most recent to   



                                         oldest [Reference   



                                         Range]:   

 

                                        909 unit/L 

*HI*

                          (6/21/15 4:00 AM)         1494 unit/L 

*HI*

                          (6/20/15 5:06 AM)         1805 unit/L 

*HI*

                                        (6/19/15 10:50 PM) 



                                         Total CK [   



                                         unit/L]   

 

                                        6.8 ng/mL 

*HI*

                          (6/19/15 10:51 AM)        4.1 ng/mL 

*HI*

                          (6/19/15 5:11 AM)         2.8 ng/mL 

                                        (6/18/15 9:59 PM) 



                                         CK MB [0.5-3.6   



                                         ng/mL]   

 

                                        0.4 

                          (6/19/15 10:51 AM)        0.2 

                          (6/19/15 5:11 AM)         0.3 

                                        (6/18/15 9:59 PM) 



                                         CK MB Index   



                                         [0.0-2.5]   

 

                                        0.04 ng/mL 

                          (6/19/15 10:51 AM)        0.05 ng/mL 

                          (6/19/15 5:11 AM)         0.05 ng/mL 

                                        (6/18/15 9:59 PM) 



                                         Troponin-I   



                                         [0.00-0.40 ng/mL]   

 

                                        565 pg/mL 8

*HI*

                    (6/18/15 9:59 PM)                          



                                         BNP [<=100 pg/mL]   







8Interpretive Data: Elevated results are in line with increasing severity of

congestive heart failure. Minor elevations between 100 and 300

may be seen with Myocardial Ischemia, Sodium retaining drugs,

and compensated/treated heart failure.



LIPIDS





                    1                   2                   3



                                         Most recent to   



                                         oldest [Reference   



                                         Range]:   

 

                                        9.44 

*HI*

                    (6/20/15 5:06 AM)                          



                                         CHD Risk [4.00-7.30]   

 

                                        85 mg/dL 

                    (6/20/15 5:06 AM)                          



                                         Chol [<=199 mg/dL]   

 

                                        257 mg/dL 

*HI*

                    (6/20/15 5:06 AM)                          



                                         Trig [<=149 mg/dL]   

 

                                        9 mg/dL 

*LOW*

                    (6/20/15 5:06 AM)                          



                                         HDL [>=61 mg/dL]   

 

                                        25 mg/dL 

                    (6/20/15 5:06 AM)                          



                                         LDL (Calculated)   



                                         [<=99 mg/dL]   

 

                                        51 

*NA*

                    (6/20/15 5:06 AM)                          



                                         VLDL   







SPECIAL CHEMISTRY





                    1                   2                   3



                                         Most recent to   



                                         oldest [Reference   



                                         Range]:   

 

                                        6.6 % 

*HI*

                    (6/19/15 5:11 AM)                          



                                         Hgb A1C [<=5.6 %]   







URINE AND STOOL





                    1                   2                   3



                                         Most recent to   



                                         oldest [Reference   



                                         Range]:   

 

                                        Marked 

*ABN*

                    (6/20/15 6:42 AM)                          



                                         UA Turbidity [Clear]   

 

                                        Yellow 

*NA*

                    (6/20/15 6:42 AM)                          



                                         UA Color [Yellow]   

 

                                        5.0 

                    (6/20/15 6:42 AM)                          



                                         UA pH [5.0-8.0]   

 

                                        1.020 

                    (6/20/15 6:42 AM)                          



                                         UA Spec Grav   



                                         [<=1.030]   

 

                                        500 mg/dL 

*ABN*

                    (6/20/15 6:42 AM)                          



                                         UA Glucose [Negative   



                                         mg/dL]   

 

                                        Large 

*ABN*

                    (6/20/15 6:42 AM)                          



                                         UA Blood [Negative]   

 

                                        Negative mg/dL 

*NA*

                    (6/20/15 6:42 AM)                          



                                         UA Ketones [Negative   



                                         mg/dL]   

 

                                        30 mg/dL 

*ABN*

                    (6/20/15 6:42 AM)                          



                                         UA Protein [Negative   



                                         mg/dL]   

 

                                        <=1.0 mg/dL 

*NA*

                    (6/20/15 6:42 AM)                          



                                         UA Urobilinogen   



                                         [0.1-1.0 mg/dL]   

 

                                        Negative 

*NA*

                    (6/20/15 6:42 AM)                          



                                         UA Bili [Negative]   

 

                                        Trace 

*ABN*

                    (6/20/15 6:42 AM)                          



                                         UA Leuk Est   



                                         [Negative]   

 

                                        Negative 

                    (6/20/15 6:42 AM)                          



                                         UA Nitrite   



                                         [Negative]   

 

                                        26 /HPF 

*HI*

                    (6/20/15 6:42 AM)                          



                                         UA WBC [0-5 /HPF]   

 

                                        1 /HPF 

                    (6/20/15 6:42 AM)                          



                                         UA RBC [0-2 /HPF]   

 

                                        Many /HPF 

*ABN*

                    (6/20/15 6:42 AM)                          



                                         UA Bacteria [None   



                                         Seen /HPF]   

 

                                        Occasional /LPF 

*NA*

                    (6/20/15 6:42 AM)                          



                                         UA Sq Epi [Few /LPF]   

 

                                        3 /LPF 

*HI*

                    (6/20/15 6:42 AM)                          



                                         UA Hyal Cast [0-2   



                                         /LPF]   

 

                                        Few /LPF 

*NA*

                    (6/20/15 6:42 AM)                          



                                         UA Mucus [None Seen   



                                         /LPF]   







IMMUNOLOGY





                    1                   2                   3



                                         Most recent to   



                                         oldest [Reference   



                                         Range]:   

 

                                        Negative 

*NA*

                    (6/19/15 5:09 AM)                          



                                         [place]-Hep C Ab   



                                         [Negative]   







HEMATOLOGY





                    1                   2                   3



                                         Most recent to   



                                         oldest [Reference   



                                         Range]:   

 

                                        15.2 K/CMM 

*HI*

                          (6/21/15 4:00 AM)         24.6 K/CMM 

*HI*

                          (6/20/15 5:06 AM)         24.3 K/CMM 

*HI*

                                        (6/19/15 5:11 AM) 



                                         WBC [3.7-10.4 K/CMM]   

 

                                        4.57 M/CMM 

*LOW*

                          (6/21/15 4:00 AM)         4.55 M/CMM 

*LOW*

                          (6/20/15 5:06 AM)         4.89 M/CMM 

                                        (6/19/15 5:11 AM) 



                                         RBC [4.70-6.10   



                                         M/CMM]   

 

                                        14.4 g/dL 

                          (6/21/15 4:00 AM)         14.6 g/dL 

                          (6/20/15 5:06 AM)         15.5 g/dL 

                                        (6/19/15 5:11 AM) 



                                         Hgb [14.0-18.0 g/dL]   

 

                                        43.2 % 

                          (6/21/15 4:00 AM)         43.1 % 

                          (6/20/15 5:06 AM)         45.9 % 

                                        (6/19/15 5:11 AM) 



                                         Hct [42.0-54.0 %]   

 

                                        94.5 fL 

*HI*

                          (6/21/15 4:00 AM)         94.7 fL 

*HI*

                          (6/20/15 5:06 AM)         94.0 fL 

                                        (6/19/15 5:11 AM) 



                                         MCV [80.0-94.0 fL]   

 

                                        31.5 pg 

*HI*

                          (6/21/15 4:00 AM)         32.1 pg 

*HI*

                          (6/20/15 5:06 AM)         31.8 pg 

*HI*

                                        (6/19/15 5:11 AM) 



                                         MCH [27.0-31.0 pg]   

 

                                        33.3 g/dL 

                          (6/21/15 4:00 AM)         33.9 g/dL 

                          (6/20/15 5:06 AM)         33.8 g/dL 

                                        (6/19/15 5:11 AM) 



                                         MCHC [32.0-36.0   



                                         g/dL]   

 

                                        15.8 % 

*HI*

                          (6/21/15 4:00 AM)         15.9 % 

*HI*

                          (6/20/15 5:06 AM)         15.9 % 

*HI*

                                        (6/19/15 5:11 AM) 



                                         RDW [11.5-14.5 %]   

 

                                        211 K/CMM 

                          (6/21/15 4:00 AM)         201 K/CMM 

                          (6/20/15 5:06 AM)         196 K/CMM 

                                        (6/19/15 5:11 AM) 



                                         Platelet [133-450   



                                         K/CMM]   

 

                                        8.3 fL 

                          (6/21/15 4:00 AM)         8.6 fL 

                          (6/20/15 5:06 AM)         8.0 fL 

                                        (6/19/15 5:11 AM) 



                                         MPV [7.4-10.4 fL]   

 

                                        84.1 % 

*HI*

                          (6/21/15 4:00 AM)         90.7 % 

*HI*

                          (6/20/15 5:06 AM)         95.1 % 

*HI*

                                        (6/19/15 5:11 AM) 



                                         Segs [45.0-75.0 %]   

 

                                        7.0 % 

*LOW*

                          (6/21/15 4:00 AM)         3.4 % 

*LOW*

                          (6/20/15 5:06 AM)         1.9 % 

*LOW*

                                        (6/19/15 5:11 AM) 



                                         Lymphocytes   



                                         [20.0-40.0 %]   

 

                                        8.7 % 

                          (6/21/15 4:00 AM)         5.8 % 

                          (6/20/15 5:06 AM)         3.0 % 

                                        (6/19/15 5:11 AM) 



                                         Monocytes [2.0-12.0   



                                         %]   

 

                                        0.1 % 

                    (6/21/15 4:00 AM)                          



                                         Eosinophils [0.0-4.0   



                                         %]   

 

                                        0.1 % 

                          (6/21/15 4:00 AM)         0.1 % 

                          (6/20/15 5:06 AM)         0.1 % 

                                        (6/18/15 9:59 PM) 



                                         Basophils [0.0-1.0   



                                         %]   

 

                                        12.7 K/CMM 

*HI*

                          (6/21/15 4:00 AM)         22.3 K/CMM 

*HI*

                          (6/20/15 5:06 AM)         23.1 K/CMM 

*HI*

                                        (6/19/15 5:11 AM) 



                                         Segs-Bands #   



                                         [1.5-8.1 K/CMM]   

 

                                        1.1 K/CMM 

                          (6/21/15 4:00 AM)         0.8 K/CMM 

*LOW*

                          (6/20/15 5:06 AM)         0.5 K/CMM 

*LOW*

                                        (6/19/15 5:11 AM) 



                                         Lymphocytes #   



                                         [1.0-5.5 K/CMM]   

 

                                        1.3 K/CMM 

*HI*

                          (6/21/15 4:00 AM)         1.4 K/CMM 

*HI*

                          (6/20/15 5:06 AM)         0.7 K/CMM 

                                        (6/19/15 5:11 AM) 



                                         Monocytes # [0.0-0.8   



                                         K/CMM]   

 

                                        Normal 

                    (6/19/15 5:11 AM)                          



                                         RBC Morph   

 

                                        Normal 

                    (6/19/15 5:11 AM)                          



                                         Plt Morph   

 

                                        16.7 seconds 

*HI*

                    (6/18/15 9:59 PM)                          



                                         PT [12.0-14.7   



                                         seconds]   

 

                                        1.34 9

*HI*

                    (6/18/15 9:59 PM)                          



                                         INR [0.85-1.17]   

 

                                        32.0 seconds 10

                    (6/18/15 9:59 PM)                          



                                         PTT [22.9-35.8   



                                         seconds]   







9Interpretive Data: RECOMMENDED RANGES FOR PROTIME INR:

   2.0-3.0 for most medical and surgical thromboembolic states.

   2.5-3.5 for artificial heart valves and recurrent embolism.



INR SHOULD BE USED ONLY FOR PATIENTS ON STABLE ANTICOAGULANT THERAPY.



10Interpretive Data: Heparin Therapeutic Range:  57 - 92 Seconds



Immunizations







  



                     Vaccine             Date                Refusal Reason

 

  



                     pneumococcal 23-valent vaccine     6/21/15             Patient Refuses







Procedures







   



                 Procedure       Date            Related Diagnosis     Body Site

 

   



                                         Procedure on knee   

 

   



                                         Tonsillectomy   







Social History







 



                           Social History Type       Response

 

 



                           Alcohol                   Never

 

 



                           Smoking Status            Former smoker; Exposure to Tobacco Smoke None; Cigarette Smoking

 Last 365



                                         Days No; Reg Smoking Cessation Counseling No







Assessment and Plan

Extracted from:





  



                     Title: Clinical Document     Author: Kashmir Soto DO     Date: 6/20/15









                                        Progress Daily

[facility]



SUBJECTIVE



Pt is feeling well with no more SOB and ambulating well with no acute complaint.
denies fever or chills





OBJECTIVE





Vital Signs (last 24 hrs)_____Last Charted___________

Temp Oral98.4 DegF  ( 07:58)

Heart Rate [id number] bpm  ( 07:58)

Resp Rate    17 BRMIN  ( 07:58)

SBPH 160mmHg  ( 07:58)

DBPH 94mmHg  ( 07:58)





Labs (Last four charted values)

WBC                 H 24.6( 20)H 24.3(TESSY 19)H 24.3( 18)

Hgb                 14.6(TESSY 20)15.5(TESSY 19)15.7(TESSY 18)

Hct                 43.1(TESSY 20)45.9(TESSY 19)46.0(TESSY 18)

Plt                 201(TESSY 20)196(TESSY 19)215(TESSY 18)

Na                  138(TESSY 20)137(TESSY 19)137(TESSY 18)

K                   4.8(TESSY 20)4.6(TESSY 19)4.3(TESSY 18)

CO2                 25(TESSY 20)27(TESSY 19)L 22(TESSY 18)

Cl                  104()103()104()

Cr                  1.4()1.4()H 1.5()

BUN                 H 46()H 24()22()

Glucose Random      H 318()H 261()H 236()

Ca                  9.0()9.5()9.3()

PT                  H 16.7()

INR                 H 1.34()

PTT                 32.0()

Troponin            0.04()0.05()0.05()

CK MB               H 6.8()H 4.1()2.8()

Total CK            H 1494()H 1805()H 2036()H 1914()



Input/Output

RecordInOutBal

                                        1924hr Tot  258    0  258

                                        1824hr Tot   58    0   58





ASSESSMENT & EXAM

Gen. Pt is AOX4 in no acute distress

HEET:  Normocephalic, nontraumatic.

NECK:  Supple, no JVD or Lymphadenopathy

LUNGS:  Clear on auscultation B/l with no wheezing or crackles

HEART:  S1, S2.RRR with no murmurs

ABDOMEN:  Soft, NT/ND with good BS

CENTRAL NERVOUS SYSTEM:  Patient moving all extremities grossly well.

LOWER EXTREMITIES: No C/C/E







PLAN & TREATMENT

Pt admitted with acute bronchitis and noted with Rhabdomyolysis with LISA

                                        -C/w IVF

                                        -Ck remains very elevated

                                        -Will D/c crestor which might be causing rabdo

                                        -C/w ABX and Neb







DIAGNOSES & PROBLEMS

Acute Bronchitis

Rhabdomyolysis

LISA

CAD

DM

Obesity

HTN







Scheduled Meds (8):amLODIPine, aspirin, azithromycin, clopidogrel (Plavix), 
insulin aspart (NovoLOG FlexPen), insulin aspart (NovoLOG FlexPen), insulin 
aspart (NovoLOG FlexPen), insulin detemir (Levemir FlexPen)

Unscheduled Meds: None

PRN Meds (13):Dextrose 50% in Water IV (Dextrose 50% Syringe), Dextrose 50% in 
Water IV (Dextrose 50% Syringe), atropine, glucagon, insulin aspart, insulin 
aspart, insulin aspart, insulin aspart, insulin aspart, ipratropium, 
levalbuterol (Xopenex), nitroglycerin (nitroglycerin 0.4 mg sublingual tablet), 
sodium chloride (Saline Flush 0.9%)

One Time Meds (3):(Ordered) Sodium Chloride 0.9% IV (NS (Bolus) IV), (not done) 
azithromycin (Zithromax), (Completed) methylPREDNISolone (methylPREDNISolone 
SODium SUCCinate)

Continuous Infusions (1):Sodium Chloride 0.9% IV 1,000 mL (normal saline 0.9% IV
1,000 mL)





Extracted from:





  



                     Title: Clinical Document     Author: Benjamin Matute MD     Date: 6/19/15









                                        History and Physical

Attending: Kashmir Soto: [phone]: Internal Medicine

Code status: None Specified=FULL CODE

Reason for Admission: DYSPNEA, ACUTE BRONCHITIS

Working DRG: None Documented

Isolation: None Documented

Consulting Physicians: (none on file)



CC: difficulty breathing



HPI: This is a 70 yo w/ below PMHx who p/w few day h/o SOB. SOB occurs at rest 
and on exertion, makes no difference. Has cough w/ whitish sputum for a while. 
Decreased energy. Has not had these symptoms before. Denies CP, 
lightheadedness/dizziness, abd pain, N/V, bowel/urinary changes, fevers. No sick
contacts. In the ER, noted to be hypoxic to upper 80s% on RA during ambulation.



PMHx:

T2DM

CAD

HTN

HLD



PSHx:

Tonsillectomy

cardiac stent



FHx:

reviewed and noncontributory



SHx:

Former heavy drinker, quit 30yrs ago

Denies tobacco, illicit drugs

Exposed to second hand smoker when younger but not recently



Meds:

 Medication List

   Active Medications

       Ordered

           albuterol-ipratropium: 3 ml, NEB, PRN, PRN: Respiratory Protocol.

           albuterol-ipratropium: 3 ml, NEB, PRN, PRN: Respiratory Protocol.

           azithromycin: 500 mg, 250 mL, 166.67 ml/hr, IVPB, ONCE.

           sodium chloride: 10 mL, IVP, PRN, PRN: Line Flush.

   Medications Inactivated in the Last 72 Hours

       iodixanol: 100 mL, PYXIS, ONCE.

       methylPREDNISolone: 125 mg, 2 mL, IVP, ONCE.

       methylPREDNISolone: 125 mg, 2 mL, PYXIS, ONCE.

       Sodium Chloride 0.9% IV: 100 mL, PYXIS, ONCE.





Allergies: NKDA





ROS: See HPI.

         All other systems reviewed by myself are negative unless noted above.





Physical Exam:

VitalsTmp(F)[id number]

                                         01:42------------2297 3.0L/m

                                         21:[id number]---





                                        24 Hr Tmax: 98.0F (36.67c) at  21:48Vital Signs are the last 5 in the past 

48 hours.





General: NAD, nontoxic appearing

HEENT: NCAT, PERRL, MMM, no JVD

Cardiovascular: RRR, S1S2

Respiratory: scattered expiratory wheezes

Abdomen: +BS, NT/ND

Extremities: no b/l LE edema

Skin: no rashes

Neurologic: comprehension and speech intact, CN III-XII grossly intact

Musculoskeletal: symmetric strength in all extremities



Labs:

                                        24hr Labs

                                         2159

Sodium Oln693  

Potassium Lvl4.3  

Chloride Mbj126  

  L

AGAP15.3  

Glucose Mqw672  H

Creatinine Lvl1.5  H

BUN22  

B/C Ratio15  

Total Protein7.1  

Albumin Lvl3.4  L

Globulin3.7  

A/G Ratio0.9  

Calcium Lvl9.3  

ALT28  

AST32  

Alk Phos56  

Bili Total1.7  H

eGFR47  

Troponin-I0.05  

CK MB2.8  

Total KB1308  H

CK MB Index0.3  

AFW659  H

WBC24.3  H

RBC4.92  

Hgb15.7  

Hct46.0  

MCV93.5  

MCH31.8  H

MCHC34.0  

RDW15.7  H

Bpbltvgg844  

MPV7.7  

Segs88.5  H

Monocytes8.4  

Lymphocytes3.0  L

Basophils0.1  

Segs-Bands #21.5  H

Lymphocytes #0.7  L

Monocytes #2.0  H

PT16.7  H

INR1.34  H

PTT32.0  





Micro:

none



Imaging:

CXR: The hypoinflated lungs are clear of consolidation, pleural effusion, and 
pneumothorax. Mild bilateral basilar subsegmental atelectasis. Mildly elevated 
right hemidiaphragm. The heart size is at the upper limits of normal to mildly 
enlarged.



CT chest: 1. Suboptimal contrast opacification of the peripheral pulmonary 
arteries. No central pulmonary artery embolism is seen.

                                        2. Respiratory motion artifact limits evaluation of the lungs. No definite pneumonia

 or edema. No pleural effusion.

                                        3. Coronary artery calcifications. Mild cardiomegaly.

                                        4. No bulky adenopathy.

                                        5. Spondylosis thoracic spine.



EKG: sinus tachy, no major ST abnormalities



Assessment and Plan: 70 yo p/w SOB and cough. No prior h/o COPD/asthma or other 
pulmonary issues. Pt is admitted because of persistent hypoxia.

CK 1000, 



Pt was hypoxic on RA so requiring NC. No PE or pnuemonia found on imaging. Has 
leukocytosis w/ elevated segs but no source of bacterial infection found. Has 
 but no fluid seen on chest imaging, no JVD.



# acute bronchitis: xopenex, ipratropium, steroids, azithromycin, oxygen



# LISA: iv fluids and recheck



# elevated CK but no myalgias or other associated symptoms: iv fluids, reheck CK



# DM: SSI



# CAD/HTN: c/w home regimen



Prophylaxis: ambulation

Diet: diabetic



Benjamin Matute

[place]

## 2018-12-14 VITALS — SYSTOLIC BLOOD PRESSURE: 110 MMHG | DIASTOLIC BLOOD PRESSURE: 69 MMHG

## 2018-12-14 VITALS — DIASTOLIC BLOOD PRESSURE: 69 MMHG | SYSTOLIC BLOOD PRESSURE: 110 MMHG

## 2018-12-14 LAB
ANION GAP SERPL CALC-SCNC: 15.5 MMOL/L (ref 8–16)
ANISOCYTOSIS BLD QL SMEAR: SLIGHT
BASE EXCESS BLDA CALC-SCNC: 1 MMOL/L (ref -2–3)
BASE EXCESS BLDA CALC-SCNC: 2 MMOL/L (ref -2–3)
BASOPHILS # BLD AUTO: 0.1 10*3/UL (ref 0–0.1)
BASOPHILS NFR BLD AUTO: 0.4 % (ref 0–1)
BUN SERPL-MCNC: 37 MG/DL (ref 7–26)
BUN/CREAT SERPL: 40 (ref 6–25)
CALCIUM SERPL-MCNC: 8.4 MG/DL (ref 8.4–10.2)
CHLORIDE SERPL-SCNC: 106 MMOL/L (ref 98–107)
CK MB SERPL-MCNC: 10.5 NG/ML (ref 0–5)
CK MB SERPL-MCNC: 9.5 NG/ML (ref 0–5)
CK SERPL-CCNC: 497 IU/L (ref 30–200)
CK SERPL-CCNC: 611 IU/L (ref 30–200)
CO2 SERPL-SCNC: 22 MMOL/L (ref 22–29)
DEPRECATED NEUTROPHILS # BLD AUTO: 10.2 10*3/UL (ref 2.1–6.9)
EGFRCR SERPLBLD CKD-EPI 2021: > 60 ML/MIN (ref 60–?)
EOSINOPHIL # BLD AUTO: 0 10*3/UL (ref 0–0.4)
EOSINOPHIL NFR BLD AUTO: 0 % (ref 0–6)
ERYTHROCYTE [DISTWIDTH] IN CORD BLOOD: 14.2 % (ref 11.7–14.4)
GLUCOSE SERPLBLD-MCNC: 319 MG/DL (ref 74–118)
HCO3 BLDA-SCNC: 27 MMOL/L (ref 23–28)
HCO3 BLDA-SCNC: 29 MMOL/L (ref 23–28)
HCT VFR BLD AUTO: 41.3 % (ref 38.2–49.6)
HGB BLD-MCNC: 13.2 G/DL (ref 14–18)
HYPOCHROMIA BLD QL SMEAR: SLIGHT
LYMPHOCYTES # BLD: 1.1 10*3/UL (ref 1–3.2)
LYMPHOCYTES NFR BLD AUTO: 8.5 % (ref 18–39.1)
LYMPHOCYTES NFR BLD MANUAL: 7 % (ref 19–48)
MCH RBC QN AUTO: 32.8 PG (ref 28–32)
MCHC RBC AUTO-ENTMCNC: 32 G/DL (ref 31–35)
MCV RBC AUTO: 102.5 FL (ref 81–99)
MONOCYTES # BLD AUTO: 1 10*3/UL (ref 0.2–0.8)
MONOCYTES NFR BLD AUTO: 8.1 % (ref 4.4–11.3)
MONOCYTES NFR BLD MANUAL: 7 % (ref 3.4–9)
MYELOCYTES NFR BLD MANUAL: 1 % (ref 0–0)
NEUTS SEG NFR BLD AUTO: 80.3 % (ref 38.7–80)
NEUTS SEG NFR BLD MANUAL: 82 % (ref 40–74)
NRBC/RBC NFR BLD MANUAL: 2 %
PCO2 BLDA: 114 MMHG (ref 80–105)
PCO2 BLDA: 54 MMHG (ref 41–51)
PCO2 BLDA: 60 MMHG (ref 41–51)
PCO2 BLDA: 89 MMHG (ref 80–105)
PH BLDA: 7.29 [PH] (ref 7.31–7.41)
PH BLDA: 7.31 [PH] (ref 7.31–7.41)
PLAT MORPH BLD: NORMAL
PLATELET # BLD AUTO: 312 X10E3/UL (ref 140–360)
PLATELET # BLD EST: ADEQUATE 10*3/UL
POTASSIUM SERPL-SCNC: 4.5 MMOL/L (ref 3.5–5.1)
RBC # BLD AUTO: 4.03 X10E6/UL (ref 4.3–5.7)
RBC MORPH BLD: NORMAL
SAO2 % BLDA: 95 % (ref 95–98)
SAO2 % BLDA: 98 % (ref 95–98)
SODIUM SERPL-SCNC: 139 MMOL/L (ref 136–145)

## 2018-12-14 RX ADMIN — TAMSULOSIN HYDROCHLORIDE SCH MG: 0.4 CAPSULE ORAL at 10:50

## 2018-12-14 RX ADMIN — SODIUM CHLORIDE SCH MLS/HR: 9 INJECTION, SOLUTION INTRAVENOUS at 00:17

## 2018-12-14 RX ADMIN — INSULIN LISPRO SCH UNIT: 100 INJECTION, SOLUTION INTRAVENOUS; SUBCUTANEOUS at 22:09

## 2018-12-14 RX ADMIN — APIXABAN SCH MG: 5 TABLET, FILM COATED ORAL at 10:36

## 2018-12-14 RX ADMIN — APIXABAN SCH MG: 5 TABLET, FILM COATED ORAL at 17:00

## 2018-12-14 RX ADMIN — SODIUM CHLORIDE SCH MLS/HR: 9 INJECTION, SOLUTION INTRAVENOUS at 11:20

## 2018-12-14 RX ADMIN — VANCOMYCIN HYDROCHLORIDE SCH MLS/HR: 1 INJECTION, SOLUTION INTRAVENOUS at 14:04

## 2018-12-14 RX ADMIN — SODIUM CHLORIDE SCH MLS/HR: 9 INJECTION, SOLUTION INTRAVENOUS at 18:30

## 2018-12-14 RX ADMIN — METOPROLOL SUCCINATE SCH MG: 50 TABLET, EXTENDED RELEASE ORAL at 10:36

## 2018-12-14 RX ADMIN — CEFEPIME HYDROCHLORIDE SCH MLS/HR: 1 INJECTION, POWDER, FOR SOLUTION INTRAMUSCULAR; INTRAVENOUS at 02:50

## 2018-12-14 RX ADMIN — CEFEPIME HYDROCHLORIDE SCH MLS/HR: 1 INJECTION, POWDER, FOR SOLUTION INTRAMUSCULAR; INTRAVENOUS at 12:27

## 2018-12-14 NOTE — NUR
PATIENT AWAKE AND ALERT SITTING IN BED, RESP EVEN AND UNLABORED. SKIN WARM AND 
DRY. NO SIGNS OF ACUTE DISTRESS NOTED AT THIS TIME. PATIENT REFUSING TO WEAR 
BIPAP AT THIS TIME, STATES "I NEED A BREAK". N/C 4L IN PLACE, TOLERATING WELL. 
INSTRUCTED TO NOTIFY NURSE FOR C/O SOB OR DIFFICULTY BREATHING, VERBALIZED 
UNDERSTANDING.

## 2018-12-14 NOTE — NUR
ASSUMED CARE AT THIS TIME. PATIENT LAYING IN BED WITH EYES CLOSED. SKIN WARM 
AND DRY. RESP EVEN AND UNLABORED. NO SIGNS OF ACUTE DISTRESS NOTED AT THIS 
TIME. BIPAP IN PLACE, TOLERATING WELL.

## 2018-12-14 NOTE — HISTORY AND PHYSICAL
REASONS FOR ADMISSION

1. Respiratory failure. 

2. Sepsis. 



HISTORY OF PRESENT ILLNESS:  Patient is _______, who presented with acute 

respiratory distress, who was noted to have evidence of possible sepsis 

with an elevated white count.  Patient was placed on BiPAP, as well as IV 

antibiotics.  He has been admitted for further evaluation.  Currently 

states he is  doing much better. 



PAST MEDICAL HISTORY:  Significant for diabetes, chronic kidney disease 

history. 



MEDICATIONS:  See MAR.



ALLERGIES:  SEE MAR.



SOCIAL:  Nonsmoker and nondrinker.



FAMILY HISTORY:  Noncontributory. 



PHYSICAL EXAM

VITALS:  98.6, blood pressure 126/74, pulse of 110 and irregular, sats are 

94% on BiPAP.

GENERAL:  He is no apparent distress, lying in bed.  _______ around while 

he is on the BiPAP.

NECK:  Supple.  No lymphadenopathy. 

CARDIOVASCULAR:  Irregularly irregular.

LUNGS:  Decreased breath sounds bilaterally.  No wheezing or rhonchi noted.

ABDOMEN:  Good bowel sounds, soft and nontender.

EXTREMITIES:  No clubbing or cyanosis.

NEUROLOGICAL:  Nonfocal.  



ASSESSMENT AND PLAN 

1. Acute respiratory failure.  Will continue bilevel positive airway 

pressure and consult pulmonary. 

2. Sepsis.  Will continue with antibiotics _______.

3. Leukocytosis.  Will continue to monitor white count. 

4. Chronic kidney disease, stage 3.  Continue to monitor. 

5. Diabetes.  Continue with the current care and monitoring.

6. Atrial fibrillation with rapid ventricular response.  Will consult 

cardiology.  Continue with telemetry.



Please see hospital chart for full details. 









DD:  12/14/2018 05:28

DT:  12/14/2018 05:39

Job#:  [id number]

## 2018-12-14 NOTE — NUR
PATIENT AWAKE AND ALERT SITTING IN BED. RESP EVEN AND UNLABORED. SKIN WARM AND 
DRY. NO SIGNS OF ACUTE DISTRESS NOTED AT THIS TIME. STATES, "I FEEL BETTER", 
BIPAP REMOVED, ORAL CARE PROVIDED, TOLERATED WELL. BIPAP MASK READJUSTED AND 
PLACED, TOLERATING WELL.

## 2018-12-14 NOTE — CONSULTATION
DATE OF CONSULTATION:  December 14, 2018 





REASON FOR CONSULTATION:  AFib.



CONSULTING PHYSICIAN:  Dr. Avalos



HPI:  This is a morbidly obese 73-year-old male that presented with 

dyspnea.  According to the patient, for 1 week now he has shortness of 

breath, difficulty to carry out activities of daily living that he decided 

to come into the emergency room for evaluation.  He stated that he was 

treated with some antibiotics, some steroids for bronchitis, but the 

symptoms keep getting worse.  He has a history of diastolic CHF, carotid 

disease in the past.  He denied any chest pain, any palpitation, any 

diaphoresis, or headache.  He stated also lately he has been having 

multiple falls.  Troponin was negative.  EKG showed AFib.  BNP was 338.  He 

was found to be in AFib with RVR, and cardiology was consulted.



PAST MEDICAL HISTORY:  Hypertension, diabetes, diastolic CHF, carotid 

disease, obesity, and falls.



PAST SURGICAL HISTORY:  Bilateral carotid stenting.



FAMILY HISTORY:  Positive for CAD.



SOCIAL HISTORY:  No smoking, no drinking.  He lives at home with the wife.



MEDICATIONS:  He was on Lasix, insulin, metformin, losartan, metoprolol, 

simvastatin, Eliquis, and tamsulosin.



ALLERGIES:  HE IS NOT ALLERGIC TO ANY MEDICATION.



REVIEW OF THE SYSTEMS:  He was positive for shortness of breath and 

dyspnea.



PHYSICAL EXAMINATION:

VITAL SIGNS:  Temperature 97, heart rate 102, blood pressure 125/72, 

respirations 23, on BiPAP.

GENERAL:  He is awake, alert, and oriented x3, but very lethargic.

HEENT:  Mucous membranes moist.

NECK:  Supple.

LUNGS:  Bilaterally with decreased breath sounds.

CARDIOVASCULAR:  Irregularly irregular.

ABDOMEN:  Soft.

NEUROLOGICAL:  He is able to move all extremities.

EXTREMITIES:  Bilateral lower extremities with no edema.



LABS:  Sodium 139, potassium 4.5, chloride 106, CO2 22, BUN 37, creatinine 

0.92, glucose 319.  , CK-MB 10.50, and troponin was negative.



IMPRESSION:

1. Atrial fibrillation.

2. Chronic diastolic heart failure.

3. Hypertension.

4. Diabetes.

5. Obesity.

6. Respiratory distress.

7. Elevated liver function tests.

8. Elevated CK and CK-MB.

9. History of carotid disease and stenting.

10. History of fall.



ASSESSMENT AND PLAN:  Will go ahead and continue IV diuresis.  He had an 

echocardiogram done in March 2018 that showed normal systolic function, EF 

50 to 55%.  Will continue beta blocker.  He was on Eliquis, will go ahead 

and resume the same.  Will check his liver function tests, and he is on 

BiPAP, will continue the same.  Due to the elevated liver function tests, 

will go ahead and hold the statin.



Further cardiac workup pending clinical course.



Thank you for this consultation.





Dictated by: Chana Salamanca NP







DD:  12/14/2018 08:24

DT:  12/14/2018 09:44

Job#:  [id number]

## 2018-12-14 NOTE — NUR
WALKING ROUNDS WITH DR BONILLA, INFORMED HIM OF THIS SHIFTS ABG OCCURENCE AND 
BIPAP ADJUSTEMENTS MADE IN THE ED.

## 2018-12-14 NOTE — NUR
BIPAP REMOVED FOR PATIENT TO EAT BREAKFAST TRAY, N/C 4L APPLIED, TOLERATING 
WELL. INSTRUCTED PATIENT TO NOTIFY NURSE FOR SOB OR DIFFICULTY BREATHING, 
VERBALIZED UNDERSTANDNG. NO SIGNS OF ACUTE DISTRESS NOTED AT THIS TIME.

## 2018-12-14 NOTE — NUR
PT DOES NOT WANT TO PUT HIS BIPAP MASK BACK ON STATING "I NEED A BREAK"  - PT 
CURRENTLY 98% RA, SITTING ON EDGE OF BED WITH NAD NOTED; WILL CONTINUE TO 
MONITOR - PT INSTRUCTED TO USE CALL LIGHT FOR ASSISTANCE

## 2018-12-14 NOTE — CONSULTATION
DATE OF CONSULTATION:  December 14, 2018 



PULMONARY CONSULTATION



REASON FOR THE CONSULT:  Shortness of breath.



HISTORY OF PRESENT ILLNESS:  Mr. Bryant is a 73-year-old male who presented 

to the emergency room with worsening shortness of breath. Patient reports 

that for 3 to 4 days he was having cough productive of greenish phlegm. He 

felt that he was getting cold. He denies any chest pain, nausea, vomiting. 

He went to his primary care physician where he was prescribed prednisone 

and possibly antibiotic. He had an echocardiogram done here last time which 

showed EF of 55% with LVH, left atrium dilatation. He had a CT scan done in 

the emergency room which showed evidence of pneumonia and did not show any 

PE. He denies any chest pain, nausea, vomiting or diarrhea. He has been 

having cough and shortness of breath. He is a lifelong nonsmoker.



REVIEW OF SYSTEMS

GENERAL:  Denies any fever or chills.

HEAD:  Denies any head trauma.

ENT:  Denies any earache.

CVS:  Denies any chest pain.

RESPIRATORY:  Shortness of breath.

GI:  Denies any nausea or vomiting. 

REMAINDER:  The rest of the review of systems are negative except as in the 

HPI.



PAST MEDICAL HISTORY

1. Hypertension.

2. Hyperlipidemia.

3. Chronic kidney disease.

4. Diastolic heart failure.

5. Previous echo here shows evidence of diastolic heart failure with LA 

dilated.

6. History of coronary artery disease, cardiac cath in the past. His 

cardiologist is Dr. Francis.



PAST SURGICAL HISTORY

1. Knee replacement.

1. Tonsillectomy.



FAMILY AND SOCIAL HISTORY:  He lives by himself. He is a retired insurance 

. Never smoked in his life. He denies any alcohol use. Family 

history is significant for hypertension and heart disease.



PHYSICAL EXAMINATION

VITAL SIGNS:  Temperature 98.6, pulse of 104, blood pressure 105/84, 

respiratory rate of 18, O2 sat 99%.

HEENT:  Head atraumatic, normocephalic. 

NECK:  Supple.

CHEST:  Clear to auscultation bilaterally. No wheezing. His air entry is 

reduced. Crackles bilaterally in the upper lobe.

HEART:  S1/S2 audible. 

ABDOMEN:  Soft, nontender, nondistended. 

EXTREMITIES:  No pedal edema.

NEUROLOGICALLY:  He is awake and alert.



LABS:  White count of 17,000 has come down to 12.75. Hemoglobin 15.5. 

Platelets 380.



CHEMISTRY:  Sodium 139, potassium 4.5, chloride 106, BUN 37, creatinine 

0.92. Creatine kinase 657, CK-MB was 9.50, troponin negative. .7. 

His liver function tests showed AST of 45, ALT of 60, alk phos of 83. 

Lactic acid was 21 when he came in.



CT of the chest:  I have reviewed the images showing evidence of pneumonia 

in the upper lobes.



Serum Gram stain has been sent and is pending. Urine is also pending. 

Creatinine 1.28, BUN 43. Creatinine was 0.92 in March when he was 

discharged and today again is 0.92. He came in with this creatinine. He 

received IV hydration. It has normalized.



ASSESSMENT:  Mr. Bryant is a 73-year-old male who presented with increasing 

cough, phlegm, leukocytosis. Failed outpatient treatment for pneumonia. He 

received Zithromax and prednisone by his primary care physician.



CURRENT PROBLEMS

1. Sepsis due to pneumonia which was present on admission. 

2. Respiratory failure.

3. High likelihood of obstructive sleep apnea.

4. Hypertension.

5. Hyperlipidemia.

6. History of coronary artery disease.

7. History of chronic diastolic heart failure.

8. Elevated CK-MB and CK with liver function test abnormality could be 

due to medications.



PLAN

1. Agree with IV vancomycin and cefepime that has been ordered.

2. BiPAP use as needed.

3. Continue home medications with apixaban, metoprolol. Patient is on 

anticoagulation.

4. Cardiology consult reviewed.





Critical care time spent 40 minutes.

 





DD:  12/14/2018 15:33

DT:  12/14/2018 16:06

Job#:  [id number] MARY

## 2018-12-15 VITALS — DIASTOLIC BLOOD PRESSURE: 56 MMHG | SYSTOLIC BLOOD PRESSURE: 135 MMHG

## 2018-12-15 VITALS — DIASTOLIC BLOOD PRESSURE: 65 MMHG | SYSTOLIC BLOOD PRESSURE: 125 MMHG

## 2018-12-15 VITALS — SYSTOLIC BLOOD PRESSURE: 107 MMHG | DIASTOLIC BLOOD PRESSURE: 61 MMHG

## 2018-12-15 VITALS — SYSTOLIC BLOOD PRESSURE: 164 MMHG | DIASTOLIC BLOOD PRESSURE: 82 MMHG

## 2018-12-15 VITALS — SYSTOLIC BLOOD PRESSURE: 119 MMHG | DIASTOLIC BLOOD PRESSURE: 80 MMHG

## 2018-12-15 VITALS — DIASTOLIC BLOOD PRESSURE: 96 MMHG | SYSTOLIC BLOOD PRESSURE: 164 MMHG

## 2018-12-15 VITALS — DIASTOLIC BLOOD PRESSURE: 78 MMHG | SYSTOLIC BLOOD PRESSURE: 144 MMHG

## 2018-12-15 VITALS — SYSTOLIC BLOOD PRESSURE: 164 MMHG | DIASTOLIC BLOOD PRESSURE: 96 MMHG

## 2018-12-15 RX ADMIN — TAMSULOSIN HYDROCHLORIDE SCH MG: 0.4 CAPSULE ORAL at 07:50

## 2018-12-15 RX ADMIN — SODIUM CHLORIDE SCH MLS/HR: 9 INJECTION, SOLUTION INTRAVENOUS at 04:21

## 2018-12-15 RX ADMIN — CEFEPIME HYDROCHLORIDE SCH MLS/HR: 1 INJECTION, POWDER, FOR SOLUTION INTRAMUSCULAR; INTRAVENOUS at 01:50

## 2018-12-15 RX ADMIN — APIXABAN SCH MG: 5 TABLET, FILM COATED ORAL at 07:50

## 2018-12-15 RX ADMIN — METFORMIN HYDROCHLORIDE SCH MG: 500 TABLET, FILM COATED ORAL at 16:50

## 2018-12-15 RX ADMIN — VANCOMYCIN HYDROCHLORIDE SCH MLS/HR: 1 INJECTION, SOLUTION INTRAVENOUS at 13:06

## 2018-12-15 RX ADMIN — HYDRALAZINE HYDROCHLORIDE PRN MG: 20 INJECTION INTRAMUSCULAR; INTRAVENOUS at 20:48

## 2018-12-15 RX ADMIN — APIXABAN SCH MG: 5 TABLET, FILM COATED ORAL at 16:50

## 2018-12-15 RX ADMIN — METOPROLOL SUCCINATE SCH MG: 50 TABLET, EXTENDED RELEASE ORAL at 07:50

## 2018-12-15 RX ADMIN — INSULIN LISPRO SCH UNIT: 100 INJECTION, SOLUTION INTRAVENOUS; SUBCUTANEOUS at 20:48

## 2018-12-15 RX ADMIN — METFORMIN HYDROCHLORIDE SCH MG: 500 TABLET, FILM COATED ORAL at 07:50

## 2018-12-15 RX ADMIN — SODIUM CHLORIDE SCH MLS/HR: 9 INJECTION, SOLUTION INTRAVENOUS at 15:08

## 2018-12-15 RX ADMIN — INSULIN LISPRO SCH UNIT: 100 INJECTION, SOLUTION INTRAVENOUS; SUBCUTANEOUS at 07:52

## 2018-12-15 RX ADMIN — INSULIN LISPRO SCH UNIT: 100 INJECTION, SOLUTION INTRAVENOUS; SUBCUTANEOUS at 16:50

## 2018-12-15 RX ADMIN — CEFEPIME HYDROCHLORIDE SCH MLS/HR: 1 INJECTION, POWDER, FOR SOLUTION INTRAMUSCULAR; INTRAVENOUS at 13:06

## 2018-12-15 RX ADMIN — SODIUM CHLORIDE SCH MLS/HR: 9 INJECTION, SOLUTION INTRAVENOUS at 23:24

## 2018-12-15 RX ADMIN — INSULIN LISPRO SCH UNIT: 100 INJECTION, SOLUTION INTRAVENOUS; SUBCUTANEOUS at 12:36

## 2018-12-15 NOTE — NUR
Report received from AM nurse Mercedes. Patient received sitting at edge of the bed. Denied 
pain and no SOB. No respiratory distress noted, continued on 3liters oxygen via nasal 
canula/ BIPAP PRN. Bed in lower position,locked. Call bell within reach. Will continue to 
monitor.

## 2018-12-15 NOTE — PROGRESS NOTE
DATE:  December 15, 2018 



COVERING FOR:  Dr. Avalos. 



SUBJECTIVE:  Patient comes in with congestive heart failure and also 

bronchitis.  Patient is currently doing better.  Shortness of breath is 

much better.  No chest pains.  No orthopnea.  No PND.  Slight exertional 

dyspnea is noted. 



OBJECTIVE 

VITAL SIGNS:  Temperature is 98.6, pulse of 75, blood pressure 144/78, and 

pulse ox is 98%. 

GENERAL:  The patient is alert and oriented x3. 

HEENT:  Normocephalic, atraumatic.  Patient has O2 support, was on BiPAP, 

currently on O2 via nasal cannula. 

CVS:  S1 and S2 normal.  Regular rate and rhythm.  Positive for an S3. 

LUNGS:  Good air entry.  Positive for some minimal inspiratory wheezes 

present. 

ABDOMEN:  Soft, nontender, and protuberant. 

EXTREMITIES:  Trace edema present. 



LABORATORY DATA:  Patient's last was yesterday, white count of 12,000, 

which is trending down from 17,000, neutrophil count 80.3.  Coags were 

normal.  INR is 1.76.  Chemistries, sodium of 139, potassium is 4.5, 

glucose is 319.  Creatinine kinase was elevated at 497, troponin has always 

trended down, and CK-MB was 9.50. 



MICROBIOLOGY:  Sputum culture is pending.  Urine culture preliminary was no 

growth after the 48 hours.  Blood cultures, no growth after 24 hours for 2 

of them. 



IMAGING STUDIES:  Patient's echocardiogram shows an EF of 50%-55% with mild 

concentric left ventricular hypertrophy, left atrium mild dilatation. 



ASSESSMENT 

1. Sepsis due to pneumonia, which we will continue on antibiotics.  The 

patient is on Cefepime.

2. Respiratory failure, on BiPAP and also on oxygen support.  Patient 

has a very high likelihood of sleep apnea with morbid obesity.  We will 

continue monitoring it and possibly need a CPAP evaluation as an 

outpatient. 

3. Hypertension:  Antihypertensive will be continued. 

4. Hyperlipidemia:  We will continue on his antihyperlipidemic agents. 

5. Patient has a history of coronary artery disease:  Follow up with Dr. Chi on an outpatient basis.  Patient can continue doing that.  IV 

diuresis and also cardiology representation in-house by Dr. Chi.  

Further recommendations per clinical course.  We will continue to 

diurese the patient.  A GI consult has also been done.  For possible 

aspiration, patient is to have an MBS to be done on Monday.  Further 

recommendations per clinical course.  Patient's medications at this time 

are Cefepime, vancomycin, metoprolol 100 mg, apixaban 5 mg twice a day 

(Eliquis), metformin 500 mg twice a day, hydralazine 10 mg q.6h. p.r.n., 

and _____ as needed.  Patient is also diabetic and is currently on an 

insulin sliding scale.  We will also restart his home medications.  For 

further information, look in the chart. 









DD:  12/15/2018 06:21

DT:  12/15/2018 07:07

Job#:  [id number] NIKOLAY

## 2018-12-15 NOTE — NUR
Nutrition Screen Note



RD Recommendation for Physician: Continue diet as ordered 



Plan of Care: RD following, monitoring for adequacy and tolerance



Nutrition reason for involvement:

 Nutrition Risk Trigger MST score



Primary Diagnose(s): acute respiratory failure



Ht:69 in 

Wt:lbs

BMI:39.2 kg/m2

IBW:145lbs



RD Assessment:12/15/2018

Initial encounter with patient. Pt denies having any nausea, vomiting or diarrhea, nor has 
any difficulty chewing or swallowing. Pt reports a good appetite. Pt denies any wt changes

Current Diet: Cardiac diet 



Malnutrition Evaluation (12/15/2018)

The patient does not meet criteria for a specified degree of malnutrition at this time. Will 
re-evaluate at follow-up as appropriate. 









Diet Education Needs Assessment:

Diet education not indicated.







Diet Adequacy:

Meeting calorie needs, Meeting protein needs



Tolerance:  

Tolerating PO





Nutrition Care Level: Vaughn Gonzalez RD, LD, CNSC

## 2018-12-16 VITALS — SYSTOLIC BLOOD PRESSURE: 144 MMHG | DIASTOLIC BLOOD PRESSURE: 88 MMHG

## 2018-12-16 VITALS — DIASTOLIC BLOOD PRESSURE: 41 MMHG | SYSTOLIC BLOOD PRESSURE: 81 MMHG

## 2018-12-16 VITALS — SYSTOLIC BLOOD PRESSURE: 160 MMHG | DIASTOLIC BLOOD PRESSURE: 90 MMHG

## 2018-12-16 VITALS — DIASTOLIC BLOOD PRESSURE: 90 MMHG | SYSTOLIC BLOOD PRESSURE: 160 MMHG

## 2018-12-16 VITALS — DIASTOLIC BLOOD PRESSURE: 72 MMHG | SYSTOLIC BLOOD PRESSURE: 151 MMHG

## 2018-12-16 VITALS — DIASTOLIC BLOOD PRESSURE: 80 MMHG | SYSTOLIC BLOOD PRESSURE: 170 MMHG

## 2018-12-16 VITALS — SYSTOLIC BLOOD PRESSURE: 107 MMHG | DIASTOLIC BLOOD PRESSURE: 62 MMHG

## 2018-12-16 LAB
ANION GAP SERPL CALC-SCNC: 13.5 MMOL/L (ref 8–16)
BASOPHILS # BLD AUTO: 0.1 10*3/UL (ref 0–0.1)
BASOPHILS NFR BLD AUTO: 0.4 % (ref 0–1)
BUN SERPL-MCNC: 23 MG/DL (ref 7–26)
BUN/CREAT SERPL: 29 (ref 6–25)
CALCIUM SERPL-MCNC: 8.7 MG/DL (ref 8.4–10.2)
CHLORIDE SERPL-SCNC: 108 MMOL/L (ref 98–107)
CO2 SERPL-SCNC: 26 MMOL/L (ref 22–29)
DEPRECATED FTI SERPL-MCNC: 3.97 UG/DL (ref 1.4–3.8)
DEPRECATED NEUTROPHILS # BLD AUTO: 10.9 10*3/UL (ref 2.1–6.9)
EGFRCR SERPLBLD CKD-EPI 2021: > 60 ML/MIN (ref 60–?)
EOSINOPHIL # BLD AUTO: 0.1 10*3/UL (ref 0–0.4)
EOSINOPHIL NFR BLD AUTO: 0.4 % (ref 0–6)
ERYTHROCYTE [DISTWIDTH] IN CORD BLOOD: 14 % (ref 11.7–14.4)
GLUCOSE SERPLBLD-MCNC: 185 MG/DL (ref 74–118)
HCT VFR BLD AUTO: 42.9 % (ref 38.2–49.6)
HGB BLD-MCNC: 13.6 G/DL (ref 14–18)
LYMPHOCYTES # BLD: 1.6 10*3/UL (ref 1–3.2)
LYMPHOCYTES NFR BLD AUTO: 11 % (ref 18–39.1)
MCH RBC QN AUTO: 33.1 PG (ref 28–32)
MCHC RBC AUTO-ENTMCNC: 31.7 G/DL (ref 31–35)
MCV RBC AUTO: 104.4 FL (ref 81–99)
MONOCYTES # BLD AUTO: 1.4 10*3/UL (ref 0.2–0.8)
MONOCYTES NFR BLD AUTO: 9.9 % (ref 4.4–11.3)
NEUTS SEG NFR BLD AUTO: 76.9 % (ref 38.7–80)
PLATELET # BLD AUTO: 284 X10E3/UL (ref 140–360)
POTASSIUM SERPL-SCNC: 4.5 MMOL/L (ref 3.5–5.1)
RBC # BLD AUTO: 4.11 X10E6/UL (ref 4.3–5.7)
SODIUM SERPL-SCNC: 143 MMOL/L (ref 136–145)
TSH SERPL DL<=0.005 MIU/L-ACNC: 0.03 UIU/ML (ref 0.35–4.94)

## 2018-12-16 RX ADMIN — CEFEPIME HYDROCHLORIDE SCH MLS/HR: 1 INJECTION, POWDER, FOR SOLUTION INTRAMUSCULAR; INTRAVENOUS at 12:52

## 2018-12-16 RX ADMIN — TAMSULOSIN HYDROCHLORIDE SCH MG: 0.4 CAPSULE ORAL at 08:02

## 2018-12-16 RX ADMIN — CEFEPIME HYDROCHLORIDE SCH MLS/HR: 1 INJECTION, POWDER, FOR SOLUTION INTRAMUSCULAR; INTRAVENOUS at 00:54

## 2018-12-16 RX ADMIN — FUROSEMIDE SCH MG: 10 INJECTION, SOLUTION INTRAMUSCULAR; INTRAVENOUS at 16:09

## 2018-12-16 RX ADMIN — APIXABAN SCH MG: 5 TABLET, FILM COATED ORAL at 08:02

## 2018-12-16 RX ADMIN — HYDRALAZINE HYDROCHLORIDE PRN MG: 20 INJECTION INTRAMUSCULAR; INTRAVENOUS at 05:10

## 2018-12-16 RX ADMIN — INSULIN LISPRO SCH UNIT: 100 INJECTION, SOLUTION INTRAVENOUS; SUBCUTANEOUS at 08:02

## 2018-12-16 RX ADMIN — INSULIN LISPRO SCH UNIT: 100 INJECTION, SOLUTION INTRAVENOUS; SUBCUTANEOUS at 21:56

## 2018-12-16 RX ADMIN — VANCOMYCIN HYDROCHLORIDE SCH MLS/HR: 1 INJECTION, SOLUTION INTRAVENOUS at 13:03

## 2018-12-16 RX ADMIN — METOPROLOL SUCCINATE SCH MG: 50 TABLET, EXTENDED RELEASE ORAL at 07:55

## 2018-12-16 RX ADMIN — METFORMIN HYDROCHLORIDE SCH MG: 500 TABLET, FILM COATED ORAL at 16:09

## 2018-12-16 RX ADMIN — Medication SCH MEQ: at 16:10

## 2018-12-16 RX ADMIN — INSULIN LISPRO SCH UNIT: 100 INJECTION, SOLUTION INTRAVENOUS; SUBCUTANEOUS at 11:49

## 2018-12-16 RX ADMIN — INSULIN LISPRO SCH UNIT: 100 INJECTION, SOLUTION INTRAVENOUS; SUBCUTANEOUS at 16:10

## 2018-12-16 RX ADMIN — APIXABAN SCH MG: 5 TABLET, FILM COATED ORAL at 16:09

## 2018-12-16 RX ADMIN — METFORMIN HYDROCHLORIDE SCH MG: 500 TABLET, FILM COATED ORAL at 08:02

## 2018-12-16 NOTE — PROGRESS NOTE
DATE:  December 16, 2018 



SUBJECTIVE:  Patient is here for congestive heart failure, acute 

respiratory failure, and sepsis.  Patient is currently doing better.  

Shortness of breath is better, still feels weak and tired and is only able 

to walk few steps with the walker.  On O2 support and BiPAP at night time.



OBJECTIVE 

VITAL SIGNS:  Temperature is 97.9, pulse of 84, blood pressure is 160/90, 

and pulse oximetry 90% on 3 liters of oxygen.

HEENT:  Normocephalic, atraumatic.  Pupils are reactive to light and 

accommodation.

CVS:  S1 and S2, regular.

ABDOMEN:  Nontender, nondistended.

EXTREMITIES:  No clubbing.  No cyanosis.  No edema.  Positive for edema in 

the lower extremities.



LABORATORY VALUES:  Today, his white count is 14,000, hemoglobin of 13.6, 

and hematocrit of 42.9.  Chemistries; sodium 143, potassium 4.5, chloride 

108, BUN of 23, and creatinine of 0.78.  Glucose has been running high at 

264.  TSH is 0.031.



MEDICATIONS:  Include;

1. Hydralazine p.r.n.

2. Cefepime 1 g b.i.d.

3. Sodium chloride, patient is on 100 mL per hour, we will go ahead and 

stop that.

4. Lisinopril.

5. Insulin lispro.

6. Metformin 500 mg.

7. Apixaban 5 mg.

8. Vancomycin 1 g.

9. Metoprolol 100 mg.

10. Tamsulosin 0.4 mg.

11. Dextrose 50.

12. Metoprolol 25 as needed.

IMAGING STUDIES:  Chest x-ray from 13th is ground-glass opacities.  No CT 

evidence of pulmonary embolism and questionable esophageal wall thickening.



ASSESSMENT

1. Sepsis secondary to pneumonia.  We will continue on antibiotics, 

patient is on two, cefepime and vancomycin.

2. Respiratory failure, on BiPAP and oxygen support.  We will continue 

with possible home O2 evaluation.

3. Hypertension.  Antihypertensives.

4. Hyperlipidemia.  Continue with medications.

5. Patient also has coronary artery disease.  Patient is on Eliquis.  We 

will continue with Eliquis.

6. Patient also has an esophageal thickening, probably need an 

outpatient evaluation for this and it has been stressed to the patient.  

Needs physical therapy for conditioning and also his fluids will be 

stopped at this time.



Further recommendations per clinical course.  We will continue monitoring 

the patient along with the consultants. 







DD:  12/16/2018 06:46

DT:  12/16/2018 07:28

Job#:  [id number] NIKOLAY

## 2018-12-16 NOTE — NUR
Report received from AM nurse Mercedes. Patient received sitting on chair at the bedside with 
continuing 3liters oxygen via nasal canula, Spo2 maintained 92%. Denied pain and no SOB. No 
respiratory distress noted. Patient had blisters on bilateral lower legs( metatarsal 
phalangeal site). Patient assisted to sit in bed and elevated legs with helps of pillows. 
Patient assisted to changed gown and put diaper on as patient's wish. Patient stated "I feel 
so good now". Bed in lower position,locked. Call bell within reach. Will continue to 
monitor.

-------------------------------------------------------------------------------

Addendum: 12/17/18 at 0016 by Thais Reddy RN

-------------------------------------------------------------------------------

double charting

## 2018-12-17 VITALS — SYSTOLIC BLOOD PRESSURE: 177 MMHG | DIASTOLIC BLOOD PRESSURE: 92 MMHG

## 2018-12-17 VITALS — DIASTOLIC BLOOD PRESSURE: 71 MMHG | SYSTOLIC BLOOD PRESSURE: 143 MMHG

## 2018-12-17 VITALS — SYSTOLIC BLOOD PRESSURE: 127 MMHG | DIASTOLIC BLOOD PRESSURE: 86 MMHG

## 2018-12-17 VITALS — DIASTOLIC BLOOD PRESSURE: 70 MMHG | SYSTOLIC BLOOD PRESSURE: 140 MMHG

## 2018-12-17 LAB
ANION GAP SERPL CALC-SCNC: 14.6 MMOL/L (ref 8–16)
BUN SERPL-MCNC: 21 MG/DL (ref 7–26)
BUN/CREAT SERPL: 27 (ref 6–25)
CALCIUM SERPL-MCNC: 9 MG/DL (ref 8.4–10.2)
CHLORIDE SERPL-SCNC: 107 MMOL/L (ref 98–107)
CO2 SERPL-SCNC: 26 MMOL/L (ref 22–29)
EGFRCR SERPLBLD CKD-EPI 2021: > 60 ML/MIN (ref 60–?)
FOLATE SERPL-MCNC: 16.4 NG/ML (ref 7–15.4)
GLUCOSE SERPLBLD-MCNC: 190 MG/DL (ref 74–118)
POTASSIUM SERPL-SCNC: 4.6 MMOL/L (ref 3.5–5.1)
SODIUM SERPL-SCNC: 143 MMOL/L (ref 136–145)
VIT B12 BLD-MCNC: 1225 PG/ML (ref 213–816)

## 2018-12-17 RX ADMIN — APIXABAN SCH MG: 5 TABLET, FILM COATED ORAL at 19:15

## 2018-12-17 RX ADMIN — Medication SCH MEQ: at 19:14

## 2018-12-17 RX ADMIN — INSULIN LISPRO SCH UNIT: 100 INJECTION, SOLUTION INTRAVENOUS; SUBCUTANEOUS at 11:30

## 2018-12-17 RX ADMIN — CEFEPIME HYDROCHLORIDE SCH MLS/HR: 1 INJECTION, POWDER, FOR SOLUTION INTRAMUSCULAR; INTRAVENOUS at 13:00

## 2018-12-17 RX ADMIN — FUROSEMIDE SCH MG: 10 INJECTION, SOLUTION INTRAMUSCULAR; INTRAVENOUS at 19:15

## 2018-12-17 RX ADMIN — METFORMIN HYDROCHLORIDE SCH MG: 500 TABLET, FILM COATED ORAL at 07:47

## 2018-12-17 RX ADMIN — CEFEPIME HYDROCHLORIDE SCH MLS/HR: 1 INJECTION, POWDER, FOR SOLUTION INTRAMUSCULAR; INTRAVENOUS at 01:21

## 2018-12-17 RX ADMIN — APIXABAN SCH MG: 5 TABLET, FILM COATED ORAL at 09:00

## 2018-12-17 RX ADMIN — Medication SCH MEQ: at 09:00

## 2018-12-17 RX ADMIN — INSULIN LISPRO SCH UNIT: 100 INJECTION, SOLUTION INTRAVENOUS; SUBCUTANEOUS at 07:30

## 2018-12-17 RX ADMIN — FUROSEMIDE SCH MG: 10 INJECTION, SOLUTION INTRAMUSCULAR; INTRAVENOUS at 13:46

## 2018-12-17 RX ADMIN — VANCOMYCIN HYDROCHLORIDE SCH MLS/HR: 1 INJECTION, SOLUTION INTRAVENOUS at 13:50

## 2018-12-17 RX ADMIN — VANCOMYCIN HYDROCHLORIDE SCH MLS/HR: 1 INJECTION, SOLUTION INTRAVENOUS at 13:46

## 2018-12-17 RX ADMIN — TAMSULOSIN HYDROCHLORIDE SCH MG: 0.4 CAPSULE ORAL at 19:12

## 2018-12-17 RX ADMIN — METFORMIN HYDROCHLORIDE SCH MG: 500 TABLET, FILM COATED ORAL at 17:00

## 2018-12-17 RX ADMIN — METOPROLOL SUCCINATE SCH MG: 50 TABLET, EXTENDED RELEASE ORAL at 19:13

## 2018-12-17 RX ADMIN — INSULIN LISPRO SCH UNIT: 100 INJECTION, SOLUTION INTRAVENOUS; SUBCUTANEOUS at 20:13

## 2018-12-17 RX ADMIN — INSULIN LISPRO SCH UNIT: 100 INJECTION, SOLUTION INTRAVENOUS; SUBCUTANEOUS at 16:30

## 2018-12-17 NOTE — NUR
Patient in bed. AAOx3 ambulates to bathroom with use of walker. Denies pain at this time. O2 
at 2-3L via n/c. IV to left AC dry, intact and patent. Patient frequent urination. +2 edema 
to lower legs. Blisters noted to feet bilaterally.  insulin given as ordered by MD to 
left upper arm. Patient with no noted distress. Continue monitor.

## 2018-12-17 NOTE — NUR
WOUND CARE CONSULTATION:



THIS IS A 73 YEAR OLD MALE PATIENT ADMITTED TO [facility] FOR ACUTE RESPIRATORY FAILURE, 
DYSPNEA, AND SEPSIS.  HEAD TO TOE SKIN ASSESSMENT PERFORMED.  PATIENT HAS BILATERAL LOWER 
EXTRIMITY 3+ PITTING EDEMA; PULSES PALPABLE AND STRONG BILATERALLY.  PATIENT HAS A  RIGHT 
DORSAL FOOT BLISTER MEASURING 7X12.5CM.  PATIENT HAS A LEFT DORSAL FOOT BLISTER MEASURING 
3.4X7.8CM.  PATIENT HAS A BRUISE NOTED TO HIS RIGHT FOREARM.



LABS:

WBC14.13

[id number]



SPUTUM CULTURE (+) YEAST.

URINE AND BLOOD CULTURE BOTH NEGATIVE.



MEDICATIONS:

CEFEPIME

VANCOMYCIN



RECOMMENDED:

-CONTINUE ALTERNATING PRESSURE RELIEF MATTRESS.

-APPLY BILATERAL HEEL PROTECTORS WITH PILLOW SUSPENSION.

-TURN EVERY 2 HOURS AND PRN.

-NURSING TO CONTINUE TO MONITOR BLISTERS TO BILATERAL FEET.



THANK YOU FOR THIS WOUND CARE CONSULT.




-------------------------------------------------------------------------------

Addendum: 12/17/18 at 1438 by Listeh Townsend RN

-------------------------------------------------------------------------------

Amended: Links added.

## 2018-12-17 NOTE — DIAGNOSTIC IMAGING REPORT
PROCEDURE:

BARIUM SWALLOW was performed with Sodium Carbonate and Barium. 

 

OPERATORS:

   

COMPARISON: CT scan of the chest dated 12/13/2018

 

INDICATIONS: Esophageal wall thickening

 

FINDINGS:  The swallowing mechanism was grossly normal. The esophagus 

was normally distensible and the mucosa was within normal limits.  

Esophageal motility was within normal limits. No intrinsic or extrinsic 

abnormality involving the esophagus. No mucosal abnormality or 

intraluminal mass. There is a small hiatal hernia present. No reflux 

identified.

 

The visualized portion of the stomach and proximal small bowel are 

unremarkable.

 

Fluoroscopy time: 1.3 minutes.

Total dose: 54.66 mGy

 

 

IMPRESSION:

 

1. Small hiatal hernia.

2. No esophageal mass. 

 

Chon Chi D.O.  

Dictated by:  Chon Chi D.O. on 12/17/2018 at 17:00     

Electronically approved by:  Chon Chi D.O. on 12/17/2018 at 17:00

## 2018-12-18 VITALS — DIASTOLIC BLOOD PRESSURE: 68 MMHG | SYSTOLIC BLOOD PRESSURE: 130 MMHG

## 2018-12-18 VITALS — DIASTOLIC BLOOD PRESSURE: 86 MMHG | SYSTOLIC BLOOD PRESSURE: 141 MMHG

## 2018-12-18 VITALS — SYSTOLIC BLOOD PRESSURE: 169 MMHG | DIASTOLIC BLOOD PRESSURE: 89 MMHG

## 2018-12-18 VITALS — SYSTOLIC BLOOD PRESSURE: 133 MMHG | DIASTOLIC BLOOD PRESSURE: 72 MMHG

## 2018-12-18 VITALS — SYSTOLIC BLOOD PRESSURE: 140 MMHG | DIASTOLIC BLOOD PRESSURE: 73 MMHG

## 2018-12-18 VITALS — DIASTOLIC BLOOD PRESSURE: 99 MMHG | SYSTOLIC BLOOD PRESSURE: 184 MMHG

## 2018-12-18 PROCEDURE — 0H9MXZX DRAINAGE OF RIGHT FOOT SKIN, EXTERNAL APPROACH, DIAGNOSTIC: ICD-10-PCS | Performed by: PODIATRIST

## 2018-12-18 PROCEDURE — 0H9NXZX DRAINAGE OF LEFT FOOT SKIN, EXTERNAL APPROACH, DIAGNOSTIC: ICD-10-PCS | Performed by: PODIATRIST

## 2018-12-18 RX ADMIN — VANCOMYCIN HYDROCHLORIDE SCH MLS/HR: 1 INJECTION, SOLUTION INTRAVENOUS at 12:46

## 2018-12-18 RX ADMIN — INSULIN LISPRO SCH UNIT: 100 INJECTION, SOLUTION INTRAVENOUS; SUBCUTANEOUS at 12:30

## 2018-12-18 RX ADMIN — INSULIN LISPRO SCH UNIT: 100 INJECTION, SOLUTION INTRAVENOUS; SUBCUTANEOUS at 16:47

## 2018-12-18 RX ADMIN — METFORMIN HYDROCHLORIDE SCH MG: 500 TABLET, FILM COATED ORAL at 17:17

## 2018-12-18 RX ADMIN — CEFEPIME HYDROCHLORIDE SCH MLS/HR: 1 INJECTION, POWDER, FOR SOLUTION INTRAMUSCULAR; INTRAVENOUS at 00:19

## 2018-12-18 RX ADMIN — CEFEPIME HYDROCHLORIDE SCH MLS/HR: 1 INJECTION, POWDER, FOR SOLUTION INTRAMUSCULAR; INTRAVENOUS at 15:00

## 2018-12-18 RX ADMIN — FUROSEMIDE SCH MG: 10 INJECTION, SOLUTION INTRAMUSCULAR; INTRAVENOUS at 09:51

## 2018-12-18 RX ADMIN — INSULIN LISPRO SCH UNIT: 100 INJECTION, SOLUTION INTRAVENOUS; SUBCUTANEOUS at 21:00

## 2018-12-18 RX ADMIN — METFORMIN HYDROCHLORIDE SCH MG: 500 TABLET, FILM COATED ORAL at 08:23

## 2018-12-18 RX ADMIN — Medication SCH MEQ: at 09:51

## 2018-12-18 RX ADMIN — METOPROLOL SUCCINATE SCH MG: 50 TABLET, EXTENDED RELEASE ORAL at 09:52

## 2018-12-18 RX ADMIN — APIXABAN SCH MG: 5 TABLET, FILM COATED ORAL at 17:17

## 2018-12-18 RX ADMIN — Medication SCH MEQ: at 17:18

## 2018-12-18 RX ADMIN — CEFEPIME HYDROCHLORIDE SCH MLS/HR: 1 INJECTION, POWDER, FOR SOLUTION INTRAMUSCULAR; INTRAVENOUS at 13:00

## 2018-12-18 RX ADMIN — FUROSEMIDE SCH MG: 10 INJECTION, SOLUTION INTRAMUSCULAR; INTRAVENOUS at 18:33

## 2018-12-18 RX ADMIN — INSULIN LISPRO SCH UNIT: 100 INJECTION, SOLUTION INTRAVENOUS; SUBCUTANEOUS at 08:21

## 2018-12-18 RX ADMIN — APIXABAN SCH MG: 5 TABLET, FILM COATED ORAL at 09:51

## 2018-12-18 RX ADMIN — TAMSULOSIN HYDROCHLORIDE SCH MG: 0.4 CAPSULE ORAL at 09:51

## 2018-12-18 NOTE — NUR
Patient in bed in sitting position. Family at bedside. IV intact to left FA 20G. O2 on at 
2-3L n/c. Dressing to feet bilaterally intact. Patient denies pain at this time. Continue 
monitor.

## 2018-12-18 NOTE — NUR
Patient became confused and removed all connection and IV. Pt sat at 91% at this time. 
Patient placed on bipap. Will restart IV. Oriented patient. Patient state "I dont know why I 
did this".

## 2018-12-19 VITALS — DIASTOLIC BLOOD PRESSURE: 92 MMHG | SYSTOLIC BLOOD PRESSURE: 153 MMHG

## 2018-12-19 VITALS — SYSTOLIC BLOOD PRESSURE: 143 MMHG | DIASTOLIC BLOOD PRESSURE: 85 MMHG

## 2018-12-19 VITALS — DIASTOLIC BLOOD PRESSURE: 82 MMHG | SYSTOLIC BLOOD PRESSURE: 137 MMHG

## 2018-12-19 VITALS — SYSTOLIC BLOOD PRESSURE: 124 MMHG | DIASTOLIC BLOOD PRESSURE: 70 MMHG

## 2018-12-19 VITALS — DIASTOLIC BLOOD PRESSURE: 74 MMHG | SYSTOLIC BLOOD PRESSURE: 131 MMHG

## 2018-12-19 VITALS — SYSTOLIC BLOOD PRESSURE: 163 MMHG | DIASTOLIC BLOOD PRESSURE: 96 MMHG

## 2018-12-19 RX ADMIN — METFORMIN HYDROCHLORIDE SCH MG: 500 TABLET, FILM COATED ORAL at 17:33

## 2018-12-19 RX ADMIN — APIXABAN SCH MG: 5 TABLET, FILM COATED ORAL at 17:33

## 2018-12-19 RX ADMIN — INSULIN LISPRO SCH UNIT: 100 INJECTION, SOLUTION INTRAVENOUS; SUBCUTANEOUS at 11:30

## 2018-12-19 RX ADMIN — VANCOMYCIN HYDROCHLORIDE SCH MLS/HR: 1 INJECTION, SOLUTION INTRAVENOUS at 13:12

## 2018-12-19 RX ADMIN — CEFEPIME HYDROCHLORIDE SCH MLS/HR: 1 INJECTION, POWDER, FOR SOLUTION INTRAMUSCULAR; INTRAVENOUS at 03:00

## 2018-12-19 RX ADMIN — TAMSULOSIN HYDROCHLORIDE SCH MG: 0.4 CAPSULE ORAL at 09:05

## 2018-12-19 RX ADMIN — METOPROLOL SUCCINATE SCH MG: 50 TABLET, EXTENDED RELEASE ORAL at 09:07

## 2018-12-19 RX ADMIN — FUROSEMIDE SCH MG: 10 INJECTION, SOLUTION INTRAMUSCULAR; INTRAVENOUS at 09:05

## 2018-12-19 RX ADMIN — HYDRALAZINE HYDROCHLORIDE PRN MG: 20 INJECTION INTRAMUSCULAR; INTRAVENOUS at 23:42

## 2018-12-19 RX ADMIN — Medication SCH MEQ: at 09:08

## 2018-12-19 RX ADMIN — CEFEPIME HYDROCHLORIDE SCH MLS/HR: 1 INJECTION, POWDER, FOR SOLUTION INTRAMUSCULAR; INTRAVENOUS at 16:04

## 2018-12-19 RX ADMIN — HYDRALAZINE HYDROCHLORIDE PRN MG: 20 INJECTION INTRAMUSCULAR; INTRAVENOUS at 23:45

## 2018-12-19 RX ADMIN — FUROSEMIDE SCH MG: 10 INJECTION, SOLUTION INTRAMUSCULAR; INTRAVENOUS at 17:33

## 2018-12-19 RX ADMIN — Medication SCH MEQ: at 17:34

## 2018-12-19 RX ADMIN — INSULIN LISPRO SCH UNIT: 100 INJECTION, SOLUTION INTRAVENOUS; SUBCUTANEOUS at 08:30

## 2018-12-19 RX ADMIN — APIXABAN SCH MG: 5 TABLET, FILM COATED ORAL at 09:05

## 2018-12-19 RX ADMIN — INSULIN LISPRO SCH UNIT: 100 INJECTION, SOLUTION INTRAVENOUS; SUBCUTANEOUS at 21:00

## 2018-12-19 RX ADMIN — METFORMIN HYDROCHLORIDE SCH MG: 500 TABLET, FILM COATED ORAL at 09:04

## 2018-12-19 RX ADMIN — INSULIN LISPRO SCH UNIT: 100 INJECTION, SOLUTION INTRAVENOUS; SUBCUTANEOUS at 17:33

## 2018-12-19 NOTE — NUR
Report received from AM nurse Mercedes. Patient received resting in his bed. Denied pain and no 
SOB. No respiration distress noted. Bed in lower position,locked. Call bell within reach. 
Call bell within reach.

## 2018-12-20 VITALS — DIASTOLIC BLOOD PRESSURE: 89 MMHG | SYSTOLIC BLOOD PRESSURE: 143 MMHG

## 2018-12-20 VITALS — SYSTOLIC BLOOD PRESSURE: 153 MMHG | DIASTOLIC BLOOD PRESSURE: 88 MMHG

## 2018-12-20 VITALS — SYSTOLIC BLOOD PRESSURE: 150 MMHG | DIASTOLIC BLOOD PRESSURE: 78 MMHG

## 2018-12-20 VITALS — SYSTOLIC BLOOD PRESSURE: 143 MMHG | DIASTOLIC BLOOD PRESSURE: 89 MMHG

## 2018-12-20 VITALS — DIASTOLIC BLOOD PRESSURE: 90 MMHG | SYSTOLIC BLOOD PRESSURE: 160 MMHG

## 2018-12-20 VITALS — SYSTOLIC BLOOD PRESSURE: 144 MMHG | DIASTOLIC BLOOD PRESSURE: 77 MMHG

## 2018-12-20 LAB
ANION GAP SERPL CALC-SCNC: 14.7 MMOL/L (ref 8–16)
BASOPHILS # BLD AUTO: 0 10*3/UL (ref 0–0.1)
BASOPHILS NFR BLD AUTO: 0.4 % (ref 0–1)
BUN SERPL-MCNC: 20 MG/DL (ref 7–26)
BUN/CREAT SERPL: 24 (ref 6–25)
CALCIUM SERPL-MCNC: 9.1 MG/DL (ref 8.4–10.2)
CHLORIDE SERPL-SCNC: 96 MMOL/L (ref 98–107)
CO2 SERPL-SCNC: 31 MMOL/L (ref 22–29)
DEPRECATED NEUTROPHILS # BLD AUTO: 8.2 10*3/UL (ref 2.1–6.9)
EGFRCR SERPLBLD CKD-EPI 2021: > 60 ML/MIN (ref 60–?)
EOSINOPHIL # BLD AUTO: 0.1 10*3/UL (ref 0–0.4)
EOSINOPHIL NFR BLD AUTO: 1.3 % (ref 0–6)
ERYTHROCYTE [DISTWIDTH] IN CORD BLOOD: 13.7 % (ref 11.7–14.4)
GLUCOSE SERPLBLD-MCNC: 207 MG/DL (ref 74–118)
HCT VFR BLD AUTO: 41.6 % (ref 38.2–49.6)
HGB BLD-MCNC: 13.4 G/DL (ref 14–18)
LYMPHOCYTES # BLD: 1.3 10*3/UL (ref 1–3.2)
LYMPHOCYTES NFR BLD AUTO: 11.8 % (ref 18–39.1)
MCH RBC QN AUTO: 32.3 PG (ref 28–32)
MCHC RBC AUTO-ENTMCNC: 32.2 G/DL (ref 31–35)
MCV RBC AUTO: 100.2 FL (ref 81–99)
MONOCYTES # BLD AUTO: 1 10*3/UL (ref 0.2–0.8)
MONOCYTES NFR BLD AUTO: 8.9 % (ref 4.4–11.3)
NEUTS SEG NFR BLD AUTO: 76.9 % (ref 38.7–80)
PLATELET # BLD AUTO: 265 X10E3/UL (ref 140–360)
POTASSIUM SERPL-SCNC: 3.7 MMOL/L (ref 3.5–5.1)
RBC # BLD AUTO: 4.15 X10E6/UL (ref 4.3–5.7)
SODIUM SERPL-SCNC: 138 MMOL/L (ref 136–145)

## 2018-12-20 RX ADMIN — METOPROLOL SUCCINATE SCH MG: 50 TABLET, EXTENDED RELEASE ORAL at 08:27

## 2018-12-20 RX ADMIN — APIXABAN SCH MG: 5 TABLET, FILM COATED ORAL at 17:19

## 2018-12-20 RX ADMIN — METFORMIN HYDROCHLORIDE SCH MG: 500 TABLET, FILM COATED ORAL at 17:19

## 2018-12-20 RX ADMIN — INSULIN LISPRO SCH UNIT: 100 INJECTION, SOLUTION INTRAVENOUS; SUBCUTANEOUS at 08:26

## 2018-12-20 RX ADMIN — INSULIN LISPRO SCH UNIT: 100 INJECTION, SOLUTION INTRAVENOUS; SUBCUTANEOUS at 21:50

## 2018-12-20 RX ADMIN — Medication SCH MEQ: at 17:19

## 2018-12-20 RX ADMIN — VANCOMYCIN HYDROCHLORIDE SCH MLS/HR: 1 INJECTION, SOLUTION INTRAVENOUS at 11:40

## 2018-12-20 RX ADMIN — SILVER SULFADIAZINE SCH EA: 10 CREAM TOPICAL at 17:20

## 2018-12-20 RX ADMIN — INSULIN LISPRO SCH UNIT: 100 INJECTION, SOLUTION INTRAVENOUS; SUBCUTANEOUS at 17:30

## 2018-12-20 RX ADMIN — TAMSULOSIN HYDROCHLORIDE SCH MG: 0.4 CAPSULE ORAL at 08:26

## 2018-12-20 RX ADMIN — INSULIN LISPRO SCH UNIT: 100 INJECTION, SOLUTION INTRAVENOUS; SUBCUTANEOUS at 11:30

## 2018-12-20 RX ADMIN — CEFEPIME HYDROCHLORIDE SCH MLS/HR: 1 INJECTION, POWDER, FOR SOLUTION INTRAMUSCULAR; INTRAVENOUS at 03:46

## 2018-12-20 RX ADMIN — Medication SCH MEQ: at 08:26

## 2018-12-20 RX ADMIN — METFORMIN HYDROCHLORIDE SCH MG: 500 TABLET, FILM COATED ORAL at 08:26

## 2018-12-20 RX ADMIN — APIXABAN SCH MG: 5 TABLET, FILM COATED ORAL at 08:26

## 2018-12-20 RX ADMIN — FUROSEMIDE SCH MG: 10 INJECTION, SOLUTION INTRAMUSCULAR; INTRAVENOUS at 17:19

## 2018-12-20 RX ADMIN — FUROSEMIDE SCH MG: 10 INJECTION, SOLUTION INTRAMUSCULAR; INTRAVENOUS at 08:26

## 2018-12-20 NOTE — CONSULTATION
DATE OF CONSULTATION:  December 18, 2018 



CHIEF COMPLAINT/HISTORY OF CHIEF COMPLAINT:  Mr. Bryant is a most pleasant 

73-year-old gentleman who is in today complaining of very large bullous 

lesions on the dorsal aspect of both left and right feet, greater on the 

right than on the left.  These lesions he states have been present for the 

last several days and are getting larger.  He was hospitalized for 

congestive heart failure, acute respiratory failure, sepsis.  The patient 

feels he is doing much better.  He is on IV antibiotics and continued 

support.  Primary history with regards to his pneumonia is available 

through chart review.  



PHYSICAL EXAMINATION OF LOWER EXTREMITIES

VASCULAR STATUS:  The patient has mildly palpable pedal pulses, both 

dorsalis pedis and posterior tib.  Skin temperature is warm.  Cap refill is 

adequate.  

NEUROLOGIC:  The patient has what appears to be a loss of protective 

sensation bilaterally with some Selkirk Mackenzie testing.  Bilateral pedal 

edema is present as well.  

DERMATOLOGIC:  Very large bullous lesions.  The right measuring 8 cm x 5 cm 

and 5 cm in depth.  There is a thin appearance to the dorsal skin over this 

mass with an obvious liquid underneath, clear in nature.  Similar lesion is 

present on the dorsum of the left foot.  This one is 7 cm x 4 cm x 4 cm in 

depth.  

MUSCULOSKELETAL:  Otherwise deferred at this point.  The patient is sitting 

in a chair with his feet hanging down.  He states he is able to walk on a 

walker, but has been limiting that due to the bullous lesions on his feet.  





DIAGNOSIS:  Superficial bulla, bilateral feet in a diabetic patient with 

pneumonia, chronic obstructive pulmonary disease and excessive pedal edema. 

 



TREATMENT TODAY:  An I and D was performed utilizing a sterile prep and a 

#15 blade and expression of a significant volume of superficial fluid from 

these large bullous lesions on the dorsal aspect of both feet.  In order to 

facilitate drying, a Betadine wet-to-dry dressing was applied.  This is to 

be changed twice daily.  The patient complained of no discomfort from the 

procedure.  Did feel a mild burning with application of the Betadine, but 

this was primarily on the right foot and not on the left.  The wound depth 

once de-roofed was noted to be full-thickness skin, but without significant 

extension into deep tissues.  No extension into subcutaneous tissue.  No 

exposed tendon or muscle.  This should heal fairly quickly at this point.  

The patient is on Eliquis.  There was no bleeding whatsoever.  The 

appropriate topical dressings are initiated.  We will follow while he is 

in-house.  



 





DD:  12/20/2018 13:06

DT:  12/20/2018 14:22

Job#:  [id number]

## 2018-12-20 NOTE — DIAGNOSTIC IMAGING REPORT
CHEST 2 VIEWS,    



Technique: CHEST 2 VIEWS

Comparison: 12/13/2018

Clinical history:  Follow-up CHF 



DISCUSSION:

Limited by soft tissue attenuation. Contrast is noted in the bowel in the upper

abdomen.



IMPRESSION:

1. Stable mildly enlarged cardiomediastinal silhouette.

2. Low lung volumes with bibasilar vascular crowding/atelectasis.

3. No effusion or pneumothorax.



Signed by: Dr Alison Osei MD on 12/20/2018 6:09 AM

## 2018-12-20 NOTE — NUR
CM SPOKE TO PATIENT AT BEDSIDE REGARDING LONG TERM ACUTE CARE PLACEMENT ORDER. CM INFORMED 
PATIENT OF LTAC SERVICES AND LOC. PATIENT VERBALLY UNDERSTOOD AND AGREED TO TRANSFER TO 
LTAC. PATIENT GIVEN CHOICES FOR LTAC. PATIENT CHOSE [facility] IN [place], [place]. CHOICE LETTER SIGNED AND PLACED IN CHART. CLINICAL PICKED UP BY ROCIO MARMOLEJO, LIAISON FOR 
[facility]. 



PENDING ACCEPTANCE. MOT INITIATED AND PLACED IN PACKET ON CHART



PATIENT TO BE TRANSFERRED TO LONG TERM ACUTE CARE FACILITY:



[facility]

Address: [address], [place] [address]

Phone: [phone]

## 2018-12-20 NOTE — OPERATIVE REPORT
DATE OF PROCEDURE:  December 20, 2018 

 

PODIATRY PROGRESS NOTE



SUBJECTIVE:  The patient is in today for followup of the I&D of the 

superficial bullous lesions on the dorsal aspect of both left and right 

feet. The patient is sitting once again with feet dependent. There is pedal 

edema but no new significant fluid buildup within the superficial tissues 

of the distal forefoot. The lancing and deroofing of the bulla appear to 

have successfully drained the excess fluid from both left and right bullous 

lesions. The left foot is almost completely void of redness. However, the 

right foot still has a fairly significant amount of redness surrounding the 

bullous lesion. The patient is currently wearing wet-to-dry dressings with 

Betadine to dry. Will try something more soothing and switch to a Silvadene 

wet-to-dry and discontinue the Betadine at this point. The patient is well 

on his way to full recovery and will need to see me within 2 weeks of 

discharge. It is understood that he has put in a request to go to [facility] and will likely be heading out for [facility] before or on the 

weekend. The same wound care there with Silvadene wet-to-dry. Culture 

results are still pending from the bullous lesion and will be evaluated and 

reacted to appropriately. 











DD:  12/20/2018 13:08

DT:  12/20/2018 14:26

Job#:  [id number] MARY

## 2018-12-20 NOTE — NUR
Report given to ROWDY Villalpando. Patient transferred to medical surgical unit([id number])at 2350 by bed 
with stable condition. V/S WNL.

## 2018-12-20 NOTE — NUR
Nutrition Screen Note



RD Recommendation for Physician: 

-Continue cardiac diet as ordered 



Plan of Care: RD following, monitoring for adequacy and tolerance



Nutrition reason for involvement:

 Follow up 



Primary Diagnose(s): acute respiratory failure



Ht:69 in 

Wt: 241.06lbs     

BMI:35.6 kg/m2

IBW: 154lbs     



RD Assessment:

12/20  Chart reviewed. Labs and meds reviewed. Currently on IV abx and diuretics. Visited 
pt in the room. Pt reports fair appetite with ~50% observed meal intake. Pt doesnt like 
hospital foods therefore he is not eating as much as he used to. Pt complains of 
constipation with LBM  12/17. No other GI complains noted.  RN is notified. Will continue 
to monitor and follow. 



12/15/2018

Initial encounter with patient. Pt denies having any nausea, vomiting or diarrhea, nor has 
any difficulty chewing or swallowing. Pt reports a good appetite. Pt denies any wt changes

Current Diet: Cardiac diet 



Malnutrition Evaluation (12/15/2018)

The patient does not meet criteria for a specified degree of malnutrition at this time. Will 
re-evaluate at follow-up as appropriate. 



Diet Education Needs Assessment:

Diet education not indicated.



Diet Adequacy:

Meeting calorie needs, Meeting protein needs



Tolerance:  

Tolerating PO



Nutrition Care Level: Low



Amanda Miller MS, RD, LD


-------------------------------------------------------------------------------

Addendum: 12/20/18 at 1418 by Amanda Miller DIET

-------------------------------------------------------------------------------

Consider Ensure Compact BID if PO <50% consistently.

## 2018-12-20 NOTE — NUR
Patient Spo2 88% with 2liters oxygen via NC during sleeping.Patient refused BIPAP at this 
time. Assisted to increased oxygen @4liters via NC. Notified to charge nurse Leah and 
RT.Will continue to monitor.

## 2018-12-21 VITALS — SYSTOLIC BLOOD PRESSURE: 164 MMHG | DIASTOLIC BLOOD PRESSURE: 71 MMHG

## 2018-12-21 VITALS — SYSTOLIC BLOOD PRESSURE: 154 MMHG | DIASTOLIC BLOOD PRESSURE: 73 MMHG

## 2018-12-21 VITALS — SYSTOLIC BLOOD PRESSURE: 126 MMHG | DIASTOLIC BLOOD PRESSURE: 73 MMHG

## 2018-12-21 VITALS — SYSTOLIC BLOOD PRESSURE: 147 MMHG | DIASTOLIC BLOOD PRESSURE: 76 MMHG

## 2018-12-21 VITALS — DIASTOLIC BLOOD PRESSURE: 71 MMHG | SYSTOLIC BLOOD PRESSURE: 164 MMHG

## 2018-12-21 VITALS — SYSTOLIC BLOOD PRESSURE: 155 MMHG | DIASTOLIC BLOOD PRESSURE: 83 MMHG

## 2018-12-21 PROCEDURE — 0DB78ZX EXCISION OF STOMACH, PYLORUS, VIA NATURAL OR ARTIFICIAL OPENING ENDOSCOPIC, DIAGNOSTIC: ICD-10-PCS | Performed by: INTERNAL MEDICINE

## 2018-12-21 RX ADMIN — SILVER SULFADIAZINE SCH EA: 10 CREAM TOPICAL at 09:00

## 2018-12-21 RX ADMIN — FUROSEMIDE SCH MG: 10 INJECTION, SOLUTION INTRAMUSCULAR; INTRAVENOUS at 17:00

## 2018-12-21 RX ADMIN — INSULIN LISPRO SCH UNIT: 100 INJECTION, SOLUTION INTRAVENOUS; SUBCUTANEOUS at 17:00

## 2018-12-21 RX ADMIN — Medication SCH MEQ: at 12:05

## 2018-12-21 RX ADMIN — Medication SCH MEQ: at 17:44

## 2018-12-21 RX ADMIN — METFORMIN HYDROCHLORIDE SCH MG: 500 TABLET, FILM COATED ORAL at 17:44

## 2018-12-21 RX ADMIN — APIXABAN SCH MG: 5 TABLET, FILM COATED ORAL at 17:44

## 2018-12-21 RX ADMIN — TAMSULOSIN HYDROCHLORIDE SCH MG: 0.4 CAPSULE ORAL at 12:05

## 2018-12-21 RX ADMIN — APIXABAN SCH MG: 5 TABLET, FILM COATED ORAL at 12:05

## 2018-12-21 RX ADMIN — INSULIN LISPRO SCH UNIT: 100 INJECTION, SOLUTION INTRAVENOUS; SUBCUTANEOUS at 07:30

## 2018-12-21 RX ADMIN — METOPROLOL SUCCINATE SCH MG: 50 TABLET, EXTENDED RELEASE ORAL at 12:05

## 2018-12-21 RX ADMIN — METFORMIN HYDROCHLORIDE SCH MG: 500 TABLET, FILM COATED ORAL at 12:05

## 2018-12-21 RX ADMIN — INSULIN LISPRO SCH UNIT: 100 INJECTION, SOLUTION INTRAVENOUS; SUBCUTANEOUS at 12:00

## 2018-12-21 RX ADMIN — FUROSEMIDE SCH MG: 10 INJECTION, SOLUTION INTRAMUSCULAR; INTRAVENOUS at 12:05

## 2018-12-21 RX ADMIN — SILVER SULFADIAZINE SCH EA: 10 CREAM TOPICAL at 17:45

## 2018-12-21 NOTE — NUR
12 lead ekg was obtained and tele strip was reviewed by MD. According to cardiology no SVT 
Per cardiology OK to transfer patient to [facility] at this time. Dr. Avalos also notified

## 2018-12-21 NOTE — NUR
Patient arrived back to the floor from recovery. He is awake alert and oriented x3, he is 
not in any distress. He has no complaints at this time. He knows he is no longer NPO. He was 
given some fresh ice water and denies needing anything else at this time, family member is 
in the room, call light in reach, bed locked.

## 2018-12-21 NOTE — NUR
Adequate: hears normal conversation without difficulty RECEIVED MOT FOR LTAC



[facility]

Address: [address], [place] [address]

Phone: [phone]



Dr. Santiago Avalos to attend

Admin: Sam Flowers, 



MOT information received from Rhona Kyle with [facility]



MOT completed and placed at nurses station. Nursing notified.

## 2018-12-21 NOTE — OPERATIVE REPORT
DATE OF PROCEDURE:  December 21, 2018 



REFERRING PHYSICIAN:  Dr. Santiago Bonilla 



PROCEDURE PERFORMED:  Esophagogastroduodenoscopy. 



INDICATIONS FOR EGD:  Focal thickening of esophagus on CT scan of the 

chest.



MEDICATION:  Patient was done under MAC.  Please see anesthesiologist's 

note.



PROCEDURE:  With the patient in the left lateral decubitus position, the 

flexible fiberoptic Olympus gastroscope was introduced into the esophagus 

under direct visualization without any difficulty.  There was some patchy 

erythema noted in the distal esophagus.  There was no obvious thickening 

noted in the esophageal mucosa noted throughout the esophagus.  The scope 

was then advanced with ease into the stomach, traversing a small hiatal 

hernia.  Mucosa overlying the antrum and the body revealed some patchy 

erythema and low-grade edema, and biopsies were obtained and sent to stain 

for H. pylori.  The pylorus was of normal contour and shape.  It was 

intubated with ease, and the scope was advanced all the way to the 2nd 

portion of the duodenum.  The scope was then withdrawn slowly.  Mucosa 

overlying the proximal 2nd portion and the duodenal bulb appeared to be 

within normal limits.  The scope was then withdrawn back into the stomach 

and retroflexed.  Mucosa overlying the fundus and cardia appeared to be 

within normal limits.  The scope was then straightened out.  It was 

subsequently withdrawn.  Patient tolerated the procedure well.



IMPRESSION

1. Mild distal esophagitis and no obvious focal thickening noted.

2. Small sliding hiatal hernia.

3. Gastritis, biopsied.  Biopsies sent to stain for H. pylori.





PLAN:  Follow up histology.  Initiate Protonix 40 mg 1 p.o. q.a.m. a.c.









DD:  12/21/2018 10:41

DT:  12/21/2018 11:09

Job#:  [id number] 



cc:SANTIAGO BONILLA MD

## 2018-12-21 NOTE — NUR
Patient has a room ready at [facility]. Dr Avalos also notified of tele reporting run of v 
tach. Will hold off on transfer until cardiology is notified.

## 2020-11-28 ENCOUNTER — HOSPITAL ENCOUNTER (INPATIENT)
Dept: HOSPITAL 88 - ER | Age: 75
LOS: 2 days | Discharge: HOME | DRG: 605 | End: 2020-11-30
Attending: INTERNAL MEDICINE | Admitting: INTERNAL MEDICINE
Payer: MEDICARE

## 2020-11-28 VITALS — DIASTOLIC BLOOD PRESSURE: 69 MMHG | SYSTOLIC BLOOD PRESSURE: 162 MMHG

## 2020-11-28 VITALS — SYSTOLIC BLOOD PRESSURE: 162 MMHG | DIASTOLIC BLOOD PRESSURE: 69 MMHG

## 2020-11-28 VITALS — HEIGHT: 69 IN | BODY MASS INDEX: 37.55 KG/M2 | WEIGHT: 253.56 LBS

## 2020-11-28 VITALS — DIASTOLIC BLOOD PRESSURE: 82 MMHG | SYSTOLIC BLOOD PRESSURE: 155 MMHG

## 2020-11-28 DIAGNOSIS — I44.0: ICD-10-CM

## 2020-11-28 DIAGNOSIS — I11.0: ICD-10-CM

## 2020-11-28 DIAGNOSIS — G47.33: ICD-10-CM

## 2020-11-28 DIAGNOSIS — E78.5: ICD-10-CM

## 2020-11-28 DIAGNOSIS — I49.3: ICD-10-CM
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
## 2020-11-28 DIAGNOSIS — K21.9: ICD-10-CM

## 2020-11-28 DIAGNOSIS — S00.83XA: ICD-10-CM

## 2020-11-28 DIAGNOSIS — E11.65: ICD-10-CM

## 2020-11-28 DIAGNOSIS — I50.32: ICD-10-CM

## 2020-11-28 DIAGNOSIS — S80.12XA: ICD-10-CM

## 2020-11-28 DIAGNOSIS — R55: ICD-10-CM

## 2020-11-28 DIAGNOSIS — Z74.09: ICD-10-CM

## 2020-11-28 DIAGNOSIS — I48.0: ICD-10-CM

## 2020-11-28 DIAGNOSIS — E66.9: ICD-10-CM

## 2020-11-28 DIAGNOSIS — Z79.01: ICD-10-CM

## 2020-11-28 DIAGNOSIS — I25.10: ICD-10-CM

## 2020-11-28 DIAGNOSIS — Z20.828: ICD-10-CM

## 2020-11-28 DIAGNOSIS — Z95.5: ICD-10-CM

## 2020-11-28 DIAGNOSIS — S80.02XA: Primary | ICD-10-CM

## 2020-11-28 LAB
ALBUMIN SERPL-MCNC: 3.2 G/DL (ref 3.5–5)
ALBUMIN/GLOB SERPL: 1 {RATIO} (ref 0.8–2)
ALP SERPL-CCNC: 62 IU/L (ref 40–150)
ALT SERPL-CCNC: 39 IU/L (ref 0–55)
ANION GAP SERPL CALC-SCNC: 12.5 MMOL/L (ref 8–16)
BACTERIA URNS QL MICRO: (no result) /HPF
BASOPHILS # BLD AUTO: 0 10*3/UL (ref 0–0.1)
BASOPHILS NFR BLD AUTO: 0.3 % (ref 0–1)
BILIRUB UR QL: NEGATIVE
BUN SERPL-MCNC: 32 MG/DL (ref 7–26)
BUN/CREAT SERPL: 27 (ref 6–25)
CALCIUM SERPL-MCNC: 8.8 MG/DL (ref 8.4–10.2)
CHLORIDE SERPL-SCNC: 104 MMOL/L (ref 98–107)
CK MB SERPL-MCNC: 6.2 NG/ML (ref 0–5)
CK MB SERPL-MCNC: 6.8 NG/ML (ref 0–5)
CK SERPL-CCNC: 243 IU/L (ref 30–200)
CK SERPL-CCNC: < 7 IU/L (ref 30–200)
CLARITY UR: (no result)
CO2 SERPL-SCNC: 30 MMOL/L (ref 22–29)
COLOR UR: YELLOW
DEPRECATED APTT PLAS QN: 26.3 SECONDS (ref 23.8–35.5)
DEPRECATED INR PLAS: 1.19
DEPRECATED NEUTROPHILS # BLD AUTO: 7.3 10*3/UL (ref 2.1–6.9)
DEPRECATED RBC URNS MANUAL-ACNC: (no result) /HPF (ref 0–5)
EGFRCR SERPLBLD CKD-EPI 2021: 60 ML/MIN (ref 60–?)
EOSINOPHIL # BLD AUTO: 0 10*3/UL (ref 0–0.4)
EOSINOPHIL NFR BLD AUTO: 0.3 % (ref 0–6)
EPI CELLS URNS QL MICRO: (no result) /LPF
ERYTHROCYTE [DISTWIDTH] IN CORD BLOOD: 16.6 % (ref 11.7–14.4)
GLOBULIN PLAS-MCNC: 3.3 G/DL (ref 2.3–3.5)
GLUCOSE SERPLBLD-MCNC: 186 MG/DL (ref 74–118)
HCT VFR BLD AUTO: 37.2 % (ref 38.2–49.6)
HGB BLD-MCNC: 11.8 G/DL (ref 14–18)
KETONES UR QL STRIP.AUTO: (no result)
LEUKOCYTE ESTERASE UR QL STRIP.AUTO: NEGATIVE
LYMPHOCYTES # BLD: 1.5 10*3/UL (ref 1–3.2)
LYMPHOCYTES NFR BLD AUTO: 14.7 % (ref 18–39.1)
MCH RBC QN AUTO: 32.8 PG (ref 28–32)
MCHC RBC AUTO-ENTMCNC: 31.7 G/DL (ref 31–35)
MCV RBC AUTO: 103.3 FL (ref 81–99)
MONOCYTES # BLD AUTO: 1 10*3/UL (ref 0.2–0.8)
MONOCYTES NFR BLD AUTO: 10.2 % (ref 4.4–11.3)
NEUTS SEG NFR BLD AUTO: 73.6 % (ref 38.7–80)
NITRITE UR QL STRIP.AUTO: NEGATIVE
PLATELET # BLD AUTO: 293 X10E3/UL (ref 140–360)
POTASSIUM SERPL-SCNC: 4.5 MMOL/L (ref 3.5–5.1)
PROT UR QL STRIP.AUTO: NEGATIVE
PROTHROMBIN TIME: 15.7 SECONDS (ref 11.9–14.5)
RBC # BLD AUTO: 3.6 X10E6/UL (ref 4.3–5.7)
SODIUM SERPL-SCNC: 142 MMOL/L (ref 136–145)
SP GR UR STRIP: >=1.03 (ref 1.01–1.02)
UROBILINOGEN UR STRIP-MCNC: 1 MG/DL (ref 0.2–1)
WBC #/AREA URNS HPF: (no result) /HPF (ref 0–5)

## 2020-11-28 PROCEDURE — 84443 ASSAY THYROID STIM HORMONE: CPT

## 2020-11-28 PROCEDURE — 36415 COLL VENOUS BLD VENIPUNCTURE: CPT

## 2020-11-28 PROCEDURE — 85610 PROTHROMBIN TIME: CPT

## 2020-11-28 PROCEDURE — 73701 CT LOWER EXTREMITY W/DYE: CPT

## 2020-11-28 PROCEDURE — 93306 TTE W/DOPPLER COMPLETE: CPT

## 2020-11-28 PROCEDURE — 84100 ASSAY OF PHOSPHORUS: CPT

## 2020-11-28 PROCEDURE — 85025 COMPLETE CBC W/AUTO DIFF WBC: CPT

## 2020-11-28 PROCEDURE — 80053 COMPREHEN METABOLIC PANEL: CPT

## 2020-11-28 PROCEDURE — 80061 LIPID PANEL: CPT

## 2020-11-28 PROCEDURE — 82553 CREATINE MB FRACTION: CPT

## 2020-11-28 PROCEDURE — 80048 BASIC METABOLIC PNL TOTAL CA: CPT

## 2020-11-28 PROCEDURE — 71045 X-RAY EXAM CHEST 1 VIEW: CPT

## 2020-11-28 PROCEDURE — 70450 CT HEAD/BRAIN W/O DYE: CPT

## 2020-11-28 PROCEDURE — 93971 EXTREMITY STUDY: CPT

## 2020-11-28 PROCEDURE — 82550 ASSAY OF CK (CPK): CPT

## 2020-11-28 PROCEDURE — 87086 URINE CULTURE/COLONY COUNT: CPT

## 2020-11-28 PROCEDURE — 93880 EXTRACRANIAL BILAT STUDY: CPT

## 2020-11-28 PROCEDURE — 83735 ASSAY OF MAGNESIUM: CPT

## 2020-11-28 PROCEDURE — 84484 ASSAY OF TROPONIN QUANT: CPT

## 2020-11-28 PROCEDURE — 99284 EMERGENCY DEPT VISIT MOD MDM: CPT

## 2020-11-28 PROCEDURE — 81001 URINALYSIS AUTO W/SCOPE: CPT

## 2020-11-28 PROCEDURE — 93005 ELECTROCARDIOGRAM TRACING: CPT

## 2020-11-28 PROCEDURE — 85730 THROMBOPLASTIN TIME PARTIAL: CPT

## 2020-11-28 PROCEDURE — 83880 ASSAY OF NATRIURETIC PEPTIDE: CPT

## 2020-11-28 PROCEDURE — 83036 HEMOGLOBIN GLYCOSYLATED A1C: CPT

## 2020-11-28 RX ADMIN — INSULIN HUMAN SCH UNIT: 100 INJECTION, SOLUTION PARENTERAL at 21:30

## 2020-11-28 RX ADMIN — SIMVASTATIN SCH MG: 20 TABLET, FILM COATED ORAL at 21:00

## 2020-11-28 NOTE — XMS REPORT
Continuity of Care Document

                             Created on: 2020



ANDREW DONALDSON JR

External Reference #: [id number]

: 1945

Sex: Male



Demographics





                          Address                   [address] [place]  [address]

 

                          Home Phone                [phone]

 

                          Preferred Language        English

 

                          Marital Status            Unknown

 

                          Synagogue Affiliation     Unknown

 

                          Race                      Unknown

 

                                        Additional Race(s) 

White



 

                          Ethnic Group              Non-





Author





                          Author                    [facility]

t

 

                          Organization              [facility]

 

                          Address                   [address] [place] [address] [address]

[place]  [address]



 

                          Phone                     Unavailable







Support





                Name            Relationship    Address         Phone

 

                NILSON BREWER    ECON            Unknown         [phone]







Care Team Providers





                    Care Team Member Name Role                Phone

 

                    SONJA BURRIS M.D. PCP                 [phone]

 

                    EVETTE BONILLA      Attphys             Unavailable

 

                    AMINATA,  REYES   Attphys             Unavailable

 

                    EVETTE BONILLA      Admphys             Unavailable

 

                    AMINATA,  REYES   Admphys             Unavailable







Payers





           Payer Name Policy Type Policy Number Effective Date Expiration Date S

alfonso

 

           Miscellaneous o            [id number] 2016 00:00:00          

  [facility]

 

           Medicare A & B            [id number] 2010 00:00:00            [facility]







Problems





           Condition Name Condition Details Condition Category Status     Onset 

Date Resolution

Date            Last Treatment Date Treating Clinician Comments        Source

 

        Congestive heart failure CHF (congestive heart failure) Problem Active  

                                 

                                        [facility]

 

       Hypotension Hypotension Problem Active                                   

 [facility]

 

       Acute respiratory failure Acute respiratory failure Problem Active       

                             [facility]

 

       Dyspnea Dyspnea Problem Active                                    [facility]

 

       Sepsis Sepsis Problem Active                                    [facility]

 

                          Diabetes                                          Diab

etes                        Active         

                                      Problem                        2020 
                                              Enayet Rahim                     

Problem   Active                        2020 05:11:04                     

[facility]

 

                          BPH (benign prostatic hyperplasia)                    

     BPH (benign prostatic

hyperplasia)                        Active                                      
         Problem                        2020                              
                 Enayet Rahim                     Problem      Active           

                      2020 

05:11:04                                                    [facility]

 

                          HTN (hypertension)                                HTN 

(hypertension)                   

    Active                                                Problem               
        2020                                                Enayet Rahim  
                  Problem Active                  2020 05:11:04           

      [facility]

 

                          Carotid arterial disease                          Farah

tid arterial disease       

                Active                                                Problem   
                    2020                                                
Enayet Rahim                     Problem Active                  2020 05:1

1:04                 

[facility]

 

                          PAD (peripheral artery disease)                       

  PAD (peripheral artery 

disease)                        Active                                          
     Problem                        2020                                  
             Vivian Cedillo                     Problem      Active               

                  2020 

05:11:04                                                    [facility]

 

                          Pain in left knee                                 Pain

 in left knee                     

  Active                                                Diagnosis               
        2017                                                Vivian Cedillo  
                  Diagnosis Active                  2017 05:10:49         

        [facility]

 

                          Other chronic pain                                Othe

r chronic pain                   

    Active                                                Problem               
        2020                                                Vivian Cedillo  
                  Problem Active                  2020 05:11:04           

      [facility]

 

                          Fall, initial encounter                           Fall

, initial encounter         

              Active                                                Diagnosis   
                    2020                                                
Vivian Cedillo                     Diagnosis Active                  2020 05

:10:53                 

[facility]

 

                          Acute pain of right shoulder                         A

cute pain of right 

shoulder                        Active                                          
     Diagnosis                        2018                                
               Vivian Cedillo                     Diagnosis    Active             

                    2018 

04:11:19                                                    [facility]

 

                          Chronic diastolic heart failure                       

  Chronic diastolic heart 

failure                        Active                                           
    Problem                        2020                                   
            Koffi Barriga MD, PA                     Problem      Active        

                         

2020 04:10:09                                         [facility]

 

                          Occlusion and stenosis of right carotid artery        

                 Occlusion

and stenosis of right carotid artery                        Active              
                                 Problem                        2020      
                                         Koffi Barriga MD, PA                   
        Problem Active                  2020 04:10:09                 Ant [facility]

 

                          Abnormal electrocardiogram [ECG] [EKG]                

         Abnormal 

electrocardiogram [ECG] [EKG]                        Active                     
                           Problem                        2020            
                                    Koffi Barriga MD, PA                     

Problem   Active                        2020 04:10:09                     

[facility]

 

                          Obesity, unspecified                              Obes

ity, unspecified               

         Active                                                Problem          
              2020                                                Koffi Barriga MD, PA                     Problem Active                  2020 04

:10:09                 

[facility]

 

                          Body mass index (BMI) 38.0-38.9, adult                

         Body mass index 

(BMI) 38.0-38.9, adult                        Active                            
                    Problem                        2020                   
                             Koffi Barriga MD, PA                     Problem   

                

Active                           2020 04:10:09                       J[facility]

 

                          Type 2 diabetes mellitus with diabetic peripheral melissa

opathy without gangrene   

                      Type 2 diabetes mellitus with diabetic peripheral 
angiopathy without gangrene                        Active                       
                         Problem                        2020              
                                  Koffi Barriga MD, PA                     

Problem   Active                        2020 04:10:09                     

[facility]

 

                          Coronary angioplasty status                         Co

ronary angioplasty status 

                       Active                                                
Problem                        2020                                       
         Koffi Barriga MD, PA                     Problem      Active           

                      2020 

04:10:09                                                    [facility]

 

                          Coronary atherosclerosis of native coronary artery    

                     

Coronary atherosclerosis of native coronary artery                        Active
                                                Problem                        
2020                                                Koffi Barriga MD, PA  
                   Problem Active                  2020 04:10:09          

       [facility]

 

                          Peripheral vascular disease                         Pe

ripheral vascular disease 

                       Active                                                
Problem                        2020                                       
         Koffi Barriga MD, PA                     Problem      Active           

                      2020 

04:10:09                                                    [facility]

 

                          Diabetes mellitus type II                         Diab

etes mellitus type II     

                   Active                                                Problem
                        2020                                              
  Koffi Barriga MD, PA                     Problem      Active                  

               2020 

04:10:09                                                    [facility]

 

                          Abnormal ECG                                      Abno

rmal ECG                        Active 

                                               Problem                        
2020                                                Koffi Barriga MD, PA  
                   Problem Active                  2020 04:10:09          

       [facility]

 

                          Morbid obesity                                    Morb

id obesity                        

Active                                                Problem                   
     2020                                                Koffi Barriga MD,
 PA                     Problem Active                  2020 04:10:09     

            [facility]

 

                          Postprocedural PCI status                         Post

procedural PCI status     

                   Active                                                Problem
                        2020                                              
  Koffi Barriga MD, PA                     Problem      Active                  

               2020 

04:10:09                                                    [facility]

 

                          Mixed hyperlipidemia                              Mixe

d hyperlipidemia               

         Active                                                Problem          
              2020                                                Koffi Barriga MD, PA                     Problem Active                  2020 04

:10:09                 

[facility]

 

                          Carotid stenosis                                  Farah

tid stenosis                       

 Active                                                Problem                  
      2020                                                Koffi Barriga MD, PA                     Problem Active                  2020 04:10:09  

               [facility]

 

                          Chronic systolic CHF (congestive heart failure)       

                  Chronic 

systolic CHF (congestive heart failure)                        Active           
                                     Problem                        2020  
                                              Enayet Rahim                     

Problem   Active                        2020 05:11:04                     

[facility]

 

                          PAF (paroxysmal atrial fibrillation)                  

       PAF (paroxysmal 

atrial fibrillation)                        Active                              
                  Problem                        2020                     
                           Enayet Rahim                     Problem      Active 

                                

2020 05:11:04                                         [facility]

 

                          Atrial fibrillation- Chronic                         A

trial fibrillation- 

Chronic                        Active                                           
     Problem                        2020                                  
              Koffi Barriga MD, PA                     Problem      Active      

                           

2020 04:10:09                                         [facility]

 

                          Perforated right tympanic membrane on examination     

                    

Perforated right tympanic membrane on examination                        Active 
                                               Diagnosis                        
10/24/2018                                                Enayet Rahim          
           Diagnosis Active                  2018-10-24 04:10:34                

 [facility]

 

                          Body mass index (BMI) 37.0-37.9, adult                

         Body mass index 

(BMI) 37.0-37.9, adult                        Active                            
                    Problem                        2020                   
                             Koffi Barriga MD, PA                     Problem   

                

Active                           2020 04:10:09                       [facility]ro[facility]

 

                          Other bilateral secondary osteoarthritis of knee      

                   Other 

bilateral secondary osteoarthritis of knee                        Active        
                                        Diagnosis                        
2017                                                [facility]          
           Diagnosis Active                  2017 05:33:23                

 [facility]

 

                          Acute tracheobronchitis                           Acut

e tracheobronchitis         

               Active                                                Problem    
                    2020                                                
Enparisaet Rahim                     Problem Active                  2020 05:1

1:04                 

[facility]

 

                          Other obesity due to excess calories                  

       Other obesity due 

to excess calories                        Active                                
                Problem                        2020                       
                         Koffi Barriga MD, PA                     Problem       

            Active

                                 2020 04:10:09                       [facility]

[facility]

 

                          BMI 35.0-35.9,adult                               BMI 

35.0-35.9,adult                 

       Active                                                Problem            
            2020                                                [facility]                     Problem Active                  2020 05:11:04   

              [facility]

 

                          Flu vaccine need                                  Flu 

vaccine need                       

 Active                                                Diagnosis                
        2019                                                [facility]  
                   Diagnosis Active                  2019 05:11:17        

         [facility]

 

                          Risk for falls                                    Risk

 for falls                        

Active                                                Diagnosis                 
       2020                                                [facility]   
                  Diagnosis Active                  2020 05:10:53         

        [facility]

 

                          Eye exam abnormal                                 Eye 

exam abnormal                     

   Active                                                Diagnosis              
          09/10/2019                                                Vivian Carm
                     Diagnosis Active                  2019-09-10 04:10:50      

           [facility]

 

                          Pulmonary hypertension, unspecified                   

      Pulmonary 

hypertension, unspecified                        Active                         
                       Problem                        2020                
                                Koffi Barriga MD, PA                     Problem

          Active                        2020 04:10:09                     

[facility]

 

                          CAD (coronary artery disease)                         

CAD (coronary artery 

disease)                        Active                                          
      Problem                        2020                                 
               Enparisaet Srinathhim                     Problem      Active             

                    2020 

05:11:04                                                    [facility]

 

                          Bilateral leg edema                               Bila

teral leg edema                 

       Active                                                Diagnosis          
              2020                                                Enparisaet 
Josepm                     Diagnosis Active                  2020 04:11:12 

                [facility]

 

                          BMI 36.0-36.9,adult                               BMI 

36.0-36.9,adult                 

       Active                                                Problem            
            2020                                                Enparisaet 
Josepm                     Problem Active                  2020 05:11:04   

              [facility] 

[facility]

 

                          Cellulitis of leg without foot, left                  

       Cellulitis of leg 

without foot, left                        Active                                
                Diagnosis                        2020                     
                           Vivian Cedillo                     Diagnosis       Acti

ve                           

                2020 05:11:08                                 [facility]

 

                          Hematoma of left lower extremity, initial encounter   

                      

Hematoma of left lower extremity, initial encounter                        
Active                                                Diagnosis                 
       2020                                                Vivian Cedillo   
                  Diagnosis Active                  2020 05:10:53         

        [facility]

 

                          Transient Ischemic Attack                         [place]

sient Ischemic Attack     

                   Active                                                       
                 2014                                                [facility]                     Problem Active                  2014 22:03:

04                 

[facility]

 

                          Diabetes Mellitus With Peripheral Circulatory Disorder

                         

Diabetes Mellitus With Peripheral Circulatory Disorder                        
Active                                                                        
2014                                                [facility]         
            Problem Active                  2014 22:03:04                 

[facility]

 

                          Hypertension                                      Hype

rtension                        Active 

                                                                       
2014                                                [facility]         
            Problem Active                  2014 22:03:04                 

[facility]

 

                          Coronary Artery Disease                           Fran

nary Artery Disease         

               Active                                                           
             2014                                                [facility]                     Problem Active                  2014 22:03:

04                 

[facility]

 

                          Thyroid Function Tests Nonspecific Abnormal Findings  

                       

Thyroid Function Tests Nonspecific Abnormal Findings                        
Active                                                                        
2014                                                [facility]         
            Problem Active                  2014 22:03:04                 

[facility]

 

                          Vaccines Prophylactic Need Against Influenza          

               Vaccines 

Prophylactic Need Against Influenza                        Active               
                                                         2014             
                                   [facility]                     Problem    

               

Active                           2014 22:03:04                       [facility]

 

                          Vitamin D Deficiency                              Carrie

min D Deficiency               

         Active                                                                 
       2014                                                [facility]  
                   Problem Active                  2014 22:03:04          

       [facility]

 

                          Acute Bronchitis                                  Acut

e Bronchitis                       

 Active                                                                        
2014                                                [facility]         
            Problem Active                  2014 22:03:04                 

[facility]







Allergies, Adverse Reactions, Alerts





        Allergy Name Allergy Type Status  Severity Reaction(s) Onset Date Inacti

ve Date 

Treating Clinician        Comments                  Source

 

       N.YAHAIRA.A. N.K.D.A. Active        Info Not Available 2020 00:00:00   

                   [facility]

 

       No Known Drug Allergies No Known Drug Allergies Active                   

                        [facility]







Family History





           Family Member Diagnosis  Comments   Start Date Stop Date  Source

 

           Unknown Family Member Family History            2013 04:46:23 2

 04:46:23 

[facility]







Social History





           Social Habit Start Date Stop Date  Quantity   Comments   Source

 

           TobaccoUse: 2016 00:00:00 2016 00:00:00                  

     [facility]

 

           Social History 2014 22:03:04 2014 22:03:04               

        [facility]







Medications





             Ordered Medication Name Filled Medication Name Start Date   Stop Da

te    Current 

Medication? Ordering Clinician Indication Dosage     Frequency  Signature (SIG) 

Comments                  Components                Source

 

       Metformin HCl        2020 05:10:53        Yes    Sonja Tasia      

                1 tablet with meals

                                                            [facility]

 

      Clopidogrel Bisulfate       2020 05:10:53       Yes   Sonja Tasia  

                 1 tablet       

                                        [facility]

 

     Losartan Potassium      2020 05:10:53      Yes  Sonja Tasia         

       1 tablet           

[facility]

 

     Furosemide      2020 05:10:53      Yes  Sonja Tasia                1

 tablet           [facility]

 

     Novolin N      2020 05:10:53      Yes  Sonja Tasia                20

-25 units           

[facility]

 

     Novolin R      2020 05:10:53      Yes  Sonja Tasia                36

 units           [facility]

 

     Eliquis      2020 05:10:53      Yes  Sonja Tasia                1 ta

blet           [facility]

 

       Simvastatin        2020 05:10:53        Yes    Sonja Tasia        

              1 tablet in the 

evening                                                     [facility]

 

     Tamsulosin HCl      2020 05:10:53      Yes  Sonja Tasia             

   1 capsule           

[facility]

 

     Cephalexin      2020-11-10 00:00:00      Yes  Sonja Tasia                1

 capsule           [facility]

 

     Fish Oil      2020 04:10:09      Yes  Koffi Manuelyan            

    1 capsule           

[facility]

 

       Metformin HCl        2020 04:10:09        Yes    Koffi Lorenzo

n                      1 tablet 

with meals                                                  [facility]

 

     Humalog      2020 04:10:09      Yes  Koffi Gagen Tayyan             

   not defined           

[facility]

 

     Tradjenta      2020 04:10:09      Yes  Koffiadonis Hernandezwan Tayyan           

     1 tablet           

[facility]

 

     Levemir      2020 04:10:09      Yes  Koffi Gagen Tayyan             

   not defined           

[facility]

 

       Losartan Potassium        2020 04:10:09        Yes    Koffi Hernandezwan 

Tayyan                      1 

tablet                                                      [facility]

 

       Simvastatin        2020 04:10:09        Yes    Koffiadonis Hernandezwan Tayyan 

                     1 tablet in 

the evening                                                 [facility]

 

     Furosemide      2020 04:10:09      Yes  Koffi Hernandezwan Tayyan          

      1 tablet           

[facility]

 

       Metoprolol Succinate ER        2020 04:10:09        Yes    Koffiadonis Singh

samirwagner Tayyan                      1 

tablet                                                      [facility]

 

       Metoprolol Succinate ER        2020 04:10:12        Yes    Koffi Ma

angeles Barriga                      1 

tablet                                                      [facility]

 

       Furosemide        2020 04:10:27        Yes    Koffi Amna Barriga  

                    TAKE 1 TABLET 

BY MOUTH ONCE DAILY FOR 30 DAYS                                         [facility]

 [place]

 

     Tamsulosin HCl      2020 04:11:14      Yes  Sonja Tasia             

   1 capsule           

[facility]

 

       Simvastatin        2020 04:11:14        Yes    Sonja Tasia        

              1 tablet in the 

evening                                                     [facility]

 

     Tamsulosin HCl      2020 00:00:00      Yes  Sonja Tasia             

   1 capsule           

[facility]

 

       Isosorbide Mononitrate        2020 00:00:00        Yes    Koffi Barriga                      1 

tablet                                                      [facility]

 

       Metoprolol Succinate ER        2019 04:10:07        Yes    Koffi Ma

samirwagner Nithya                      1 

tablet                                                      [facility]

 

     Simvastatin      2018-12-15 05:10:52      Yes  Sonja Tasia                

not defined           

[facility]

 

      Losartan Potassium       2018-12-15 05:10:52       Yes   Sonja Tasia     

              not defined       

                                        [facility]

 

       Azithromycin        2018 00:00:00        Yes    Sonja Tasia       

               2 tablets  on the 

first day, then 1 tablet daily for 4 days                                       

  [facility]

 

     PredniSONE      2018 00:00:00      Yes  Sonja Tasia                1

 tablet           [facility]

 

       Ciprodex        2018-10-18 00:00:00        Yes    Sonjamandi Burris           

           4 drops into affected 

ear                                                         [facility]

 

     Furosemide      2018 00:00:00      Yes  Koffi Barriga          

      1 tablet           

[facility]

 

     Eliquis      2018 00:00:00      Yes  Koffi Barriga             

   1 tablet           

[facility]

 

     Aspirin      2018 00:00:00      Yes  Koffi Barriga             

   1 tablet           

[facility]

 

       Clopidogrel Bisulfate        2018 04:11:19        Yes    Sonjamandi Palominow

ar                      not defined

                                                            [facility]

 

     Ramipril      2018 04:11:19      Yes  Sonja Tasia                not

 defined           [facility]

 

       Metoprolol Succinate ER        2018 04:11:19        Yes    Sonja Sa

rwar                      not 

defined                                                     [facility]

 

      Tramadol HCl       2018 00:00:00       Yes   Sonja Tasia           

        1 tablet as needed  

                                                    [facility]

 

     Aspirin      2018 04:10:10      Yes  Koffi Manuelyan             

   1 tablet           

[facility]

 

       Metoprolol Succinate ER        2018 04:10:10        Yes    Koffi dawsonwagner Tayyan                      1 

tablet                                                      [facility]

 

     Simvastatin      2017 05:10:49      Yes  Sonja Tasia                

not defined           

[facility]

 

       Metoprolol Succinate ER        2017 05:10:49        Yes    Sonja Sa

rwar                      not 

defined                                                     [facility]

 

     Metformin HCl      2017 05:10:49      Yes  Sonja Tasia              

  not defined           

[facility]

 

     Ramipril      2017-10-13 04:10:15      Yes  Koffi Manuelyan            

    1 capsule           

[facility]

 

     Tamsulosin HCl      2017 04:10:17      Yes  Sonja Tasia             

   1 capsule           

[facility]

 

     Tamsulosin HCl      2017-02-15 00:00:00      Yes  Sonja Tasia             

   1 capsule           

[facility]

 

     Ramipril      2017 05:14:09      Yes  Koffi Manuelyan            

    1 capsule           

[facility]

 

       Metoprolol Succinate ER        2017 05:14:09        Yes    Koffi dawsonwagner Tayyan                      1 

tablet                                                      [facility]

 

     Levemir      2017 05:14:09      Yes  Koffi Manuelyan             

   Unknown           

[facility]

 

       Metformin HCl        2017 05:14:09        Yes    Koffi Lorenzo

n                      1 tablet 

with meals                                                  [facility]

 

       Clopidogrel Bisulfate        2017 05:14:09        Yes    Koffi edward Tayyan                      1 

tablet                                                      [facility]

 

     Humalog      2017 05:14:09      Yes  Koffi Manuelyan             

   Unknown           

[facility]

 

     Fish Oil      2017 05:14:09      Yes  Koffi Manuelyan            

    1 capsule           

[facility]

 

       Simvastatin        2017 05:14:09        Yes    Koffi Manuelyan 

                     1 tablet in 

the evening                                                 [facility]

 

     Tradjenta      2017 05:14:09      Yes  Koffi Gagen Tayyan           

     1 tablet           

[facility]

 

     Aspirin      2017 05:14:09      Yes  Koffi Gagen Tayyan             

   1 tablet           

[facility]

 

     Januvia      2016 05:14:14      Yes  Koffi Manuelyan             

   2 tablets           

[facility]

 

       Azithromycin 250 MG Oral Tablet        2013 06:00:00        Yes    

                            ; Start 

Date: 2013; End Date: 1900 (Active)                                 

        [facility]

 

       Dexamethasone 4 MG Oral Tablet        2013 06:00:00        Yes     

                           ; Start Date:

 2013; End Date: 2013 (Active)                                      

   [facility]

 

       Accu-Chek Kati In Vitro Strip        2012 05:00:00        Yes     

                           ; Start Date:

 2012 (Active)                                         [facility]

 

       Accu-Chek Multiclix Lancets Miscellaneous        2012 05:00:00     

   Yes                                ; 

Start Date: 2012 (Active)                                         [facility]

 

       Pen Needles 5/16" 31G X 8 MM Miscellaneous        2012 05:00:00    

    Yes                                ;

 Start Date: 2012 (Active)                                         [facility]

 

       Januvia 100 MG Oral Tablet        2012 05:00:00        Yes         

                       ; Start Date: 

2012 (Active)                                         [facility]

 

       MetFORMIN HCl 1000 MG Oral Tablet        2012 05:00:00        Yes  

                              ; Start 

Date: 2012 (Active)                                         [facility]

nn

 

          Levemir FlexPen 100 UNIT/ML Subcutaneous Solution           2012

 05:00:00           Yes                 

                                 ; Start Date: 2012 (Active)              

         [facility]

 

             Cyanocobalamin 2500 MCG Sublingual Tablet Sublingual              2

 05:00:00              Yes

                                        ; Start Date: 2012 (Active)       

          [facility]

 

       Clopidogrel Bisulfate 75 MG Oral Tablet        2012 05:00:00       

 Yes                                ; 

Start Date: 2012 (Active)                                         [facility]

 

       Hydrochlorothiazide 12.5 MG Oral Tablet        2012 05:00:00       

 Yes                                ; 

Start Date: 2012 (Active)                                         [facility]

 

          HumaLOG KwikPen 100 UNIT/ML Subcutaneous Solution           2012

 05:00:00           Yes                 

                                 ; Start Date: 2012 (Active)              

         [facility]

 

                    Metoprolol Succinate ER 50 MG Oral Tablet Extended Release 2

4 Hour                     2012 

05:00:00        Yes                                ; Start Date: 2012 (Act

rickey)               [facility]

 

       Ramipril 10 MG Oral Capsule        2012 05:00:00        Yes        

                        ; Start Date: 

2012 (Active)                                         [facility]

 

       Crestor 20 MG Oral Tablet        2012 05:00:00        Yes          

                      ; Start Date: 

2012 (Active)                                         [facility]

 

       Vitamin D 2000 UNIT Oral Capsule        2012 05:00:00        Yes   

                             ; Start 

Date: 2012 (Active)                                         [facility]

nn

 

       AmLODIPine Besylate 2.5 MG Oral Tablet        2012 05:00:00        

Yes                                ; 

Start Date: 2012 (Active)                                         [facility]

 

     Eliquis Eliquis           Yes            5         Twice A Day           [facility]

 

     Furosemide 40 Mg Tablet Furosemide 40 Mg Tablet           Yes            40

        Daily           [facility]

 

        Losartan Potassium 50 Mg Tablet Losartan Potassium 50 Mg Tablet         

        Yes                     50       

                Daily                                           [facility]

 

       Metformin Hcl 500 Mg Tablet Metformin Hcl 500 Mg Tablet               Yes

                  500           Twice 

A Day                                                       [facility]

 

                Metoprolol Succinate 100 Mg Tab.er.24h Metoprolol Succinate 100 

Mg Tab.er.24h                 

        Yes                     100             Daily                   [facility]

 

       Simvastatin 20 Mg Tablet Simvastatin 20 Mg Tablet               Yes      

            20            Today At 

9:00PM                                                      [facility]

 

        Tamsulosin Hcl 0.4 Mg Cap.er.24h Tamsulosin Hcl 0.4 Mg Cap.er.24h       

          Yes                     .4

                          Daily                                  [facility]

 

                          Insulin Regular, Human (Novolin R) 100 Unit/1 Ml Vial,

 36 Units Sub-Q Insulin 

Regular, Human (Novolin R) 100 Unit/1 Ml Vial, 36 Units Sub-Q                   

        2018 

00:00:00 No                      36              Before Meals                 [facility]

 

                          Levofloxacin (Levaquin) 500 Mg Tablet, 500 Mg Oral Lev

ofloxacin (Levaquin) 500 

Mg Tablet, 500 Mg Oral       2018 00:00:00 No                500         D

aily             [facility]







Vital Signs





             Vital Name   Observation Time Observation Value Comments     Source

 

             Weight       2020 21:15:00                           [facility]

 

             Height       2020 21:15:00                           [facility]

 

             Temperature Oral (F) 2020 21:15:00 97.4 F                    

[facility]

 

             Diastolic (mm Hg) 2020 21:15:00                           [facility]

 

             Systolic (mm Hg) 2020 21:15:00                           [facility]

[facility]

 

             Weight       2020-11-10 20:30:00                           [facility]

 

             Height       2020-11-10 20:30:00                           [facility]

 

             Temperature Oral (F) 2020-11-10 20:30:00 98 F                      

[facility]

 

             Diastolic (mm Hg) 2020-11-10 20:30:00                           [facility]

 

             Systolic (mm Hg) 2020-11-10 20:30:00                           [facility]

[facility]

 

             Weight       2020-10-27 21:15:00                           [facility]

 

             Height       2020-10-27 21:15:00                           [facility]

 

             Temperature Oral (F) 2020-10-27 21:15:00 97.5 F                    

[facility]

 

             Diastolic (mm Hg) 2020-10-27 21:15:00                           [facility]

 

             Systolic (mm Hg) 2020-10-27 21:15:00                           [facility]

[facility]

 

             Weight       2020 18:15:00                           [facility]

 

             Heart Rate   2020 18:15:00                           [facility]

 

             Diastolic (mm Hg) 2020 18:15:00                           [facility]

 

             Systolic (mm Hg) 2020 18:15:00                           [facility]

[facility]

 

             Weight       2020 20:00:00                           [facility]

 

             Height       2020 20:00:00                           [facility]

 

             Temperature Oral (F) 2020 20:00:00 97.3 F                    

[facility]

 

             Diastolic (mm Hg) 2020 20:00:00                           [facility]

 

             Systolic (mm Hg) 2020 20:00:00                           [facility]

[facility]

 

             Weight       2020 19:00:00                           [facility]

 

             Heart Rate   2020 19:00:00                           [facility]

 

             Diastolic (mm Hg) 2020 19:00:00                           [facility]

 

             Systolic (mm Hg) 2020 19:00:00                           [facility]

[facility]

 

             Weight       2020 14:00:00                           [facility]

 

             Height       2020 14:00:00                           [facility]

 

             Temperature Oral (F) 2020 14:00:00 97.5 F                    

[facility]

 

             Diastolic (mm Hg) 2020 14:00:00                           [facility]

 

             Systolic (mm Hg) 2020 14:00:00                           [facility]

[facility]

 

             Weight       2020 20:15:00                           [facility]

 

             Heart Rate   2020 20:15:00                           [facility]

 

             Diastolic (mm Hg) 2020 20:15:00                           [facility]

 

             Systolic (mm Hg) 2020 20:15:00                           [facility]

[facility]

 

             Weight       2020 20:15:00                           [facility]

 

             Heart Rate   2020 20:15:00                           [facility]

 

             Diastolic (mm Hg) 2020 20:15:00                           [facility]

 

             Systolic (mm Hg) 2020 20:15:00                           [facility]

[facility]

 

             Weight       2019 19:45:00                           [facility]

 

             Height       2019 19:45:00                           [facility]

 

             Temperature Oral (F) 2019 19:45:00 97.5 F                    

[facility]

 

             Diastolic (mm Hg) 2019 19:45:00                           [facility]

 

             Systolic (mm Hg) 2019 19:45:00                           [facility]

[facility]

 

             Weight       2019-10-02 18:30:00                           [facility]

 

             Heart Rate   2019-10-02 18:30:00                           [facility]

 

             Diastolic (mm Hg) 2019-10-02 18:30:00                           [facility]

[facility]

 

             Systolic (mm Hg) 2019-10-02 18:30:00                           [facility]

[facility]

 

             Weight       2019 21:00:00                           [facility]

 

             Height       2019 21:00:00                           [facility]

 

             Temperature Oral (F) 2019 21:00:00 98.2 F                    

[facility]

 

             Diastolic (mm Hg) 2019 21:00:00                           [facility]

 

             Systolic (mm Hg) 2019 21:00:00                           [facility]

[facility]

 

             Weight       2019 20:15:00                           [facility]

 

             Height       2019 20:15:00                           [facility]

 

             Temperature Oral (F) 2019 20:15:00 97.0 F                    

[facility]

 

             Diastolic (mm Hg) 2019 20:15:00                           [facility]

 

             Systolic (mm Hg) 2019 20:15:00                           [facility]

[facility]

 

             Weight       2019 15:15:00                           [facility]

 

             Heart Rate   2019 15:15:00                           [facility]

 [place]

 

             Diastolic (mm Hg) 2019 15:15:00                           [facility]

 

             Systolic (mm Hg) 2019 15:15:00                           [facility]

[facility]

 

             Weight       2018 15:45:00                           [facility]

 [facility]

 

             Height       2018 15:45:00                           [facility]

 

             Temperature Oral (F) 2018 15:45:00 102.1 F                   

[facility]

 

             Diastolic (mm Hg) 2018 15:45:00                           [facility]

[facility]

 

             Systolic (mm Hg) 2018 15:45:00                           [facility]

[facility]

 

             Weight       2018 20:00:00                           [facility]

 [place]

 

             Height       2018 20:00:00                           [facility]

 

             Temperature Oral (F) 2018 20:00:00 98.6 F                    

[facility]

 

             Diastolic (mm Hg) 2018 20:00:00                           [facility]

 

             Systolic (mm Hg) 2018 20:00:00                           [facility]

[facility]

 

             Weight       2018-10-23 16:00:00                           [facility]

 [facility]

 

             Heart Rate   2018-10-23 16:00:00                           [facility]

 [place]

 

             Diastolic (mm Hg) 2018-10-23 16:00:00                           [facility]

[facility]

 

             Systolic (mm Hg) 2018-10-23 16:00:00                           [facility]

[facility]

 

             Weight       2018-10-18 18:45:00                           [facility]

 [facility]

 

             Height       2018-10-18 18:45:00                           [facility]

 [place]

 

             Temperature Oral (F) 2018-10-18 18:45:00 98.7 F                    

[facility]

 

             Diastolic (mm Hg) 2018-10-18 18:45:00                           [facility]

[facility]

 

             Systolic (mm Hg) 2018-10-18 18:45:00                           [facility]

[facility] [place]

 

             Weight       2018 19:15:00                           [facility]

 [facility]

 

             Heart Rate   2018 19:15:00                           [facility]

 [place]

 

             Diastolic (mm Hg) 2018 19:15:00                           [facility]

[facility]

 

             Systolic (mm Hg) 2018 19:15:00                           [facility]

[facility]

 

             Weight       2018 21:00:00                           [facility]

 [facility]

 

             Heart Rate   2018 21:00:00                           [facility]

 [facility]

 

             Diastolic (mm Hg) 2018 21:00:00                           [facility]

[facility]

 

             Systolic (mm Hg) 2018 21:00:00                           [facility]

[facility]

 

             Weight       2018 18:15:00                           [facility]

 [place]

 

             Height       2018 18:15:00                           [facility]

 [facility]

 

             Temperature Oral (F) 2018 18:15:00 98.4 F                    

[facility]

 

             Diastolic (mm Hg) 2018 18:15:00                           [facility]

 

             Systolic (mm Hg) 2018 18:15:00                           [facility]

[facility] [place]

 

             Weight       2018 18:15:00                           [facility]

 [place]

 

             Heart Rate   2018 18:15:00                           [facility]

 [place]

 

             Diastolic (mm Hg) 2018 18:15:00                           [facility]

[facility]

 

             Systolic (mm Hg) 2018 18:15:00                           [facility]

[facility]

 

             Weight       2017 20:45:00                           [facility]

 [place]

 

             Height       2017 20:45:00                           [facility]

 [place]

 

             Temperature Oral (F) 2017 20:45:00 97.5 F                    

[facility]

 

             Diastolic (mm Hg) 2017 20:45:00                           [facility]

[facility]

 

             Systolic (mm Hg) 2017 20:45:00                           [facility]

[facility]

 

             Weight       2017-10-05 18:45:00                           [facility]

 [facility]

 

             Heart Rate   2017-10-05 18:45:00                           [facility]

 [facility]

 

             Diastolic (mm Hg) 2017-10-05 18:45:00                           [facility]

[facility]

 

             Systolic (mm Hg) 2017-10-05 18:45:00                           [facility]

[facility] [place]

 

             Weight       2017 15:45:00                           [facility]

 [place]

 

             Height       2017 15:45:00                           [facility]

 [place]

 

             Temperature Oral (F) 2017 15:45:00 98.3 F                    

[facility]

 

             Diastolic (mm Hg) 2017 15:45:00                           [facility]

[facility]

 

             Systolic (mm Hg) 2017 15:45:00                           [facility]

[facility] [place]

 

             Weight       2017 20:00:00                           [facility]

 [place]

 

             Heart Rate   2017 20:00:00                           [facility]

 [place]

 

             Diastolic (mm Hg) 2017 20:00:00                           [facility]

[facility]

 

             Systolic (mm Hg) 2017 20:00:00                           [facility]

[facility] [place]

 

             Weight       2016 22:02:00                           [facility]

 [facility]

 

             Height       2016 22:02:00                           [facility]

 [facility]

 

             Temperature Oral (F) 2016 22:02:00 99.1 F                    

[facility]

 

             Diastolic (mm Hg) 2016 22:02:00                           [facility]

[facility]

 

             Systolic (mm Hg) 2016 22:02:00                           [facility]

[facility]

 

             Weight       2016 19:15:00                           [facility]

 [place]

 

             Heart Rate   2016 19:15:00                           [facility]

 [place]

 

             Diastolic (mm Hg) 2016 19:15:00                           [facility]

[facility]

 

             Systolic (mm Hg) 2016 19:15:00                           [facility]

[facility] [place]

 

             Weight       2016 20:30:00                           [facility]

 [place]

 

             Heart Rate   2016 20:30:00                           [facility]

 [place]

 

             Diastolic (mm Hg) 2016 20:30:00                           [facility]

[facility]

 

             Systolic (mm Hg) 2016 20:30:00                           [facility]

[facility] [place]







Procedures





                Procedure       Date / Time Performed Performing Clinician [facility]

e

 

                EGD with biopsy 2018 00:00:00 CHRISTAL SALAS [facility]

 

                X-ray of chest, two views 2018 00:00:00 XAVI FRANKLIN CH

[facility]

 

                Computed tomography of chest with contrast 2018 00:00:00 MELANIE PUGH 

[facility]

 

                X-ray of chest, two views 2018 00:00:00 XAVI FRANKLIN CH

[facility]

 

                    Magnetic resonance imaging of brain without contrast 2018 00:00:00 AMY COATES                               [facility]







Plan of Care





             Planned Activity Planned Date Details      Comments     Source

 

             Future Scheduled Test 2013 15:34:07 Plan of Care [code = 1877

6-5]              

[facility]







Encounters





             Start Date/Time End Date/Time Encounter Type Admission Type Attendi

[facility]   Care Department Encounter ID    Source

 

           2020 15:15:00 2020 15:15:00 Outpatient                   

    Inpatient Providers [facility]               Inpatient [facility] [id number]              eClinicalWo

rks

 

           2020 15:03:00 2020 15:03:00 Outpatient                   

    Inpatient Providers [facility]               Inpatient [facility] [id number]              eClinicalWo

rks

 

           2020-11-10 14:30:00 2020-11-10 14:30:00 Outpatient                   

    Inpatient Providers of 

[place]               Inpatient Providers [facility] [id number]              eClinicalWo

rks

 

           2020-10-27 15:15:00 2020-10-27 15:15:00 Outpatient                   

    Inpatient Providers [facility] [id number]              eClinicalWo

rks

 

          2020-09-10 13:00:00 2020-09-10 13:00:00 Outpatient                    

 Koffi Vernon              [id number]                    eClinicalWorks

 

          2020 13:15:00 2020 13:15:00 Outpatient                    

 [facility]              [id number]                    eClinicalWorks

 

          2020 16:20:00 2020 16:20:00 Outpatient                    

 [facility]              [id number]                    eClinicalWorks

 

           2020 15:00:00 2020 15:00:00 Outpatient                   

    Inpatient Providers [facility] [id number]              eClinicalWo

rks

 

          2020 14:00:00 2020 14:00:00 Outpatient                    

 [facility]              [id number]                    eClinicalWorks

 

           2020 09:00:00 2020 09:00:00 Outpatient                   

    Inpatient Providers [facility] [id number]              eClinicalWo

rks

 

          2020 14:00:00 2020 14:00:00 Outpatient                    

 [facility]              [id number]                    eClinicalWorks

 

          2020 14:15:00 2020 14:15:00 Outpatient                    

 [facility]              [id number]                    eClinicalWorks

 

        2020 08:03:00 2020 08:03:00 Outpatient                 [facility]     [id number]    [facility]

 

          2020 14:15:00 2020 14:15:00 Outpatient                    

 Koffi Vernon              [id number]                    eClinicalWorks

 

           2019 13:45:00 2019 13:45:00 Outpatient                   

    Inpatient Providers of 

[place]               Inpatient Providers [facility] [id number]              eClinicalWo

rks

 

          2019-10-03 10:00:00 2019-10-03 10:00:00 Outpatient                    

 [facility]              [id number]                    eClinicalWorks

 

          2019-10-02 13:30:00 2019-10-02 13:30:00 Outpatient                    

 [facility]              [id number]                     eClinicalWorks

 

           2019 16:00:00 2019 16:00:00 Outpatient                   

    Inpatient Providers of 

[place]               Inpatient Providers [facility] [id number]              eClinicalWo

rks

 

           2019 15:15:00 2019 15:15:00 Outpatient                   

    Inpatient Providers of 

[facility] [id number]              eClinicalWo

rks

 

          2019 09:15:00 2019 09:15:00 Outpatient                    

 [facility]              [id number]                    eClinicalWorks

 

          2019 10:15:00 2019 10:15:00 Outpatient                    

 [facility]              [id number]                     eClinicalWorks

 

           2019-01-10 10:22:00 2019-01-10 10:22:00 Outpatient                   

    [facility] [id number]               eClinicalWor

ks

 

           2018 12:15:00 2018 19:21:00 Discharged Inpatient 1       

   IRENEEVETTE 

[facility]              [id number]        [facility]

 

           2018 09:45:00 2018 09:45:00 Outpatient                   

    Inpatient Providers of 

[place]               Inpatient Providers [facility] [id number]              eClinicalWo

rks

 

           2018 14:00:00 2018 14:00:00 Outpatient                   

    Inpatient Providers of 

[place]               Inpatient Providers [facility] [id number]              eClinicalWo

rks

 

          2018-10-23 11:00:00 2018-10-23 11:00:00 Outpatient                    

 [facility]              [id number]                     eClinicalWorks

 

           2018-10-18 13:45:00 2018-10-18 13:45:00 Outpatient                   

    Inpatient Providers of 

[place]               Inpatient Providers [facility] [id number]              eClinicalWo

rks

 

          2018-10-09 10:00:00 2018-10-09 10:00:00 Outpatient                    

 Koffi Vernon              [id number]                     eClinicalWorks

 

          2018 14:15:00 2018 14:15:00 Outpatient                    

 [facility]              [id number]                     eClinicalWorks

 

          2018 16:00:00 2018 16:00:00 Outpatient                    

 [facility]              [id number]                     eClinicalWorks

 

           2018 13:15:00 2018 13:15:00 Outpatient                   

    Inpatient Providers [facility] [id number]              eClinicalWo

rks

 

          2018 13:15:00 2018 13:15:00 Outpatient                    

 [facility]              [id number]                     eClinicalWorks

 

          2018 11:47:00 2018 11:47:00 Outpatient                    

 [facility]              [id number]                     eClinicalWorks

 

             2018 20:20:00 2018 15:17:00 Discharged Inpatient AMY HUTCHISON

                [facility]          [id number]    [facility]

 

           2017 14:45:00 2017 14:45:00 Outpatient                   

    Inpatient Providers of 

[facility] Providers [facility] [id number]              eClinicalWo

rks

 

          2017-10-17 11:00:00 2017-10-17 11:00:00 Outpatient                    

 [facility]              [id number]                     eClinicalWorks

 

          2017-10-05 13:45:00 2017-10-05 13:45:00 Outpatient                    

 [facility]              [id number]                     eClinicalWorks

 

           2017 10:45:00 2017 10:45:00 Outpatient                   

    Inpatient Providers [facility] [id number]               eClinicalWo

rks

 

          2017 14:00:00 2017 14:00:00 Outpatient                    

 [facility] [place] 

[place]      [id number]                     eClinicalWorks

 

          2016 16:02:00 2016 16:02:00 Outpatient                    

 Vivian Cedillo MD, [place] 

[place]      [id number]                     eClinicalWorks

 

          2016 15:00:00 2016 15:00:00 Outpatient                    

 [place]i [place]      [id number]                     [facility]ClinicalWorks

 

          2016 14:15:00 2016 14:15:00 Outpatient                    

 [facility]i [facility] [place] 

[place]      [id number]                     [facility]ClinicalWorks

 

          2016 14:30:00 2016 14:30:00 Outpatient                    

 Koffi [place]      [id number]                     eClinicalWorks

 

        2014 17:03:05 2014 17:03:04 Outpatient                 [facility] 

   [facility]    [id number]  

 

        2014 15:22:58 2014 15:22:57 Outpatient                 [facility] 

   [facility]    [id number]  

 

        2013 11:33:13 2013 11:33:13 Outpatient                 [facility] 

   [facility]    [id number]  

 

        2013 07:00:13 2013 07:00:13 Outpatient                 [facility] 

   [facility]    [id number]  

 

        2013 10:34:08 2013 10:34:07 Outpatient                 [facility] 

   [facility]    [id number]  

 

        2013 06:30:15 2013 06:30:14 Outpatient                 [facility] 

   [facility]    [id number]  

 

        2013 04:14:03 2013 04:13:44 Outpatient                 [facility] 

   [facility]    [id number]  

 

        2013 23:46:52 2013 23:46:23 Outpatient                 [facility] 

   [facility]    [id number]  







Results





           Test Description Test Time  Test Comments Results    Result Comments 

Source

 

                    Bedside Glucose     2018 17:35:00   

 

                                        Test Item

 

             Bedside Glucose (test code = 93483-3) 257                 H  

           





Meter ID: [id number] [facility]
2018 16:41:00* 



             Test Item    Value        Reference Range Interpretation Comments

 

             Magnesium Level (test code = 91827-9) 1.6          1.3-2.1         

           





[facility] Hospital[facility]comycin Level [id number] 
11:10:00* 



             Test Item    Value        Reference Range Interpretation Comments

 

             Vancomycin Level Trough (test code = 4092-3) 6.7          5.0-10.0 

                  





[facility] HospitalCHEST 2 [id number] 06:03:00         
                                                                            [facility]                        [address] [address]      Patient Name: ANDREW DONALDSON JR        
                          MR #: [id number]                     : 1945  
                                Age/Sex: 73/M  Acct #: [id number]             
                Req #: [id number]  Adm Physician: EVETTE BONILLA MD               
                      Ordered by: XAVI FRANKLIN MD                            
Report #: [id number]        Location: [facility]                                    
Room/Bed: [address] [place] [address]          _______________________________________________
____________________________________________________    Procedure: [id number] DX/
CHEST 2 VIEWS  Exam Date: 18                            Exam Time: 0545   
                                          REPORT STATUS: Signed    CHEST 2 VIEW
S,          Technique: CHEST 2 VIEWS   Comparison: 2018   Clinical history
:  Follow-up CHF       DISCUSSION:   Limited by soft tissue attenuation. Contras
t is noted in the bowel in the upper   abdomen.      IMPRESSION:   1. Stable mil
dly enlarged cardiomediastinal silhouette.   2. Low lung volumes with bibasilar 
vascular crowding/atelectasis.   3. No effusion or pneumothorax.      Signed by:
Dr Tutu Mcqueen MD on 2018 6:09 AM        Dictated By: TUTU MCQUEEN MD  Electronically Signed By: TUTU MCQUEEN MD on 18  Transcribed B
y: NGUYEN on 18       COPY TO:   XAVI FRANKLIN MD        Sodium Level
2018 05:41:00* 



             Test Item    Value        Reference Range Interpretation Comments

 

             Sodium Level (test code = 2951-2) 138          136-145             

       





[facility] HospitalPotassium [id number] 05:41:00* 



             Test Item    Value        Reference Range Interpretation Comments

 

             Potassium Level (test code = 2823-3) 3.7          3.5-5.1          

          





[facility] HospitalChloride [id number] 05:41:00* 



             Test Item    Value        Reference Range Interpretation Comments

 

             Chloride Level (test code = 2075-0) 96                  L    

         





[facility] HospitalCarbon Dioxide [id number] 05:41:00* 



             Test Item    Value        Reference Range Interpretation Comments

 

             Carbon Dioxide Level (test code = 2028-9) 31           22-29       

 H             





[facility] HospitalAnion [id number] 05:41:00* 



             Test Item    Value        Reference Range Interpretation Comments

 

             Anion Gap (test code = 33037-3) 14.7         8-16                  

     





[facility] HospitalBlood Urea [id number] 05:41:00* 



             Test Item    Value        Reference Range Interpretation Comments

 

             Blood Urea Nitrogen (test code = 3094-0) 20           7-26         

              





[facility] HospitalCreatinine2018-12-20 05:41:00* 



             Test Item    Value        Reference Range Interpretation Comments

 

             Creatinine (test code = 2160-0) 0.85         0.72-1.25             

     





[facility] HospitalBUN/Creatinine [id number] 05:41:00* 



             Test Item    Value        Reference Range Interpretation Comments

 

             BUN/Creatinine Ratio (test code = 3097-3) 24           6-25        

               





[facility] HospitalEstimat Glomerular Filtration Rate
2018 05:41:00* 



             Test Item    Value        Reference Range Interpretation Comments

 

             Estimat Glomerular Filtration Rate (test code = 674223696) > 60    

     >60                        





Ranges were taken from the National Kidney Disease Education Program and the [facility] literature.Reference ranges:60 or greater: Weqkla64-59 (
for 3 consecutive months): Chronic kidney disease 15 or less: Kidney failureNorth Central Surgical Center HospitalGlucose Shlpy9794-97-14 05:41:00* 



             Test Item    Value        Reference Range Interpretation Comments

 

             Glucose Level (test code = YYQ1979) 207                 H    

         





[facility] HospitalCalcium Dkzjh6962-26-95 05:41:00* 



             Test Item    Value        Reference Range Interpretation Comments

 

             Calcium Level (test code = 17496-5) 9.1          8.4-10.2          

         





[facility] HospitalWhite Blood Idbfi7650-28-93 05:12:00* 



             Test Item    Value        Reference Range Interpretation Comments

 

             White Blood Count (test code = 6690-2) 10.63        4.8-10.8       

            





[facility] HospitalRed Blood [id number] 05:12:00* 



             Test Item    Value        Reference Range Interpretation Comments

 

             Red Blood Count (test code = 789-8) 4.15         4.3-5.7      L    

         





[facility] HospitalHemoglobin2018-12-20 05:12:00* 



             Test Item    Value        Reference Range Interpretation Comments

 

             Hemoglobin (test code = 64049-5) 13.4         14.0-18.0    L       

      





[facility] HospitalHematocrit2018-12-20 05:12:00* 



             Test Item    Value        Reference Range Interpretation Comments

 

             Hematocrit (test code = 4544-3) 41.6         38.2-49.6             

     





[facility] HospitalMean Corpuscular [id number] 
05:12:00* 



             Test Item    Value        Reference Range Interpretation Comments

 

             Mean Corpuscular Volume (test code = 787-2) 100.2        81-99     

   H             





[facility] HospitalMean Corpuscular Rkgijsgbkd3295-56-52 
05:12:00* 



             Test Item    Value        Reference Range Interpretation Comments

 

             Mean Corpuscular Hemoglobin (test code = 785-6) 32.3         28-32 

       H             





[facility] HospitalMean Corpuscular Hemoglobin Concent
2018 05:12:00* 



             Test Item    Value        Reference Range Interpretation Comments

 

             Mean Corpuscular Hemoglobin Concent (test code = 786-4) 32.2       

  31-35                      





[facility] HospitalRed Cell Distribution [id number] 
05:12:00* 



             Test Item    Value        Reference Range Interpretation Comments

 

             Red Cell Distribution Width (test code = 88699-0) 13.7         11.7

-14.4                  





[facility] HospitalPlatelet [id number] 05:12:00* 



             Test Item    Value        Reference Range Interpretation Comments

 

             Platelet Count (test code = 777-3) 265          140-360            

        





[facility] HospitalNeutrophils (%) (Auto)2018 05:12:00
  * 



             Test Item    Value        Reference Range Interpretation Comments

 

             Neutrophils (%) (Auto) (test code = 52509-3) 76.9         38.7-80.0

                  





[facility] HospitalLymphocytes (%) (Auto)2018 05:12:00
  * 



             Test Item    Value        Reference Range Interpretation Comments

 

             Lymphocytes (%) (Auto) (test code = 736-9) 11.8         18.0-39.1  

  L             





[facility] HospitalMonocytes (%) (Auto)2018 05:12:00* 



             Test Item    Value        Reference Range Interpretation Comments

 

             Monocytes (%) (Auto) (test code = 5905-5) 8.9          4.4-11.3    

               





[facility] HospitalEosinophils (%) (Auto)2018 05:12:00
  * 



             Test Item    Value        Reference Range Interpretation Comments

 

             Eosinophils (%) (Auto) (test code = 713-8) 1.3          0.0-6.0    

                





[facility] HospitalBasophils (%) (Auto)2018 05:12:00* 



             Test Item    Value        Reference Range Interpretation Comments

 

             Basophils (%) (Auto) (test code = 706-2) 0.4          0.0-1.0      

              





[facility] HospitalIM GRANULOCYTES %2018 05:12:00* 



             Test Item    Value        Reference Range Interpretation Comments

 

             IM GRANULOCYTES % (test code = IM GRANULOCYTES %) 0.7          0.0-

1.0                    





[facility] HospitalNeutrophils # (Auto)2018 05:12:00* 



             Test Item    Value        Reference Range Interpretation Comments

 

             Neutrophils # (Auto) (test code = 751-8) 8.2          2.1-6.9      

H             





[facility] HospitalLymphocytes # (Auto)2018 05:12:00* 



             Test Item    Value        Reference Range Interpretation Comments

 

             Lymphocytes # (Auto) (test code = 97764-6) 1.3          1.0-3.2    

                





[facility] HospitalMonocytes # (Auto)2018 05:12:00* 



             Test Item    Value        Reference Range Interpretation Comments

 

             Monocytes # (Auto) (test code = 742-7) 1.0          0.2-0.8      H 

            





[facility] HospitalEosinophils # (Auto)2018 05:12:00* 



             Test Item    Value        Reference Range Interpretation Comments

 

             Eosinophils # (Auto) (test code = 711-2) 0.1          0.0-0.4      

              





[facility] HospitalBasophils # (Auto)2018 05:12:00* 



             Test Item    Value        Reference Range Interpretation Comments

 

             Basophils # (Auto) (test code = 704-7) 0.0          0.0-0.1        

            





[facility] HospitalAbsolute Immature Granulocyte (auto
2018 05:12:00* 



             Test Item    Value        Reference Range Interpretation Comments

 

                                        Absolute Immature Granulocyte (auto (mary

t code = Absolute Immature Granulocyte 

(auto)          0.07            0-0.1                            





[facility] Hospitalputum Ujnjrxy5668-99-48 07:56:00* 



             Test Item    Value        Reference Range Interpretation Comments

 

             Sputum Culture (test code = 624-7) Organism: CANDIDA ALBICANS      

                      





[facility] HospitalBlood Lcqomaf6872-05-88 11:21:00* 



             Test Item    Value        Reference Range Interpretation Comments

 

             Blood Culture (test code = 36848117) NO GROWTH AFTER 5 DAYS, FINAL 

REPORT                            





[facility] HospitalHomocysteine2018-12-18 11:19:00* 



             Test Item    Value        Reference Range Interpretation Comments

 

             Homocysteine (test code = 69289-1) 11.3         0.0-15.0           

        





Performed at:  [facility]  [id number]
Director: Bowen Hansen MD, Phone:  [phone] Central Surgical Center HospitalBARIUM HGZPDJU2850-10-62 17:00:00                                         
                                            [place] [address]   
  Patient Name: ANDREW DONALDSON JR                                   MR #: 
[id number]                     : 1945                                  
Age/Sex: 73/M  Acct #: [id number]                              Req #: [id number]
[id number]  Adm Physician: EVETTE BONILLA MD                                      
Ordered by: CHRISTAL SALAS MD                            Report #: [id number]   
    Location: [facility]                                    Room/Bed: [place] [address]      
   ____________________________________________
_______________________________________________________    Procedure: [id number] 
DX/BARIUM SWALLOW  Exam Date: 18                            Exam Time: 161
5                                              REPORT STATUS: Signed    PROCEDUR
E:   BARIUM SWALLOW was performed with Sodium Carbonate and Barium.        OPERA
TORS:         COMPARISON: CT scan of the chest dated 2018       INDICATION
S: Esophageal wall thickening       FINDINGS:  The swallowing mechanism was terri
sly normal. The esophagus    was normally distensible and the mucosa was within 
normal limits.     Esophageal motility was within normal limits. No intrinsic or
extrinsic    abnormality involving the esophagus. No mucosal abnormality or    
intraluminal mass. There is a small hiatal hernia present. No reflux    identifi
ed.       The visualized portion of the stomach and proximal small bowel are    
unremarkable.       Fluoroscopy time: 1.3 minutes.   Total dose: 54.66 mGy      
    IMPRESSION:       1. Small hiatal hernia.   2. No esophageal mass.        C
daniele Jeong D.O.     Dictated by:  Chon Jeong D.O. on 2018 at 17:00 
      Electronically approved by:  Chon Jeong D.O. on 2018 at 17:00   
               Dictated By: CHON JEONG DO  Electronically Signed By: CHON JEONG DO on 18  Transcribed By: LINDSAY on 18       COPY T
O:   CHRISTAL SALAS MD        Vitamin B12 Cqmkd6753-11-26 11:44:00* 



             Test Item    Value        Reference Range Interpretation Comments

 

             Vitamin B12 Level (test code = 41791-7) 1225         213-816      H

             





[facility] HospitalFolate2018-12-17 11:44:00* 



             Test Item    Value        Reference Range Interpretation Comments

 

             Folate (test code = 2284-8) 16.4         7.0-15.4     H            

 





[facility] HospitalFree Thyroxine Aqacr9262-48-67 05:15:00* 



             Test Item    Value        Reference Range Interpretation Comments

 

             Free Thyroxine Index (test code = 83734-6) 3.9694       1.4-3.8    

  H             





[facility] HospitalThyroxine (T4)2018 05:15:00* 



             Test Item    Value        Reference Range Interpretation Comments

 

             Thyroxine (T4) (test code = 3026-2) 9.51         4.5-10.9          

         





[facility] HospitalTriiodothyronine (T3) Aaokgx3435-35-59 
05:15:00* 



             Test Item    Value        Reference Range Interpretation Comments

 

             Triiodothyronine (T3) Uptake (test code = 3050-2) 41.74        22.5

-37.0    H             





[facility] HospitalThyroid Stimulating Hormone (TSH)
2018 05:15:00* 



             Test Item    Value        Reference Range Interpretation Comments

 

             Thyroid Stimulating Hormone (TSH) (test code = 40568-1) 0.031      

  0.350-4.940  L             





[facility] HospitalHemoglobin A1c Percent2018-12-15 07:40:00
  * 



             Test Item    Value        Reference Range Interpretation Comments

 

             Hemoglobin A1c Percent (test code = Hemoglobin A1c Percent) 7.2    

      4.0-7.0      H             





[facility] HospitalDifferential Total Cells Counted
2018 09:55:00* 



             Test Item    Value        Reference Range Interpretation Comments

 

                          Differential Total Cells Counted (test code = Differen

tial Total Cells Counted) 

100                                                          





[facility] HospitalNeutrophils % (Manual)2018 09:55:00
  * 



             Test Item    Value        Reference Range Interpretation Comments

 

             Neutrophils % (Manual) (test code = 96996-4) 82           40-74    

    H             





[facility] HospitalLymphocytes % (Manual)2018 09:55:00
  * 



             Test Item    Value        Reference Range Interpretation Comments

 

             Lymphocytes % (Manual) (test code = 737-7) 7            19-48      

  L             





[facility] HospitalMonocytes % (Manual)2018 09:55:00* 



             Test Item    Value        Reference Range Interpretation Comments

 

             Monocytes % (Manual) (test code = 744-3) 7            3.4-9.0      

              





[facility] HospitalMyelocytes %2018 09:55:00* 



             Test Item    Value        Reference Range Interpretation Comments

 

             Myelocytes % (test code = 749-2) 1            0-0          H       

      





[facility] HospitalReactive Qbnqpkrhyls7453-29-46 09:55:00* 



             Test Item    Value        Reference Range Interpretation Comments

 

             Reactive Lymphocytes (test code = 82877-7) 3                       

                





[facility] HospitalNucleated Red Blood Ppghz5850-93-45 
09:55:00* 



             Test Item    Value        Reference Range Interpretation Comments

 

             Nucleated Red Blood Cells (test code = 40929-5) 2                  

                     





[facility] HospitalPlatelet Hhnuhaho4599-42-58 09:55:00* 



             Test Item    Value        Reference Range Interpretation Comments

 

             Platelet Estimate (test code = 62024-6) ADEQUATE                   

             





[facility] HospitalPlatelet Morphology Ymgkgav8328-31-91 
09:55:00* 



             Test Item    Value        Reference Range Interpretation Comments

 

             Platelet Morphology Comment (test code = 49331-2) NORMAL           

                       





[facility] HospitalHypochromasia2018-12-14 09:55:00* 



             Test Item    Value        Reference Range Interpretation Comments

 

             Hypochromasia (test code = 728-6) SLIGHT                           

       





[facility] HospitalAnisocytosis2018-12-14 09:55:00* 



             Test Item    Value        Reference Range Interpretation Comments

 

             Anisocytosis (test code = 702-1) SLIGHT                            

      





[facility] HospitalRed Cell Morphology Zxpgqak0868-96-98 
09:55:00* 



             Test Item    Value        Reference Range Interpretation Comments

 

             Red Cell Morphology Comment (test code = 6742-1) NORMAL            

                      





[facility] HospitalCreatine Kinase FY0750-06-90 09:22:00* 



             Test Item    Value        Reference Range Interpretation Comments

 

             Creatine Kinase MB (test code = 33113-4) 9.50         0-5.0        

H             





[facility] HospitalTroponin -56-96 09:22:00* 



             Test Item    Value        Reference Range Interpretation Comments

 

             Troponin I (test code = VWW2181) 0.054        0-0.300              

      





[facility] HospitalCreatine Gqidld9008-80-52 09:18:00* 



             Test Item    Value        Reference Range Interpretation Comments

 

             Creatine Kinase (test code = 2157-6) 497                 H   

          





[facility] HospitalArterial Blood eV6163-79-11 08:07:00* 



             Test Item    Value        Reference Range Interpretation Comments

 

             Arterial Blood pH (test code = 2744-1) 7.31         7.31-7.41      

            





[facility] HospitalArterial Blood Partial Pressure CO2
2018 08:07:00* 



             Test Item    Value        Reference Range Interpretation Comments

 

             Arterial Blood Partial Pressure CO2 (test code = 2019-8) 54        

   41-51        H             





[facility] HospitalArterial Blood Partial Pressure O2
2018 08:07:00* 



             Test Item    Value        Reference Range Interpretation Comments

 

             Arterial Blood Partial Pressure O2 (test code = 2019-8) 114        

         H             





[facility] HospitalArterial Blood IZI42539-28-14 08:07:00* 



             Test Item    Value        Reference Range Interpretation Comments

 

             Arterial Blood HCO3 (test code = 1960-4) 27           23-28        

              





[facility] HospitalArterial Blood Base Cwbqfi9716-05-85 
08:07:00* 



             Test Item    Value        Reference Range Interpretation Comments

 

             Arterial Blood Base Excess (test code = 1925-7) 1.0          -2-3  

                     





[facility] HospitalArterial Blood Oxygen Saturation
2018 08:07:00* 



             Test Item    Value        Reference Range Interpretation Comments

 

             Arterial Blood Oxygen Saturation (test code = 2708-6) 98.0         

95-98                      





[facility] HospitalFiO22018-12-14 08:07:00* 



             Test Item    Value        Reference Range Interpretation Comments

 

             FiO2 (test code = FiO2) 50                                      





PT ON BIPAP AT 14/6,R14,50%DTR1MEENorth [facility] Surgical Center HospitalUrine WBC
2018 14:17:00* 



             Test Item    Value        Reference Range Interpretation Comments

 

             Urine WBC (test code = 5821-4) NONE         0-5                    

    





[facility] HospitalUrine XLR4626-95-26 14:17:00* 



             Test Item    Value        Reference Range Interpretation Comments

 

             Urine RBC (test code = 22537-0) 0-5          0-5                   

     





[facility] HospitalUrine Nfkywwnl7076-46-26 14:17:00* 



             Test Item    Value        Reference Range Interpretation Comments

 

             Urine Bacteria (test code = 15342-5) MODERATE     NONE         H   

          





[facility] HospitalUrine Epithelial Hqogo6235-40-26 14:17:00
  * 



             Test Item    Value        Reference Range Interpretation Comments

 

             Urine Epithelial Cells (test code = 41456-7) FEW          NONE     

                  





[facility] HospitalUrine Hyaline Enhkw0186-08-63 14:17:00* 



             Test Item    Value        Reference Range Interpretation Comments

 

             Urine Hyaline Casts (test code = 18740-9) 6-10         0-1         

 H             





[facility] HospitalUrine Bskva9365-67-82 14:07:00* 



             Test Item    Value        Reference Range Interpretation Comments

 

             Urine Color (test code = 5778-6) YELLOW       YELLOW               

      





[facility] HospitalUrine Gbruqra1556-86-45 14:07:00* 



             Test Item    Value        Reference Range Interpretation Comments

 

             Urine Clarity (test code = 37936-7) SL CLOUDY    CLEAR        H    

         





[facility] HospitalUrine Specific Racliss4968-40-73 14:07:00
  * 



             Test Item    Value        Reference Range Interpretation Comments

 

             Urine Specific Gravity (test code = 5811-5) 1.010        1.010-1.02

5                





[facility] HospitalUrine gC1609-48-51 14:07:00* 



             Test Item    Value        Reference Range Interpretation Comments

 

             Urine pH (test code = 78669-9) 6            5-7                    

    





[facility] HospitalUrine Leukocyte Fimblvwb2938-20-84 
14:07:00* 



             Test Item    Value        Reference Range Interpretation Comments

 

             Urine Leukocyte Esterase (test code = 5799-2) NEGATIVE     NEGATIVE

                   





[facility] HospitalUrine Gotrxlw3357-20-91 14:07:00* 



             Test Item    Value        Reference Range Interpretation Comments

 

             Urine Nitrite (test code = 46941-0) NEGATIVE     NEGATIVE          

         





[facility] HospitalUrine Lbxkrrc6528-00-97 14:07:00* 



             Test Item    Value        Reference Range Interpretation Comments

 

             Urine Protein (test code = 5804-0) NEGATIVE     NEGATIVE           

        





[facility] HospitalUrine Glucose (UA)2018 14:07:00* 



             Test Item    Value        Reference Range Interpretation Comments

 

             Urine Glucose (UA) (test code = 2349-9) 2+           NEGATIVE     H

             





[facility] HospitalUrine Izgckyx3832-69-44 14:07:00* 



             Test Item    Value        Reference Range Interpretation Comments

 

             Urine Ketones (test code = 79064-4) 1+           NEGATIVE     H    

         





[facility] HospitalUrine Sufijbrsdgjq2036-00-44 14:07:00* 



             Test Item    Value        Reference Range Interpretation Comments

 

             Urine Urobilinogen (test code = 11891-6) 0.2          0.2-1        

              





[facility] HospitalUrine Bmtbmjprr8065-71-93 14:07:00* 



             Test Item    Value        Reference Range Interpretation Comments

 

             Urine Bilirubin (test code = 1978-6) NEGATIVE     NEGATIVE         

          





[facility] HospitalUrine Lvyxc7045-92-45 14:07:00* 



             Test Item    Value        Reference Range Interpretation Comments

 

             Urine Blood (test code = 76673-1) 1+           NEGATIVE     H      

       





[facility] HospitalCT CHEST -57-69 12:56:00            
                                                                         [place] [address]      Patient Name: ANDREW DONALDSON JR        
                          MR #: [id number]                     : 1945  
                                Age/Sex: 73/M  Acct #: [id number]             
                Req #: [id number]  Adm Physician: EVETTE BONILLA MD               
                      Ordered by: MELANIE HERNANDEZ MD                           
Report #: [id number]        Location: [facility]                                  
Room/Bed: [place] [address]            ___________________________________________
________________________________________________________    Procedure: [id number]
CT/CT CHEST W  Exam Date: 18                            Exam Time: 1235   
                                          REPORT STATUS: Signed    EXAMINATION: 
CT of the chest with contrast, PE protocol.      TECHNIQUE:  Spiral CT images of
the chest were performed from the lung apices   through the level of the adrenal
glands after the IV administration of 100 cc   of Isovue-370. Thin section r
econstructions were obtained with special   concentration on the pulmonary arter
ies.      COMPARISON: Chest radiograph same day      CLINICAL HISTORY:Shortness 
of breath, concern for pulmonary embolus           DISCUSSION:      Vasculature:
The main pulmonary artery, right and left pulmonary arteries, and   their visua
lized lobar and segmental pulmonary arteries are patent, without   filling defec
t. The pulmonary outflow tract is of normal caliber. No ectasia or   aneurysmal 
dilatation of the thoracic aorta. Atherosclerotic calcification of   the thoraci
c aorta and great vessel origins, with great vessel origin   tortuosity. Native 
coronary artery calcifications.      Lungs:  Groundglass and reticular opacities
in the dependent lower lobes left   greater than right, compatible with subsegm
ental atelectasis. Scattered left   upper lobe groundglass opacities for example
on series 3 image 47. Linear   opacity medially within the right middle lobe and
in the inferior lingula,   compatible with subsegmental atelectasis.      Airw
ays:  Trachea, mainstem bronchi, and central lobar and segmental bronchi   are p
atent.      Pleura:  <There is no evidence of pleural effusion or pneumothorax.>
     Heart and mediastinum:  No hilar, mediastinal, or axillary lymphadenopathy.
  There is concentric wall thickening of the esophagus at the thoracic inlet,   
exemplified on series 2 image 23. Normal heart size.      Abdomen:  Visualized 
portions of the liver, gallbladder, spleen, pancreas,   adrenals are unremarkab
le. Left upper pole renal cyst partially visualized.      Bones and soft tissues
:  No focal soft tissue abnormalities. No osseous   destructive lesions.      IM
PRESSION:    No CT evidence of pulmonary embolus to the level of the segmental b
ranch   pulmonary arteries.      Groundglass opacities in the left upper and low
er lobes may indicate   atelectasis, aspiration pneumonitis, or atypical infecti
on in the appropriate   clinical setting.      Questionable esophageal wall thic
kening at the thoracic inlet may relate to   peristalsis. Correlate for symptoms
of dysphagia. Gastroenterology consultation   for potential upper endoscopy is 
suggested to exclude presence of a mass   lesion.      Atherosclerotic vascular 
disease.               Signed by: Dr. Xavi Kohler M.D. on 2018 1:07 PM 
      Dictated By: XAVI KOHLER MD  Electronically Signed By: XAVI KOHLER MD 
on 18 1307  Transcribed By: NGUYEN on 18 1307       COPY TO:   MELANIE TUTTLE MD        Band Neutrophils %2018 12:46:00* 



             Test Item    Value        Reference Range Interpretation Comments

 

             Band Neutrophils % (test code = 764-1) 1                           

            





[facility] HospitalPolychromasia2018-12-13 12:46:00* 



             Test Item    Value        Reference Range Interpretation Comments

 

             Polychromasia (test code = 83005-0) FEW                            

         





[facility] HospitalB-Type Natriuretic Avtookt5725-71-30 
11:55:00* 



             Test Item    Value        Reference Range Interpretation Comments

 

             B-Type Natriuretic Peptide (test code = 32901-1) 338.7        0-100

        H             





[facility] HospitalProthrombin Irbk6142-95-06 11:53:00* 



             Test Item    Value        Reference Range Interpretation Comments

 

             Prothrombin Time (test code = 5902-2) 21.9         11.9-14.5    H  

           





[facility] HospitalProthromb Time International Ratio
2018 11:53:00* 



             Test Item    Value        Reference Range Interpretation Comments

 

             Prothromb Time International Ratio (test code = 6301-6) 1.76       

                             





Oral Anticoagulant Therapy INR Values:1. Low Intensity Therapy        1.5 - 2.02
. Moderate Intensity Therapy   2.0 - 3.03. High Intensity Therapy(1)    2.5 - 3.
54. High Intensity Therapy(2)    3.0 - 4.05. Panic Value INR              > 5.0
[facility] HospitalActivated Partial Thromboplast Time
2018 11:53:00* 



             Test Item    Value        Reference Range Interpretation Comments

 

             Activated Partial Thromboplast Time (test code = 27447-7) 30.9     

    23.8-35.5                  





[facility] HospitalTotal Kybxvkxge5256-30-74 11:52:00* 



             Test Item    Value        Reference Range Interpretation Comments

 

             Total Bilirubin (test code = 1975-2) 1.8          0.2-1.2      H   

          





[facility] HospitalAspartate Amino Transf (AST/SGOT)
2018 11:52:00* 



             Test Item    Value        Reference Range Interpretation Comments

 

                                        Aspartate Amino Transf (AST/SGOT) (test 

code = Aspartate Amino Transf 

(AST/SGOT))     45              5-34            H                





[facility] HospitalAlanine Aminotransferase (ALT/SGPT)
2018 11:52:00* 



             Test Item    Value        Reference Range Interpretation Comments

 

             Alanine Aminotransferase (ALT/SGPT) (test code = 1742-6) 60        

   0-55         H             





[facility] HospitalTotal Otibkkd0656-49-53 11:52:00* 



             Test Item    Value        Reference Range Interpretation Comments

 

             Total Protein (test code = 2885-2) 7.4          6.5-8.1            

        





[facility] HospitalAlbumin2018-12-13 11:52:00* 



             Test Item    Value        Reference Range Interpretation Comments

 

             Albumin (test code = 1751-7) 2.6          3.5-5.0      L           

  





[facility] HospitalGlobulin2018-12-13 11:52:00* 



             Test Item    Value        Reference Range Interpretation Comments

 

             Globulin (test code = 58337-4) 4.8          2.3-3.5      H         

    





[facility] HospitalAlbumin/Globulin Kykvb9840-67-52 11:52:00
  * 



             Test Item    Value        Reference Range Interpretation Comments

 

             Albumin/Globulin Ratio (test code = 1759-0) 0.5          0.8-2.0   

   L             





[facility] HospitalAlkaline Kvjohxbhcbz9450-37-95 11:52:00* 



             Test Item    Value        Reference Range Interpretation Comments

 

             Alkaline Phosphatase (test code = 6768-6) 83                 

               





[facility] HospitalLactic Acid Rlukc6413-76-87 11:40:00* 



             Test Item    Value        Reference Range Interpretation Comments

 

             Lactic Acid Level (test code = Lactic Acid Level) 21.1         4.5-

19.8                 





NOTIFIED DIANNA HYDE 1140 on 18 by STEPHANIE ALFARO [facility] 11:40:00                        
                                                             [facility]                        [address] [place] [address]      Patient Name: ANDREW DONALDSON JR                      
            MR #: [id number]                     : 1945                
                  Age/Sex: 73/M  Acct #: [id number]                           
  Req #: [id number]  Adm Physician:                                             
        Ordered by: ARASELI HYDE NP                            Report #: 
[id number]        Location: ER                                      Room/Bed:    
                __________________________________________
_________________________________________________________    Procedure: [id number]
0 DX/CHEST SINGLE (PORTABLE)  Exam Date:                             Exam Time: 
                                             REPORT STATUS: Signed    Examinati
on: Single AP view of the chest.      COMPARISON: 3/4/2018      INDICATION: Shor
t of breath           DISCUSSION:      Lungs are well-inflated. No consolidation
, pleural effusion, or pneumothorax.   Tortuosity of the thoracic aorta with oth
erwise normal cardiomediastinal   contour for portable, AP technique.      No ac
[place] osseous abnormality.      IMPRESSION:       1.   No acute cardiopulmonary ab
normalities.      Signed by: Dr. Xavi Kohler M.D. on 2018 11:41 AM     
  Dictated By: XAVI KOHLER MD  Electronically Signed By: XAVI KOHLER MD on 
18 1141  Transcribed By: NGUYEN on 18 1141       COPY TO:   ARASELI HYDE NP        Bedside Mmmlzoi6304-41-99 12:03:00* 



             Test Item    Value        Reference Range Interpretation Comments

 

             Bedside Glucose (test code = 44113-0) 160                 H  

           





Meter ID: [id number] [facility]
2018 08:06:00* 



             Test Item    Value        Reference Range Interpretation Comments

 

             Urine Culture (test code = 630-4) Organism: ESCHERICHIA COLI       

                     





[facility] HospitalUrine Etfhjfq3332-04-26 08:06:00* 



             Test Item    Value        Reference Range Interpretation Comments

 

             Urine Culture (test code = 630-4) Organism: ESCHERICHIA COLI       

                     





[facility] HospitalB-Type Natriuretic Eolyyks1420-25-84 
11:25:00* 



             Test Item    Value        Reference Range Interpretation Comments

 

             B-Type Natriuretic Peptide (test code = 34652-4) 115.9        0-100

        H             





[facility] HospitalD-Dimer Quantitative (PE/DVT)2018 
11:09:00* 



             Test Item    Value        Reference Range Interpretation Comments

 

             D-Dimer Quantitative (PE/DVT) (test code = 53992-4) 0.48         0.

00-0.45    H             





[facility] HospitalD-Dimer Quantitative (PE/DVT)2018 
11:09:00* 



             Test Item    Value        Reference Range Interpretation Comments

 

             D-Dimer Quantitative (PE/DVT) (test code = 48552-0) 0.48         0.

00-0.45    H             





[facility] Hospitalodium Yswmh8087-30-53 06:58:00* 



             Test Item    Value        Reference Range Interpretation Comments

 

             Sodium Level (test code = 2951-2) 140          136-145             

       





[facility] HospitalPotassium Vouww3340-99-59 06:58:00* 



             Test Item    Value        Reference Range Interpretation Comments

 

             Potassium Level (test code = 2823-3) 4.5          3.5-5.1          

          





[facility] HospitalChloride Heyte9974-38-40 06:58:00* 



             Test Item    Value        Reference Range Interpretation Comments

 

             Chloride Level (test code = 2075-0) 105                      

         





[facility] HospitalCarbon Dioxide Wqemn3749-98-22 06:58:00* 



             Test Item    Value        Reference Range Interpretation Comments

 

             Carbon Dioxide Level (test code = 2028-9) 27           22-29       

               





[facility] HospitalAnion Leq9059-86-80 06:58:00* 



             Test Item    Value        Reference Range Interpretation Comments

 

             Anion Gap (test code = 33037-3) 12.5         8-16                  

     





[facility] HospitalBlood Urea Dmwadxsl0064-52-17 06:58:00* 



             Test Item    Value        Reference Range Interpretation Comments

 

             Blood Urea Nitrogen (test code = 3094-0) 27           7-26         

H             





[facility] HospitalCreatinine2018-03-03 06:58:00* 



             Test Item    Value        Reference Range Interpretation Comments

 

             Creatinine (test code = 2160-0) 0.92         0.72-1.25             

     





[facility] HospitalBUN/Creatinine Axsix6464-66-84 06:58:00* 



             Test Item    Value        Reference Range Interpretation Comments

 

             BUN/Creatinine Ratio (test code = 3097-3) 29           6-25        

 H             





[facility] HospitalEstimat Glomerular Filtration Rate
2018 06:58:00* 



             Test Item    Value        Reference Range Interpretation Comments

 

             Estimat Glomerular Filtration Rate (test code = 83591-2) 60-       

   >60                        





Ranges were taken from the National Kidney Disease Education Program and the [facility] [employer] literature.Reference ranges:60 or greater: Wuhkvv47-64 (
for 3 consecutive months): Chronic kidney disease 15 or less: Kidney failureNorth Central Surgical Center HospitalGlucose Ydern9379-62-41 06:58:00* 



             Test Item    Value        Reference Range Interpretation Comments

 

             Glucose Level (test code = ZZT7666) 159                 H    

         





[facility] HospitalCalcium Wjjvc6831-25-94 06:58:00* 



             Test Item    Value        Reference Range Interpretation Comments

 

             Calcium Level (test code = 60802-8) 9.2          8.4-10.2          

         





[facility] HospitalWhite Blood Judaj0158-65-22 06:45:00* 



             Test Item    Value        Reference Range Interpretation Comments

 

             White Blood Count (test code = 6690-2) 9.34         4.8-10.8       

            





[facility] HospitalRed Blood Beivz7558-67-29 06:45:00* 



             Test Item    Value        Reference Range Interpretation Comments

 

             Red Blood Count (test code = 789-8) 4.64         4.3-5.7           

         





[facility] HospitalHemoglobin2018-03-03 06:45:00* 



             Test Item    Value        Reference Range Interpretation Comments

 

             Hemoglobin (test code = 92380-0) 14.7         14.0-18.0            

      





[facility] HospitalHematocrit2018-03-03 06:45:00* 



             Test Item    Value        Reference Range Interpretation Comments

 

             Hematocrit (test code = 4544-3) 45.3         38.2-49.6             

     





[facility] HospitalMean Corpuscular Wtowxa5034-07-30 
06:45:00* 



             Test Item    Value        Reference Range Interpretation Comments

 

             Mean Corpuscular Volume (test code = 787-2) 97.6         81-99     

                 





[facility] HospitalMean Corpuscular Swbipkswbf6301-03-09 
06:45:00* 



             Test Item    Value        Reference Range Interpretation Comments

 

             Mean Corpuscular Hemoglobin (test code = 785-6) 31.7         28-32 

                     





[facility] HospitalMean Corpuscular Hemoglobin Concent
2018 06:45:00* 



             Test Item    Value        Reference Range Interpretation Comments

 

             Mean Corpuscular Hemoglobin Concent (test code = 786-4) 32.5       

  31-35                      





[facility] HospitalRed Cell Distribution Nqkxm6492-22-54 
06:45:00* 



             Test Item    Value        Reference Range Interpretation Comments

 

             Red Cell Distribution Width (test code = 74775-8) 13.8         11.7

-14.4                  





[facility] HospitalPlatelet Twisw8001-09-63 06:45:00* 



             Test Item    Value        Reference Range Interpretation Comments

 

             Platelet Count (test code = 777-3) 237          140-360            

        





[facility] HospitalNeutrophils (%) (Auto)2018 06:45:00
  * 



             Test Item    Value        Reference Range Interpretation Comments

 

             Neutrophils (%) (Auto) (test code = 79458-0) 71.6         38.7-80.0

                  





[facility] HospitalLymphocytes (%) (Auto)2018 06:45:00
  * 



             Test Item    Value        Reference Range Interpretation Comments

 

             Lymphocytes (%) (Auto) (test code = 736-9) 16.2         18.0-39.1  

  L             





[facility] HospitalMonocytes (%) (Auto)2018 06:45:00* 



             Test Item    Value        Reference Range Interpretation Comments

 

             Monocytes (%) (Auto) (test code = 5905-5) 9.4          4.4-11.3    

               





[facility] HospitalEosinophils (%) (Auto)2018 06:45:00
  * 



             Test Item    Value        Reference Range Interpretation Comments

 

             Eosinophils (%) (Auto) (test code = 713-8) 2.0          0.0-6.0    

                





[facility] HospitalBasophils (%) (Auto)2018 06:45:00* 



             Test Item    Value        Reference Range Interpretation Comments

 

             Basophils (%) (Auto) (test code = 706-2) 0.3          0.0-1.0      

              





[facility] HospitalIM GRANULOCYTES %2018 06:45:00* 



             Test Item    Value        Reference Range Interpretation Comments

 

             IM GRANULOCYTES % (test code = IM GRANULOCYTES %) 0.5          0.0-

1.0                    





[facility] HospitalNeutrophils # (Auto)2018 06:45:00* 



             Test Item    Value        Reference Range Interpretation Comments

 

             Neutrophils # (Auto) (test code = 751-8) 6.7          2.1-6.9      

              





[facility] HospitalLymphocytes # (Auto)2018 06:45:00* 



             Test Item    Value        Reference Range Interpretation Comments

 

             Lymphocytes # (Auto) (test code = 35431-0) 1.5          1.0-3.2    

                





[facility] HospitalMonocytes # (Auto)2018 06:45:00* 



             Test Item    Value        Reference Range Interpretation Comments

 

             Monocytes # (Auto) (test code = 742-7) 0.9          0.2-0.8      H 

            





[facility] HospitalEosinophils # (Auto)2018 06:45:00* 



             Test Item    Value        Reference Range Interpretation Comments

 

             Eosinophils # (Auto) (test code = 711-2) 0.2          0.0-0.4      

              





[facility] HospitalBasophils # (Auto)2018 06:45:00* 



             Test Item    Value        Reference Range Interpretation Comments

 

             Basophils # (Auto) (test code = 704-7) 0.0          0.0-0.1        

            





[facility] HospitalAbsolute Immature Granulocyte (auto
2018 06:45:00* 



             Test Item    Value        Reference Range Interpretation Comments

 

                                        Absolute Immature Granulocyte (auto (mary

t code = Absolute Immature Granulocyte 

(auto)          0.05            0-0.1                            





[facility] HospitalFree Cckyhkwsi6827-22-21 14:26:00* 



             Test Item    Value        Reference Range Interpretation Comments

 

             Free Thyroxine (test code = 3024-7) 0.99         0.9-1.8           

         





[facility] HospitalThyroid Stimulating Hormone (TSH)
2018 14:26:00* 



             Test Item    Value        Reference Range Interpretation Comments

 

             Thyroid Stimulating Hormone (TSH) (test code = 30262-2) 0.803      

  0.350-4.940                





[facility] HospitalFree Mylnubpgb3469-95-10 14:26:00* 



             Test Item    Value        Reference Range Interpretation Comments

 

             Free Thyroxine (test code = 3024-7) 0.99         0.9-1.8           

         





[facility] HospitalHemoglobin A1c Lqzprbn9493-62-76 14:07:00
  * 



             Test Item    Value        Reference Range Interpretation Comments

 

             Hemoglobin A1c Percent (test code = Hemoglobin A1c Percent) 6.9    

      4.0-7.0                    





[facility] HospitalCreatine Kinase GH7464-44-62 06:59:00* 



             Test Item    Value        Reference Range Interpretation Comments

 

             Creatine Kinase MB (test code = 33871-4) 4.30         0-5.0        

              





[facility] HospitalTroponin -77-52 06:59:00* 



             Test Item    Value        Reference Range Interpretation Comments

 

             Troponin I (test code = JIG4534) 0.047        0-0.300              

      





[facility] HospitalCreatine Oqgoup1874-24-57 06:48:00* 



             Test Item    Value        Reference Range Interpretation Comments

 

             Creatine Kinase (test code = 2157-6) 236                 H   

          





[facility] HospitalUrine UWM5567-82-67 05:47:00* 



             Test Item    Value        Reference Range Interpretation Comments

 

             Urine WBC (test code = 5821-4) 50-          0-5          H         

    





[facility] HospitalUrine NGK9910-77-53 05:47:00* 



             Test Item    Value        Reference Range Interpretation Comments

 

             Urine RBC (test code = 30463-7) 11-20        0-5          H        

     





[facility] HospitalUrine Oqetmmnw2123-31-94 05:47:00* 



             Test Item    Value        Reference Range Interpretation Comments

 

             Urine Bacteria (test code = 14457-1) MODERATE     NONE         H   

          





[facility] HospitalUrine Epithelial Axubg7668-49-98 05:47:00
  * 



             Test Item    Value        Reference Range Interpretation Comments

 

             Urine Epithelial Cells (test code = 85925-9) RARE         NONE     

                  





[facility] HospitalUrine Mqluk8073-91-08 05:34:00* 



             Test Item    Value        Reference Range Interpretation Comments

 

             Urine Color (test code = 5778-6) YELLOW       YELLOW               

      





[facility] HospitalUrine Njsbywn9295-80-62 05:34:00* 



             Test Item    Value        Reference Range Interpretation Comments

 

             Urine Clarity (test code = 00210-3) CLOUDY       CLEAR        H    

         





[facility] Specific Pumfttx3484-86-36 05:34:00
  * 



             Test Item    Value        Reference Range Interpretation Comments

 

             Urine Specific Gravity (test code = 5811-5) 1.020        1.010-1.02

5                





[facility] HospitalUrine bB1782-23-44 05:34:00* 



             Test Item    Value        Reference Range Interpretation Comments

 

             Urine pH (test code = 14460-8) 5            5-7                    

    





[facility] Leukocyte Ghluwksc9630-60-50 
05:34:00* 



             Test Item    Value        Reference Range Interpretation Comments

 

             Urine Leukocyte Esterase (test code = 5799-2) 2+           NEGATIVE

     [facility] Xitwnvo9227-20-39 05:34:00* 



             Test Item    Value        Reference Range Interpretation Comments

 

             Urine Nitrite (test code = 88569-7) POSITIVE     NEGATIVE     [facility] Pwugxce9631-24-88 05:34:00* 



             Test Item    Value        Reference Range Interpretation Comments

 

             Urine Protein (test code = 5804-0) 1+           NEGATIVE     H     

        





[facility] Glucose (UA)2018 05:34:00* 



             Test Item    Value        Reference Range Interpretation Comments

 

             Urine Glucose (UA) (test code = 2349-9) NEGATIVE     NEGATIVE      

             





[facility] HospitalUrine Owilecc3987-39-32 05:34:00* 



             Test Item    Value        Reference Range Interpretation Comments

 

             Urine Ketones (test code = 52066-1) NEGATIVE     NEGATIVE          

         





[facility] Dzfhcecxvwhu4330-78-64 05:34:00* 



             Test Item    Value        Reference Range Interpretation Comments

 

             Urine Urobilinogen (test code = 83927-7) 0.2          0.2-1        

              





[facility] HospitalUrine Mzfphfdva2805-66-47 05:34:00* 



             Test Item    Value        Reference Range Interpretation Comments

 

             Urine Bilirubin (test code = 1978-6) NEGATIVE     NEGATIVE         

          





[facility] HospitalUrine Kpvte0174-65-82 05:34:00* 



             Test Item    Value        Reference Range Interpretation Comments

 

             Urine Blood (test code = 72624-0) 4+           NEGATIVE     H      

       





[facility] Occult Vngob3667-88-24 13:16:00* 



             Test Item    Value        Reference Range Interpretation Comments

 

             Stool Occult Blood (test code = 2335-8) NEGATIVE     NEGATIVE      

             





[facility] Ivtgo3240-39-30 13:16:00* 



             Test Item    Value        Reference Range Interpretation Comments

 

             Stool Occult Blood (test code = 2335-8) NEGATIVE     NEGATIVE      

             





[facility] HospitalInfluenza Virus Types A,B Antigen
2018 12:42:00* 



             Test Item    Value        Reference Range Interpretation Comments

 

             Influenza Virus Types A,B Antigen (test code = 90015-5) NEGATIVE   

  NEGATIVE                   





[facility] HospitalInfluenza Virus Types A,B Antigen
2018 12:42:00* 



             Test Item    Value        Reference Range Interpretation Comments

 

             Influenza Virus Types A,B Antigen (test code = 76326-5) NEGATIVE   

  NEGATIVE                   





[facility] HospitalMagnesium Utsmc5080-70-44 12:16:00* 



             Test Item    Value        Reference Range Interpretation Comments

 

             Magnesium Level (test code = 05795-6) 1.9          1.3-2.1         

           





[facility] HospitalTotal Nkxezetmr2202-12-09 12:16:00* 



             Test Item    Value        Reference Range Interpretation Comments

 

             Total Bilirubin (test code = 1975-2) 0.9          0.2-1.2          

          





[facility] HospitalAspartate Amino Transf (AST/SGOT)
2018 12:16:00* 



             Test Item    Value        Reference Range Interpretation Comments

 

                                        Aspartate Amino Transf (AST/SGOT) (test 

code = Aspartate Amino Transf 

(AST/SGOT))     24              5-34                             





[facility] HospitalAlanine Aminotransferase (ALT/SGPT)
2018 12:16:00* 



             Test Item    Value        Reference Range Interpretation Comments

 

             Alanine Aminotransferase (ALT/SGPT) (test code = 1742-6) 27        

   0-55                       





[facility] HospitalTotal Apnrqdn5781-50-56 12:16:00* 



             Test Item    Value        Reference Range Interpretation Comments

 

             Total Protein (test code = 2885-2) 7.4          6.5-8.1            

        





[facility] HospitalAlbumin2018-03-01 12:16:00* 



             Test Item    Value        Reference Range Interpretation Comments

 

             Albumin (test code = 1751-7) 3.8          3.5-5.0                  

  





[facility] HospitalGlobulin2018-03-01 12:16:00* 



             Test Item    Value        Reference Range Interpretation Comments

 

             Globulin (test code = 31949-0) 3.6          2.3-3.5      H         

    





[facility] HospitalAlbumin/Globulin Abplb4343-64-86 12:16:00
  * 



             Test Item    Value        Reference Range Interpretation Comments

 

             Albumin/Globulin Ratio (test code = 1759-0) 1.1          0.8-2.0   

                 





[facility] HospitalAlkaline Rbmrlwyssoy8210-86-96 12:16:00* 



             Test Item    Value        Reference Range Interpretation Comments

 

             Alkaline Phosphatase (test code = 6768-6) 46                 

               





[facility] HospitalProthrombin Wrkv1147-06-17 12:07:00* 



             Test Item    Value        Reference Range Interpretation Comments

 

             Prothrombin Time (test code = 5902-2) 13.3         11.9-14.5       

           





[facility] HospitalProthromb Time International Ratio
2018 12:07:00* 



             Test Item    Value        Reference Range Interpretation Comments

 

             Prothromb Time International Ratio (test code = 6301-6) 1.09       

                             





Oral Anticoagulant Therapy INR Values:1. Low Intensity Therapy        1.5 - 2.02
. Moderate Intensity Therapy   2.0 - 3.03. High Intensity Therapy(1)    2.5 - 3.
54. High Intensity Therapy(2)    3.0 - 4.05. Panic Value INR              > 5.0
[facility] HospitalActivated Partial Thromboplast Time
2018 12:07:00* 



             Test Item    Value        Reference Range Interpretation Comments

 

             Activated Partial Thromboplast Time (test code = 43461-2) 24.9     

    23.8-35.5                  





[facility] HospitalCHEST [facility] VIEWS    [place] [address]      
Patient Name: ANDREW DONALDSON JR   MR #: [id number]    : 1945 Age/Sex: 
72/M  Acct #: [id number] Req #: [id number]  Adm Physician: AMY COATES MD 
  Ordered by: XAVI FRANKLIN MD  Report #: [id number]   Location: [facility]  Room/Bed: 
[place] [address]    ____________________________________________
_______________________________________________________    Procedure: [id number] 
DX/CHEST 2 VIEWS  Exam Date: 18                            Exam Time: 0730
      REPORT STATUS: Signed    EXAM: Single AP view of the chest (Portable).    
 COMPARISON: Chest radiograph from 2018      INDICATION: Hypoxia      FI
NDINGS: Single portable AP view of the chest. The visualized bones and soft   ti
ssues, cardiac silhouette ,  pleura appear unchanged.      IMPRESSION:       1. 
Lines/tubes: None   2.  Worsening bibasilar airspace opacity with mild volume lo
ss due to worsening   atelectasis or interval aspiration.      Signed by: Dr. Dilia Mariee M.D. on 3/4/2018 7:55 AM        Dictated By: BEN MARIEE MD  Electronically Signed By: BEN MARIEE MD on 18
075  Transcribed By: NGUYEN on 18 0755       COPY TO:   XAVI FRANKLIN MD  
     MRI BRAIN WO    [place] [address]      Patient Name: ANDREW DONALDSON JR   MR #:
[id number]    : 1945 Age/Sex: 72/M  Acct #: [id number] Req #: [id number]
[id number]  Adm Physician: AMY COATES MD    Ordered by: AMY COATES MD  
Report #: [id number]   Location: [facility]  Room/Bed: IMCU 197-1    
________________________________________
___________________________________________________________    Procedure: 0302-0
005 MRI/MRI BRAIN WO  Exam Date:                             Exam Time:        R
EPORT STATUS: Signed    Examination: MRI BRAIN WITHOUT CONTRAST   History: Dizzi
ness. Imbalance. Near syncope.   Comparison studies: None      Technique:    Sag
ittal T2; axial DWI, FLAIR, GRE or SWI, T1, Coronal FLAIR.   Intravenous contras
t: None      Findings:      Scalp: No abnormal signal.  No masses.   Bone marrow
: Normal in signal intensity.      Brain volume: Mild volume loss volume loss.  
Ventricles: Normal in size and configuration.   No hydrocephalus.        Extra-
axial spaces:   No abnormalities.      Parenchyma:   There are patchy and conflu
ent areas of T2/FLAIR hyperintensity in the   periventricular and subcortical wh
ite matter, nonspecific.    Chronic lacunar infarct (7.5 mm anteroposterior dime
nsion) in the left lateral   putamen.   No masses, hemorrhage, or acute vascular
insults.      Suprasellar and sellar region: No abnormalities.   Craniocervical 
junction: No abnormalities. The foramen magnum is patent. No   Chiari malformat
ions.   Vessels: Normal flow-voids in the arteries and sinuses.      Additional 
findings:Bilateral slitlike orbital lenses.      IMPRESSION:      1.  No acute i
ntracranial abnormalities.      2.  Mild volume loss and microvascular ischemic 
change.      3.  Chronic lacunar infarct of the left putamen.      Signed by: Dr Jack Boyd M.D. on 3/2/2018 1:07 PM        Dictated By: CHUY COLBY MD  Electronically Signed By: CHUY JALLOH MD on 18 
6211  Transcribed By: NGUYEN on 18 6631       COPY TO:   AMY COATES MD        CAROTID DOPPLER [place] [address]    Patient Name : ANDREW DONALDSON JR
        MR #: [id number]   : 1945         Age/Sex: 72/M      Acct # : 
[id number]           Adm Physician  : AMY COATES MD       Admit Date  :  
18       Location : [facility]    Room/Bed : [place] [address]          
____________________________________________
_______________________________________________________     REPORT: Cardiology [place]       DATE OF STUDY:  2018       DOPPLER SCAN OF CAROTIDS       
Left carotid artery shows velocity 1.4 meters per second in the left distal    
internal carotid artery.  Left vertebral flow appears antegrade.      Right farah
tid artery shows a stent in the mid right internal carotid artery    with veloci
ty 1.8 meters per second consistent with mild stenosis.  Right    vertebral flow
appears to be antegrade.      CONCLUSIONS    1. A stent is present in the right 
mid internal carotid artery with    velocity of 1.84 meters per second consiste
nt with mild to moderate    stenosis in the range of 50% to 70%.   2. Mild steno
sis involving the left distal internal carotid artery with    velocity of 1.4 me
ters per second.  The degree of stenosis is once again    in the range of 50% to
70%.   3. Borderline elevated velocity in the right external carotid artery    
with velocity of 1.3 meters per second.   4. Vertebral flow appears to be in nor
mal direction bilaterally.              DD:  2018 14:07   DT:  2018 
15:49   Job#:  [id number]       cc:   AMY COATES M.D.           Signature  
                                                                Date            
              Dictated By: FARIBA IZAGUIRRE MD  Transcribed By: [facility] on 18   
<Electronically signed by FARIBA IZAGUIRRE MD><<Signature on File>>18 1051    
COPY TO:     CHEST [facility] (PORTABLE)    [place] [address]      Patient Name: ANDREW DONALDSON JR   MR #: [id number]    : 1945 Age/Sex: 72/M  Acct #: 
[id number] Req #: [id number]  Adm Physician: AMY COATES MD    Ordered by:
AMY COATES MD  Report #: [id number]   Location: [facility]  Room/Bed: [place] [address] 
  ________________________________________
___________________________________________________________    Procedure: [id number]
022 DX/CHEST SINGLE (PORTABLE)  Exam Date: 18                            E
xam Time: 1000       REPORT STATUS: Signed    PROCEDURE: CHEST SINGLE (PORTABLE)
  COMPARISON: [facility], DX, CHEST SINGLE (PORTABLE),    3/01/201
8, 11:58.   INDICATIONS: SHORTNESS OF BREATH       FINDINGS:         LUNGS: Exam
is characterized by a poor inspiration. There is right    basilar subsegmental 
atelectasis. No consolidation.       PLEURA: No effusions or pneumothorax.      
HEART  T     MEDIASTINUM: The heart is within normal size-limits.       BONES  T
   SOFT TISSUES:  No acute findings.           CONCLUSION:      Right basilar 
subsegmental atelectasis.            Chon Jeong D.O.     Dictated by:  Chon Jeong D.O. on 3/02/2018 at 14:21        Electronically approved by:  Chon reina D.O. on 3/02/2018 at 14:21                   Dictated By: CHON JEONG DO  Electronically Signed By: CHON JEONG DO on 18 1422  Transcribed By:
LINDSAY on 18 1422       COPY TO:   AMY COATES MD        CHEST SINGLE 
(PORTABLE)    [place] [address]      Patient Name: ANDREW DONALDSON JR   MR #:
[id number]    : 1945 Age/Sex: 72/M  Acct #: [id number] Req #: [id number]
[id number]  Adm Physician:     Ordered by: ANDREW BARRAGAN NP  Report #: [id number]
  Location: ER  Room/Bed:     
______________________________________________________________________
_____________________________    Procedure: [id number] DX/CHEST SINGLE (PORTABLE)
 Exam Date: 18                            Exam Time: 1155       REPORT ST
ATUS: Signed    PROCEDURE:   CHEST SINGLE (PORTABLE)   TECHNIQUE:   Portable AP 
chest   INDICATION:   Low blood pressure   COMPARISON:   None.       FINDINGS:  
Lungs are clear and symmetrically inflated. No pleural effusions.    Normal hea
rt size, mediastinal contour, and pulmonary vasculature for    technique. Mild a
ortic arch calcification. Grossly intact skeleton.           CONCLUSION:   No ac
[place] abnormality.               Dictated by:  Gianluca Montilla M.D. on 3/01/20
18 at 12:25        Electronically approved by:  Gianluca Montilla M.D. on 3/01
/2018 at    12:25                Dictated By: GIANLUCA MONTILLA MD  Electronically 
Signed By: GIANLUCA MONTILLA MD on 18 1226  Transcribed By: LINDSAY on 18
1226       COPY TO:   ANDREW BARRAGAN NP

## 2020-11-28 NOTE — DIAGNOSTIC IMAGING REPORT
Exam: Head CT without contrast

History:  Trauma, fall on [place]

Comparison studies:  Brain MRI 3/2/2018



Technique:

Axial images were obtained from the skull base to the vertex.

Coronal and sagittal images reconstructed from the axial data.  Dose

modulation, iterative reconstruction, and/or weight based adjustment of the

mA/kV was utilized to reduce the radiation dose to as low as reasonably

achievable.  



Radiation dose: 

Total DLP: 921 mGy*cm. 

Estimated effective dose: DLP x 0.015 

Intravenous contrast: None



Findings:



Scalp: No abnormalities.

Bones: No fractures, blastic or lytic lesions.



Brain sulci: Mildly prominent.

Ventricles: Mild compensatory dilatation. No hydrocephalus.

Extra-axial spaces: No masses, no fluid collection. 



Parenchyma: 

A few scattered hypodensities in the supratentorial white matter are

nonspecific but are most compatible with chronic microvascular ischemic

changes. Small chronic lacunar infarct in the left lateral putamen is

unchanged. No mass, acute hemorrhage or acute or chronic cortical insults. 



Sellar/suprasellar region: No abnormalities.

Craniocervical junction: Patent foramen magnum. No Chiari one malformation.



Incidental findings: 

Bilateral intraocular lens replacements

Atherosclerotic calcifications in the distal right cervical internal carotid

artery, bilateral carotid siphons and left intradural vertebral artery.



IMPRESSION:

 

No acute intracranial abnormalities.

Specifically, no acute intracranial hemorrhage.



Chronic findings:

1.  Minimal generalized parenchymal volume loss.

2.  Chronic lacunar infarct in the left putamen.

3.  Mild microvascular ischemic changes.



Signed by: Dr. Silvio Quevedo M.D. on 11/28/2020 3:00 PM

## 2020-11-28 NOTE — HISTORY AND PHYSICAL
CONSULTING PHYSICIAN:  Dr. Ernst with General Surgery.

 

PRIMARY CARE PHYSICIAN:  Dr. Rick Dozier.

 

OUTPATIENT ENDOCRINOLOGIST:  Dr. Grisel Leiva.

 

OUTPATIENT CARDIOLOGIST:  Dr. Barriga at [facility].

 

CHIEF COMPLAINT:  Left leg pain.

 

HISTORY OF PRESENT ILLNESS:  The patient is a 75-year-old male, admitted through the

emergency room via [facility] with complaints of left leg pain, who fell 10 days ago at

the grocery store after a syncopal episode.  He takes Plavix and Eliquis at home for

atrial fibrillation and about 3 days ago, he noticed bruising and swelling of the left

leg with pain there, exacerbated with movement and weightbearing.  The patient is

currently seen in room 103 alone without any distress. 

 

PAST MEDICAL HISTORY:  Atrial fibrillation, on long-term current anticoagulant Eliquis;

chronic diastolic congestive heart failure; hypertension; hyperlipidemia; coronary

artery disease; carotid artery disease; type 2 diabetes mellitus; probable obstructive

sleep apnea; GERD; sepsis; UTI; obesity. 

 

PAST SURGICAL HISTORY:  Bilateral carotid stenting, coronary stenting x1 vessel,

tonsillectomy and adenoidectomy, bilateral cataract removal, detached retina repair of

the left eye, major right knee surgery in the 1970s. 

 

FAMILY HISTORY:  Father had CHF and atrial fibrillation.  Mother had Alzheimer's.

Sister had COPD.  Another sister had a liver transplant. 

 

SOCIAL HISTORY:  The patient is , lives alone.  He is a former insurance

, now retired.  He denies any previous use of tobacco or illicit drugs.  He has

not drank alcohol in 40 years, although he drank a lot in college. 

 

ALLERGIES:  NO KNOWN ALLERGIES.

 

HOME MEDICATIONS:  Furosemide 40 mg daily, losartan potassium 50 mg daily, metformin 500

mg b.i.d., metoprolol succinate 100 mg daily, simvastatin 20 mg at bedtime, tamsulosin

0.4 mg daily, Eliquis 5 mg b.i.d. 

 

REVIEW OF SYSTEMS:

Fourteen-point review of systems was completed and the patient denied any complaints

regarding constitutional, eyes, ears, nose, throat, psychiatric, neurologic, or

allergic/immunological systems. 

 

RESPIRATORY:  He has chronic shortness of breath and chronic cough with phlegm. 

GENITOURINARY:  He is on Flomax.  He has a history of difficulty urinating. 

INTEGUMENTARY:  Scabs on right lower extremity. 

CARDIOVASCULAR:  History of atrial fibrillation, irregular heartbeat, and poor energy

levels, likely related to history of congestive heart failure. 

GASTROINTESTINAL:  No complaints of nausea, vomiting, diarrhea, or constipation.  His

last bowel movement was 11/27. 

MUSCULOSKELETAL:  Left lower extremity pain, currently rated 6/10, but if he or someone

else is pushing on the quadriceps muscle, it increases the pain at 8/10 on a 0-10 pain

scale. 

ENDOCRINE:  Known diabetic. 

HEMATOLOGIC:  Bruising on the right lower extremity as well as bruising above the left

eye. 

 

PHYSICAL EXAMINATION:

VITAL SIGNS:  Temperature 98.9, pulse 78, blood pressure 132/107, respirations 18, and

oxygen saturation 98%.  Height 5 feet 9 inches, weight 252 pounds, BMI 37.2. 

GENERAL:  Supine in bed with head of bed elevated, in no acute distress. 

LUNGS:  Generally clear to auscultation.  Respiratory pattern even and unlabored.  He is

on oxygen at 2 L/minute via nasal cannula. 

HEENT:  EOMI.  Oropharynx clear.  Left supraorbital hematoma noted. 

NECK:  Supple.  No lymphadenopathy, thyromegaly, or JVD. 

CARDIOVASCULAR:  Irregularly irregular rhythm.  No murmur. 

ABDOMEN:  Bowel sounds positive.  Soft, nontender, obese. 

EXTREMITIES:  With 2+ pitting edema in bilateral lower extremities, slightly more

edematous on the left leg.  He has scattered bruising over the entire left leg and

scattered scabs over the right lower extremity. 

NEUROLOGICAL:  GCS 15.  Nonfocal.

LABORATORY DATA:  WBC 9.87, hemoglobin 11.8, hematocrit 37.2, platelets 293, neutrophils

73.6%.  PT 15.7, INR 1.19, PTT 26.3.  Sodium 142, potassium 4.5, chloride 104, CO2 of

30, anion gap 12.5, BUN 32, creatinine 1.18, estimated GFR 60, glucose 186, calcium 8.8,

total bilirubin 2.3, AST 38, ALT 39, alkaline phosphatase 62.  Creatine kinase less than

7, then subsequently 243; CK-MB 6.8, then subsequently 6.2; troponin I 0.063 twice.

B-type natriuretic peptide 465.1.  Total protein 6.5, albumin 3.2, globulin 3.3.

Urinalysis with yellow, slightly cloudy urine, pH 5.5.  Urine specific gravity greater

than or equal to 1.03, trace amount of ketones, trace amount of blood, negative for

nitrites, negative for leukocyte esterase, rbc 6 to 10, few epithelial cells, rare urine

bacteria.  Coronavirus PCR was not detected.  Urine cultures pending. 

 

IMAGING/OTHER:  A 12-lead EKG showed sinus rhythm with first-degree AV block, multifocal

PVCs and a heart rate of 78.  Chest x-ray showed diffuse interstitial and patchy

airspace opacities, which may represent combination of bronchovascular crowding and

subsegmental atelectasis, however, multifocal pneumonia can have a similar appearance

and should be considered in the proper clinical context.  CT of the brain showed no

acute intracranial abnormality, specifically no acute intracranial hemorrhage.  Minimal

generalized parenchymal volume loss, chronic lacunar infarct in the left putamen, mild

microvascular ischemic changes.  CT of the left lower extremity showed ill-defined mild

prominence of the mid deep quadriceps muscle, suggestive of intramuscular hematoma.  No

evidence of acute extravasation.  No acute fracture or evidence of dislocation.  Severe

tricompartmental osteoarthritis of the left knee.  Echocardiogram has been ordered and

Dr. Waller has seen the patient in the past.  We will ask him to interpret.  Venous

Doppler ultrasound of the left lower extremity was negative for DVT. 

 

ASSESSMENT AND PLAN:  

1. Intramuscular hematoma of the left lower extremity.  Venous Doppler ultrasound

negative for deep venous thrombosis in the left leg.  We will ask Dr. Ernst with

General Surgery to evaluate to ensure surgery is not required.  Monitor for improvement

in hematoma. 

2. Witnessed syncope with fall on 11/19/2020, ambulatory dysfunction, severe

tricompartmental osteoarthritis of the right knee, history of carotid disease.  Given

that the patient had witnessed syncope and has significant cardiac history, we will go

ahead and check an echocardiogram.  The patient also previously had a history of

stenting of bilateral carotid arteries.  We will go ahead and get a bilateral carotid

Doppler ultrasound and ask Dr. Waller to read.  Physical Therapy eval and treat.  Fall

precautions. 

3. Chronic diastolic congestive heart failure.  B-type natriuretic peptide 465.1.  Chest

x-ray shows interstitial and patchy airspace opacities.  Continue to monitor chest x-ray

results.  Lasix 40 mg p.o. daily resumed as well as metoprolol succinate 100 mg daily. 

4. Paroxysmal atrial fibrillation, heart rate controlled, long-term current use of

anticoagulant Eliquis.  Monitor telemetry.  Consider Cardiology consultation.  Continue

Eliquis for now. 

5. Controlled hypertension with diastolic congestive heart failure, losartan potassium

50 mg daily and metoprolol succinate resumed. 

6. Uncontrolled type 2 diabetes mellitus with hyperglycemia.  We will check hemoglobin

A1c in the morning, serum glucose 186.  Metformin 500 mg b.i.d. resumed.  Low dose

regular insulin sliding scale started.  Monitor fingerstick blood glucose level before

meals and at bedtime. 

7. Hyperlipidemia.  Home dose of simvastatin resumed.

8. Coronary artery disease, history of coronary stenting.  We will check lipid panel.

9. Obesity with BMI of 37.2, probable obstructive sleep apnea.  Dietary restrictions.

Monitor sleeping pattern for signs of obstructive sleep apnea. 

10. Gastroesophageal reflux disease/prophylaxis.  Protonix, Eliquis. 

History and physical dictated, observation status, billing code [id number], time spent

greater than 70 minutes. 

 

 

Dictated by Jorje Miramontes NP

 

______________________________

Higinio Martinez MD

 

HWP/MODL

D:  11/28/2020 21:15:56

T:  11/28/2020 23:37:10

Job #:  [id number]

## 2020-11-28 NOTE — NUR
RECEIVED PT IN BED AOX3 .RESPIRATIONS ARE EVEN AND UNLABORED ,LEFT LEG SWOLLEN AND BRUISED 
,BOTH  LOWER LEGS NOTED BLISTERS DENIES PAIN RT AC 20 G S/L .ASSESSMENT  DONE ORIENTED THE 
PT TO THE ENVIRONMENT .CALL LIGHT WITH IN REACH ,CONTINUE TO MONITOR

## 2020-11-28 NOTE — XMS REPORT
Continuity of Care Document

                             Created on: 2020



Godfrey Bryant JR

External Reference #: [id number]

: 1945

Sex: Male



Demographics





                          Address                   [address] [place]  [address]

 

                          Home Phone                [phone]

 

                          Preferred Language        English

 

                          Marital Status            Unknown

 

                          Islam Affiliation     Unknown

 

                          Race                      Unknown

 

                          Ethnic Group              Unknown





Author





                          Author                    [employer]

 Godfrey Franco

 

                          Organization              [employer]

 

                          Address                   Unknown

 

                          Phone                     Unavailable







Care Team Providers





                    Care Team Member Name Role                Phone

 

                    [email or web address] Unavailable         Un

available



                                    



Problems

                    



                    Problem                         Status                      

   Onset Date       

                          Classification                         Date Reported  

         

                          Comments                         Source               

     

 

                    Diabetes                         Active                     

                    

                    Problem                         2020                  

            

                                        Enayet Rahim                    

 

                          BPH (benign prostatic hyperplasia)                    

     Active               

                                             Problem                             

                                                    Enayet Rahim                

    

 

                    HTN (hypertension)                         Active           

                    

                          Problem                         2020            

        

                                                    Enayet Rahim                

    

 

                    Carotid arterial disease                         Active     

                    

                          Problem                         2020            

  

                                                    Enayet Rahim                

    

 

                          PAD (peripheral artery disease)                       

  Active                  

                                             Problem                                

                                                    Enayet Rahim                

    

 

                    Pain in left knee                         Active            

                    

                          Diagnosis                         2017          

         

                                                    Enayet Rahim                

    

 

                    Other chronic pain                         Active           

                    

                          Problem                         2020            

        

                                                    Enayet Rahim                

    

 

                    Fall, initial encounter                         Active      

                    

                          Diagnosis                         2020          

   

                                                    Enayet Rahim                

    

 

                          Acute pain of right shoulder                         A

ctive                     

                                             Diagnosis                         0

2018        

                                                    Enayet Rahim                

    

 

                          Chronic diastolic heart failure                       

  Active                  

                                             Problem                         2020       

                                                    Koffi Barriga MD, PA        

          

 

 

                          Occlusion and stenosis of right carotid artery        

                 Active   

                                              Problem                         

2020                                                   Koffi Barriga MD, P

A

                   

 

                          Abnormal electrocardiogram [ECG] [EKG]                

         Active           

                                             Problem                         

2020                                                   Koffi Barriga MD P

A

                    

 

                    Obesity, unspecified                         Active         

                    

                          Problem                         2020            

     

                                                    Koffi Barriga MD, PA        

            

 

                    CHF, chronic systolic                         Active        

                    

                          Problem                         2020            

    

                                                    Koffi Barriga MD, PA        

            

 

                          Body mass index (BMI) 38.0-38.9, adult                

         Active           

                                             Problem                         

2020                                                   Koffi Barriga MD P

A

                    

 

                          Peripheral vascular disease, unspecified              

           Active         

                                             Problem                         

2020                                                   Koffi Barriga MD, P

A

                    

 

                                        Type 2 diabetes mellitus with diabetic p

eripheral angiopathy without gangrene   

                      Active                                                  

Problem                         2020                                      

                                        Koffi Barriga MD, PA                    

 

                          Coronary angioplasty status                         Ac

tive                      

                                             Problem                         2020          

                                                    Koffi Barriga MD, PA        

            

 

                                        Atherosclerotic heart disease of native 

coronary artery without angina pectoris 

                        Active                                                  

Problem                         2020                                      

                                        Koffi Barriga MD, PA                    

 

                          Coronary atherosclerosis of native coronary artery    

                     

Active                                                   Problem                

 

                    2020                                                  

Koffi Barriga MD, PA                    

 

                          Peripheral vascular disease                         Ac

tive                      

                                             Problem                         2020          

                                                    Koffi Barriga MD, PA        

            

 

                          Diabetes mellitus type II                         Acti

ve                        

                                             Problem                         2020            

                                                    Koffi Barriga MD, PA        

            

 

                    Abnormal ECG                         Active                 

                    

                    Problem                         2020                  

       

                                        Koffi Barriga MD, PA                    

 

                    Morbid obesity                         Active               

                    

                          Problem                         2020            

           

                                                    Koffi Barriga MD, PA        

            

 

                          Postprocedural PCI status                         Acti

ve                        

                                             Problem                         2020            

                                                    Koffi Barriga MD, PA        

            

 

                    Mixed hyperlipidemia                         Active         

                    

                          Problem                         2020            

     

                                                    Koffi Barriga MD, PA        

            

 

                    Carotid stenosis                         Active             

                    

                          Problem                         2020            

         

                                                    Koffi Barriga MD, PA        

            

 

                          Chronic systolic CHF (congestive heart failure)       

                  Active  

                                                Problem                         

2020                                                   Enparisaet Nguyen       

 

            

 

                          Well controlled diabetes mellitus                     

    Active                

                                             Problem                             

                                                    Enparisaet Nguyen                

    

 

                          PAF (paroxysmal atrial fibrillation)                  

       Active             

                                             Problem                          

                                                    Enmadison Cedillo                

   

 

 

                          Atrial fibrillation- Chronic                         A

ctive                     

                                             Problem                         2020         

                                                    Koffi Barriga MD, PA        

           

 

 

                          Paroxysmal atrial fibrillation                        

 Active                   

                                             Problem                         2020       

                                                    Koffi Barriga MD, PA        

         

   

 

                          Perforated right tympanic membrane on examination     

                    Active

                                                    Diagnosis                   

  

                    10/24/2018                                                  

Vivian Cedillo    

                

 

                          Body mass index (BMI) 37.0-37.9, adult                

         Active           

                                             Problem                         

2020                                                   Koffi Barriga MD, P

A

                    

 

                          Other bilateral secondary osteoarthritis of knee      

                   Active 

                                                    Diagnosis                   

   

                    2017                                                  

Vivian Cedillo     

               

 

                    Acute tracheobronchitis                         Active      

                    

                          Problem                         2020            

  

                                                    Vivian Cedillo                

    

 

                          Other obesity due to excess calories                  

       Active             

                                             Problem                         2020 

                                                    Koffi Barriga MD, PA        

   

         

 

                    BMI 35.0-35.9,adult                         Active          

                    

                          Problem                         2020            

      

                                                    Vivian Cedillo                

    

 

                    Flu vaccine need                         Active             

                    

                          Diagnosis                         2019          

         

                                                    Vivian Cedillo                

    

 

                    Risk for falls                         Active               

                    

                          Diagnosis                         2020          

           

                                                    Vivian Cedillo                

    

 

                    Eye exam abnormal                         Active            

                    

                          Diagnosis                         09/10/2019          

        

                                                    Vivian Cedillo                

    

 

                          Pulmonary hypertension, unspecified                   

      Active              

                                             Problem                         2020  

                                                    Koffi Barriga MD, PA        

    

        

 

                          CAD (coronary artery disease)                         

Active                    

                                             Problem                                 

                                                    Enmadison Cedillo                

    

 

                    Bilateral leg edema                         Active          

                    

                          Diagnosis                         2020          

      

                                                    Vivian Cedillo                

    

 

                    BMI 36.0-36.9,adult                         Active          

                    

                          Problem                         2020            

      

                                                    [facility]                

    

 

                          Cellulitis of leg without foot, left                  

       Active             

                                             Diagnosis                         

2020                                                   [facility]       

 

            

 

                          Hematoma of left lower extremity, initial encounter   

                      

Active                                                   Diagnosis              

 

                    2020                                                  

[facility]                    

 

                          Transient Ischemic Attack                         Acti

ve                        

                                                                      2014

                   

                                                    [facility]               

     

 

                          Diabetes Mellitus With Peripheral Circulatory Disorder

                         

Active                                                                          

 

                    2014                                                  

[facility]      

              

 

                    Hypertension                         Active                 

                    

                                             2014                         

       

                                        [facility]                    

 

                          Mixed Hyperlipoproteinemia                         Act

rickey                       

                                                                      2014

                  

                                                    [facility]               

     

 

                    Coronary Artery Disease                         Active      

                    

                                                    2014                  

   

                                                    [facility]               

     

 

                          Peripheral Vascular Disease                         Ac

tive                      

                                                                      2014

                 

                                                    [facility]               

     

 

                          Thyroid Function Tests Nonspecific Abnormal Findings  

                       

Active                                                                          

 

                    2014                                                  

[facility]      

              

 

                          Vaccines Prophylactic Need Against Influenza          

               Active     

                                                                      2014

                                                    [facility]               

  

   

 

                    Vitamin D Deficiency                         Active         

                    

                                                    2014                  

      

                                                    [facility]               

     

 

                    Acute Bronchitis                         Active             

                    

                                             2014                         

   

                                        [facility]                    



                                                                                
                                                                                
                                                                                
                                                                                
                                                                                
                                                                                
                                                                                
                                                                                
                                                                                
                                                                                
                                                                                
                                                                                
        



Medications

                    



                    Medication                         Details                  

       Route        

                          Status                         Patient Instructions   

         

                          Ordering Provider                         Order Date  

             

                                        Source                    

 

                    Cephalexin                         1 capsule                

         [facility]     

                          Active                         500 MG Orally every 12 

hrs   

                          [facility]                         11/10/2020             

    

                                        Vivian Cedillo                    

 

                    Tamsulosin HCl                         1 capsule            

             Orally 

                          Active                         0.4 MG Orally Once a da

y 

                          [facility]                         2020             

  

                                        Vivian Cedillo                    

 

                    Isosorbide Mononitrate                         1 tablet     

                    

Orally                         Active                         10 MG Orally Twice

 a day                         [facility]                         2020        

                                        oKffi Barriga MD, PA                    

 

                          Azithromycin                         2 tablets  on the

 first day, then 1 tablet 

daily for 4 days                         Or[facility]                         Active  

                          250 MG Orally Once a day                         [facility]  

                          2018                         Vivian Carm       

   

          

 

                    PredniSONE                         1 tablet                 

        Orally      

                          Active                         20 MG Orally Once a day

       

                          [facility]                         2018             

        

                                        Vivian Cedillo                    

 

                          Ciprodex                         4 drops into affected

 ear                      

                    Otic                         Active                         

0.3-0.1 % Otic 

Twice a day                         [facility]                         10/18/2018   

                                        Vivian Carm                    

 

                    Furosemide                         1 tablet                 

        Orally      

                          Active                         40 mg Orally Once a day

       

                          [facility]                         2018             

        

                                        Koffi Barriga MD, PA                    

 

                    Eliquis                         1 tablet                    

     Orally         

                          Active                         5 MG Orally twice a day

 (bid)    

                          [facility]                         2018             

     

                                        Koffi Barriga MD, PA                    

 

                    Aspirin                         1 tablet                    

     Orally         

                          Active                         81 MG Orally Once a day

          

                          [facility]                         2018             

           

                                        Koffi Barriga MD, PA                    

 

                    Tramadol HCl                         1 tablet as needed     

                    

Orally                         Active                         50 MG Orally every

 12 hrs as needed                         [facility]                         

2018                              Vivian Cedillo                    

 

                    Tamsulosin HCl                         1 capsule            

             Orally 

                          Active                         0.4 MG Orally once a da

y 

                          [facility]                         02/15/2017             

  

                                        Vivian Cedillo                    

 

                          Azithromycin 250 MG Oral Tablet                       

  ; Start Date: 

2013; End Date: 1900 (Active)                                       

                    Active                                                      

         

                          2013                         [facility]      

              

 

                          Dexamethasone 4 MG Oral Tablet                        

 ; Start Date: 2013;

 End Date: 2013 (Active)                                                  

 

Active                                                                          

 

                          2013                         [facility]      

              

 

                          Accu-Chek Kati In Vitro Strip                        

 ; Start Date: 2012 

(Active)                                                   Active               

 

                                                                      2012

           

                                        [facility]                    

 

                          Accu-Chek Multiclix Lancets Miscellaneous             

            ; Start Date: 

2012 (Active)                                                   Active    

 

                                                                      2012

                                        [facility]                    

 

                          Pen Needles 16" 31G X 8 MM Miscellaneous            

             ; Start Date:

 2012 (Active)                                                   Active   

 

                                                                       

2012                              [facility]                    

 

                          Januvia 100 MG Oral Tablet                         ; S

tart Date: 2012 

(Active)                                                   Active               

 

                                                                      2012

           

                                        [facility]                    

 

                          MetFORMIN HCl 1000 MG Oral Tablet                     

    ; Start Date: 

2012 (Active)                                                   Active    

 

                                                                      2012

                                        [facility]                    

 

                          Levemir FlexPen 100 UNIT/ML Subcutaneous Solution     

                    ; 

Start Date: 2012 (Active)                                                 

                    Active                                                      

                   

                          2012                         [facility]      

              

 

                          Cyanocobalamin 2500 MCG Sublingual Tablet Sublingual  

                       ; 

Start Date: 2012 (Active)                                                 

                    Active                                                      

                   

                          2012                         [facility]      

              

 

                          Clopidogrel Bisulfate 75 MG Oral Tablet               

          ; Start Date: 

2012 (Active)                                                   Active    

 

                                                                      2012

                                        [facility]                    

 

                          Hydrochlorothiazide 12.5 MG Oral Tablet               

          ; Start Date: 

2012 (Active)                                                   Active    

 

                                                                      2012

                                        [facility]                    

 

                          HumaLOG KwikPen 100 UNIT/ML Subcutaneous Solution     

                    ; 

Start Date: 2012 (Active)                                                 

                    Active                                                      

                   

                          2012                         [facility]      

              

 

                                        Metoprolol Succinate ER 50 MG Oral Table

t Extended Release 24 Hour              

                          ; Start Date: 2012 (Active)                     

               

                    Active                                                      

      

                          2012                         [facility]      

           

   

 

                          Ramipril 10 MG Oral Capsule                         ; 

Start Date: 2012 

(Active)                                                   Active               

 

                                                                      2012

           

                                        [facility]                    

 

                          Crestor 20 MG Oral Tablet                         ; St

art Date: 2012 

(Active)                                                   Active               

 

                                                                      2012

           

                                        [facility]                    

 

                          Vitamin D 2000 UNIT Oral Capsule                      

   ; Start Date: 

2012 (Active)                                                   Active    

 

                                                                      2012

                                        [facility]                    

 

                          AmLODIPine Besylate 2.5 MG Oral Tablet                

         ; Start Date: 

2012 (Active)                                                   Active    

 

                                                                      2012

                                        [facility]                    

 

                          Clopidogrel Bisulfate                         not defi

cyndi                       

                    NA                         Active                           

                  

                    Tasia Monk

et Nguyen       

             

 

                    Simvastatin                         not defined             

            NA      

                    Active                                                  

Tasia Cedillo           

 

        

 

                          Metoprolol Succinate ER                         not de

fined                     

                    NA                         Active                           

                

                    Tasia Monk

et Nguyen     

               

 

                    Tamsulosin HCl                         1 capsule            

             Orally 

                          Active                         0.4 MG Orally Once a da

y 

                        Tasia Cedillo                    

 

                    Ramipril                         not defined                

         NA         

                    Active                                                  [facility] Cedillo                

    

 

                    Metformin HCl                         not defined           

              NA    

                     Active                                                  

Tasia Cedillo           

 

        

 

                    Tamsulosin HCl                         1 capsule            

             Orally 

                          Active                         0.4 MG Orally Once a da

y 

                        Tasia Cedillo                    

 

                          Metoprolol Succinate ER                         not de

fined                     

                    NA                         Active                           

                

                    Tasia Monk

et Nguyen     

               

 

                          Metformin HCl                         1 tablet with me

als                       

                    Orally                         Active                       

  500 MG Orally 

Twice a day                         Tasia Cedillo                    

 

                    Simvastatin                         not defined             

            NA      

                    Active                                                  

Tasia Cedillo           

 

        

 

                    Losartan Potassium                         not defined      

                   

NA                         Active                                               

                    Tasia Monk

et Nguyen         

           

 

                    Aspirin                         1 tablet                    

     Orally         

                          Active                         81 MG Orally Once a day

          

                    Nithya Barriga MD, PA                    

 

                    Fish Oil                         1 capsule                  

       Orally       

                          Active                         500 MG Orally Twice a d

      

                    Nithya Barriga MD, PA                    

 

                          Metformin HCl                         1 tablet with me

als                       

                    Orally                         Active                       

  500 MG Orally 

Twice a day                         Nithya Barriga MD, PA                    

 

                          Metoprolol Succinate ER                         1 tabl

et                        

                    Orally                         Active                       

  50 MG Orally Once

 a day                         Nithya Barriga MD, PA                    

 

                    Humalog                         not defined                 

        Subcutaneous

                          Active                         100 UNIT/ML Subcutaneou

s

                          Nithya Barriga MD, PA                    

 

                    Tradjenta                         1 tablet                  

       Orally       

                          Active                         Orally Once a day      

        

                    Nithya Barriga MD, PA                    

 

                    Levemir                         not defined                 

        Subcutaneous

                          Active                         100 UNIT/ML Subcutaneou

s

                          Nithya Barriga MD, PA                    

 

                    Losartan Potassium                         1 tablet         

                

Orally                         Active                         50 MG Orally Once 

a day                         Nithya Barriga MD, PA                    

 

                    Ramipril                         1 capsule                  

       Orally       

                          Active                         10 MG Orally Once a day

        

                    Nithya Barriga MD, PA                    

 

                    Clopidogrel Bisulfate                         1 tablet      

                   

Orally                         Active                         75 MG Orally Once 

a day                         Koffi Boyle MD, PA       

             

 

                          Metoprolol Succinate ER                         1 tabl

et                        

                    Orally                         Active                       

  100 MG Orally 

Once a day                         Nithya Barriga MD, PA                    

 

                    Losartan Potassium                         1 tablet         

                

Orally                         Active                         50 MG Orally Once 

a day                         Tasia Cedillo                    

 

                    Furosemide                         1 tablet                 

        Orally      

                          Active                         40 MG Orally Once a day

       

                    Tasia Dasilva                    

 

                    Novolin N                         20-25 units               

          

Subcutaneous                         Active                         100 UNIT/ML 

Subcutaneous at night                         Tasia Cedillo                    

 

                    Novolin R                         36 units                  

       Injection    

                          Active                         100 UNIT/ML Injection T

ID   

                      Tasia Cedillo                    

 

                    Eliquis                         1 tablet                    

     Orally         

                          Active                         5 MG Orally BID        

          

                    Tasia Monk

jun 

Koffi Cedillo MD, PA                    

 

                          Simvastatin                         1 tablet in the ev

ening                     

                    Orally                         Active                       

  10 MG Orally 

Once a day                         Koffi Boyle MD, PA       

             

 

                    Ramipril                         1 capsule                  

       Orally       

                          Active                         10 MG Orally Once a day

        

                    Nithya Barriga MD, PA                    

 

                          Metoprolol Succinate ER                         1 tabl

et                        

                    Orally                         Active                       

  50 MG Orally Once

 a day                         Nithya Barriga MD, PA                    

 

                    Levemir                         Unknown                     

    Subcutaneous    

                          Active                         100 UNIT/ML Subcutaneou

s    

                     Nithya Barriga MD, PA                    

 

                          Metformin HCl                         1 tablet with me

als                       

                    Orally                         Active                       

  500 MG Orally 

Twice a day                         Nithya Barriga MD, PA                    

 

                    Clopidogrel Bisulfate                         1 tablet      

                   

Orally                         Active                         75 MG Orally Once 

a day                         Nithya Barriga MD, PA                    

 

                    Januvia                         2 tablets                   

      Orally        

                          Active                         50 MG Orally Once a day

         

                    Nithya Barriga MD, PA                    

 

                    Humalog                         Unknown                     

    Subcutaneous    

                          Active                         [facility]

s    

                     Nithya Barriga MD, PA                    

 

                    Fish Oil                         1 capsule                  

       Orally       

                          Active                         500 MG Orally Twice a d

ay      

                    Nithya Barriga MD, PA                    

 

                          Simvastatin                         1 tablet in the ev

ening                     

                    Orally                         Active                       

  10 mg Orally 

Once a day                         Nithya Barriga MD, PA                    

 

                    Tradjenta                         1 tablet                  

       Orally       

                          Active                         Orally Once a day      

        

                    Nithya Barriga MD, PA                    

 

                    Aspirin                         1 tablet                    

     Orally         

                          Active                         81 MG Orally Once a day

          

                    Nithya Barriga MD, PA                    

 

                          Metoprolol Succinate ER                         1 tabl

et                        

                    Orally                         Active                       

  100 mg Orally 

Once a day                         Nithya Barriga MD, PA                    

 

                          Furosemide                         TAKE 1 TABLET BY [facility] ONCE DAILY FOR 30 DAYS

                          Orally                         Active                 

 

                          40MG Orally Once a day                         Nithya Barriga MD, PA        

            

 

                          Simvastatin                         1 tablet in the ev

ening                     

                    Orally                         Active                       

  10 mg Orally 

Once a day                         Nithya Barriga MD, PA                    

 

                          Simvastatin                         1 tablet in the ev

ening                     

                    Orally                         Active                       

  10 MG Orally 

Once a day                         Tasia                                       

                                        [facility]                    

 

                    Tamsulosin HCl                         1 capsule            

             Orally 

                          Active                         0.4 MG Orally Once a da

y 

                        Tasia Greerparisajun Yohikingsley                    

 

                    Furosemide                         1 tablet                 

        Orally      

                          Active                         40MG Orally Once a day 

       

                    Nithya Monk

et 

Koffi Cedillo MD, PA                    

 

                          Metoprolol Succinate ER                         1 tabl

et                        

                    Orally                         Active                       

  100MG Orally Once

 a day                         Nithya Barriga MD, PA                    



                                                                                
                                                                                
                                                                                
                                                                                
                                                                                
                                                                                
                                                                                
                                                                                
                                                                                
                                                                                
                                                                                
                                                                                
                                                                                
                                                                                
                                                                                
                                                    



Allergies, Adverse Reactions, Alerts

                    



                    Substance                         Category                  

       Reaction     

                          Severity                         Reaction type        

      

                    Status                         Date Reported                

         

Comments                                Source                    

 

                    N.K.D.A.                         Adverse Reaction           

              Info 

Not Available                                                   Adverse Reaction

 

                                                    2020                  

   

                                                    Enayet Rahim                

    

 

                          No Known Drug Allergies                         drug a

llergy                    

                                                                      drug aller

gy             

                    Active                                                      

        

                                        [facility]                    



                                                                



Immunizations

                    



                    Immunization                         Date Given             

            Site    

                          Status                         Last Updated           

     

                          Comments                         Source               

     

 

                           -  FLUZONE IIV4                                              

                                             completed                          

                

                                                    Enayet Rahim                

    

 

                          2019 Single Syringe Fluecelvax Quad                   

      2019          

                                             completed                          

     

                                                    Enayet Rahim                

    

 

                          Fluzone Intramuscular Injectable                      

   2013             

                                             completed                          

        

                                                    [facility]               

     

 

                    Influenza                                                   

                    

                    completed                                                   

                

                                        [facility]                    



                                                                                
             



Results





                                        No Data Provided for This Section



                    



Pathology Reports





                                        No Data Provided for This Section       

             



                            



Diagnostic Reports

            



                                        No Data Provided for This Section       

             



                                                            



Consultation Notes

                    



                                        No Data Provided for This Section       

             



                                                            



Discharge Summaries

                    



                                        No Data Provided for This Section       

             



                                                            



History and Physicals

                    



                                        No Data Provided for This Section       

             



                                                                



Vital Signs

                     



                    Vital Sign                         Value                    

     Date           

                          Comments                         Source               

     

 

                    Weight                         252                          

2020          

                                                    Enayet Rahim                

    

 

                    Height                         70                          1

2020           

                                                    Enayet Rahim                

    

 

                    Temperature Oral (F)                         97.4 F         

                

2020                                                   Enayet Rahim       

 

            

 

                    Diastolic (mm Hg)                         72                

          2020

                                                    Enayet Rahim                

  

  

 

                    Systolic (mm Hg)                         128                

          2020

                                                    Enayet Rahim                

  

  

 

                    Weight                         254                          

11/10/2020          

                                                    Enayet Rahim                

    

 

                    Height                         70                          1

1/10/2020           

                                                    Enayet Rahim                

    

 

                    Temperature Oral (F)                         98 F           

              

11/10/2020                                                   Enayet Rahim       

 

            

 

                    Diastolic (mm Hg)                         73                

          11/10/2020

                                                    Enayet Rahim                

  

  

 

                    Systolic (mm Hg)                         126                

          11/10/2020

                                                    Enayet Rahim                

  

  

 

                    Weight                         257                          

10/27/2020          

                                                    Enayet Rahim                

    

 

                    Height                         70                          1

           

                                                    Enayet Rahim                

    

 

                    Temperature Oral (F)                         97.5 F         

                

10/27/2020                                                   Enayet Rahim       

 

            

 

                    Diastolic (mm Hg)                         84                

          10/27/2020

                                                    Enayet Rahim                

  

  

 

                    Systolic (mm Hg)                         148                

          10/27/2020

                                                    Enayet Rahim                

  

  

 

                    Weight                         250                          

2020          

                                                    Koffi Barriga MD, PA        

            

 

                    Heart Rate                         70                       

   2020       

                                                    Koffi Barriga MD, PA        

         

   

 

                    Diastolic (mm Hg)                         83                

          2020

                                                    Koffi Barriga MD, PA        

  

          

 

                    Systolic (mm Hg)                         119                

          2020

                                                    Koffi Barriga MD, PA        

  

          

 

                    Weight                         245                          

2020          

                                                    Enayet Rahim                

    

 

                    Height                         70                          0

2020           

                                                    Enayet Rahim                

    

 

                    Temperature Oral (F)                         97.3 F         

                

2020                                                   Enayet Rahim       

 

            

 

                    Diastolic (mm Hg)                         90                

          2020

                                                    Enayet Rahim                

  

  

 

                    Systolic (mm Hg)                         147                

          2020

                                                    Enayet Rahim                

  

  

 

                    Weight                         255                          

2020          

                                                    Koffi Barriga MD, PA        

            

 

                    Heart Rate                         75                       

   2020       

                                                    Koffi Barriga MD, PA        

         

   

 

                    Diastolic (mm Hg)                         65                

          2020

                                                    Koffi Barriga MD, PA        

  

          

 

                    Systolic (mm Hg)                         110                

          2020

                                                    Koffi Barriga MD, PA        

  

          

 

                    Weight                         247                          

2020          

                                                    Enayet Rahim                

    

 

                    Height                         70                          0

3/16/2020           

                                                    Enayet Rahim                

    

 

                    Temperature Oral (F)                         97.5 F         

                

2020                                                   Enayet Rahim       

 

            

 

                    Diastolic (mm Hg)                         69                

          2020

                                                    Enayet Rahim                

  

  

 

                    Systolic (mm Hg)                         124                

          2020

                                                    Enayet Rahim                

  

  

 

                    Weight                         255                          

2020          

                                                    Koffi Barriga MD, PA        

            

 

                    Heart Rate                         79                       

   2020       

                                                    Koffi Barriga MD, PA        

         

   

 

                    Diastolic (mm Hg)                         62                

          2020

                                                    Koffi Barriga MD, PA        

  

          

 

                    Systolic (mm Hg)                         110                

          2020

                                                    Koffi Barriga MD, PA        

  

          

 

                    Weight                         260                          

2020          

                                                    Koffi Barriga MD, PA        

            

 

                    Heart Rate                         76                       

   2020       

                                                    Koffi Barriga MD, PA        

         

   

 

                    Diastolic (mm Hg)                         67                

          2020

                                                    Koffi Barriga MD, PA        

  

          

 

                    Systolic (mm Hg)                         118                

          2020

                                                    Koffi Barriga MD, PA        

  

          

 

                    Weight                         248                          

2019          

                                                    Enayet Rahim                

    

 

                    Height                         70                          1

2019           

                                                    Enayet Rahim                

    

 

                    Temperature Oral (F)                         97.5 F         

                

2019                                                   Enayet Rahim       

 

            

 

                    Diastolic (mm Hg)                         58                

          2019

                                                    Enayet Rahim                

  

  

 

                    Systolic (mm Hg)                         92                 

         2019 

                                                    Enayet Rahim                

   

 

 

                    Weight                         255                          

10/02/2019          

                                                    Koffi Barriga MD, PA        

            

 

                    Heart Rate                         59                       

   10/02/2019       

                                                    Koffi Barriga MD, PA        

         

   

 

                    Diastolic (mm Hg)                         67                

          10/02/2019

                                                    Koffi Barriga MD, PA        

  

          

 

                    Systolic (mm Hg)                         125                

          10/02/2019

                                                    Koffi Barriga MD, PA        

  

          

 

                    Weight                         248                          

2019          

                                                    Enayet Rahim                

    

 

                    Height                         70                          0

2019           

                                                    Enayet Rahim                

    

 

                    Temperature Oral (F)                         98.2 F         

                

2019                                                   Enayet Rahim       

 

            

 

                    Diastolic (mm Hg)                         87                

          2019

                                                    Enayet Rahim                

  

  

 

                    Systolic (mm Hg)                         155                

          2019

                                                    Enayet Rahim                

  

  

 

                    Weight                         247                          

2019          

                                                    Enayet Rahim                

    

 

                    Height                         70                          0

2019           

                                                    Enayet Rahim                

    

 

                    Temperature Oral (F)                         97.0 F         

                

2019                                                   Enayet Rahim       

 

            

 

                    Diastolic (mm Hg)                         78                

          2019

                                                    Enayet Rahim                

  

  

 

                    Systolic (mm Hg)                         126                

          2019

                                                    Enayet Rahim                

  

  

 

                    Weight                         255                          

2019          

                                                    Koffi Barriga MD, PA        

            

 

                    Heart Rate                         66                       

   2019       

                                                    Koffi Barriga MD, PA        

         

   

 

                    Diastolic (mm Hg)                         60                

          2019

                                                    Koffi Barriga MD, PA        

  

          

 

                    Systolic (mm Hg)                         129                

          2019

                                                    Koffi Barriga MD, PA        

  

          

 

                    Weight                         255                          

2018          

                                                    Enayet Rahim                

    

 

                    Height                         70                          1

2018           

                                                    Enayet Rahim                

    

 

                    Temperature Oral (F)                         102.1 F        

                 

2018                                                   Enayet Rahim       

 

            

 

                    Diastolic (mm Hg)                         74                

          2018

                                                    Enayet Rahim                

  

  

 

                    Systolic (mm Hg)                         111                

          2018

                                                    Enayet Rahim                

  

  

 

                    Weight                         258                          

2018          

                                                    Enayet Rahim                

    

 

                    Height                         70                          1

2018           

                                                    Enayet Rahim                

    

 

                    Temperature Oral (F)                         98.6 F         

                

2018                                                   Enayet Rahim       

 

            

 

                    Diastolic (mm Hg)                         67                

          2018

                                                    Enayet Rahim                

  

  

 

                    Systolic (mm Hg)                         107                

          2018

                                                    Enayet Rahim                

  

  

 

                    Weight                         258                          

10/23/2018          

                                                    Koffi Barriga MD, PA        

            

 

                    Heart Rate                         63                       

   10/23/2018       

                                                    Koffi Barriga MD, PA        

         

   

 

                    Diastolic (mm Hg)                         80                

          10/23/2018

                                                    Koffi Barriga MD, PA        

  

          

 

                    Systolic (mm Hg)                         141                

          10/23/2018

                                                    Koffi Barriga MD, PA        

  

          

 

                    Weight                         258                          

10/18/2018          

                                                    Enayet Rahim                

    

 

                    Height                         70                          1

           

                                                    Enayet Rahim                

    

 

                    Temperature Oral (F)                         98.7 F         

                

10/18/2018                                                   Enayet Rahim       

 

            

 

                    Diastolic (mm Hg)                         66                

          10/18/2018

                                                    Enayet Rahim                

  

  

 

                    Systolic (mm Hg)                         116                

          10/18/2018

                                                    Enayet Rahim                

  

  

 

                    Weight                         260                          

2018          

                                                    Koffi Barriga MD, PA        

            

 

                    Heart Rate                         84                       

   2018       

                                                    Koffi Barriga MD, PA        

         

   

 

                    Diastolic (mm Hg)                         70                

          2018

                                                    Koffi Barriga MD, PA        

  

          

 

                    Systolic (mm Hg)                         110                

          2018

                                                    Koffi Barriga MD, PA        

  

          

 

                    Weight                         270                          

2018          

                                                    Koffi Barriga MD, PA        

            

 

                    Heart Rate                         92                       

   2018       

                                                    Koffi Barriga MD, PA        

         

   

 

                    Diastolic (mm Hg)                         80                

          2018

                                                    Koffi Barriga MD, PA        

  

          

 

                    Systolic (mm Hg)                         140                

          2018

                                                    Koffi Barriga MD, PA        

  

          

 

                    Weight                         263                          

2018          

                                                    Enayet Rahim                

    

 

                    Height                         70                          0

2018           

                                                    Enayet Rahim                

    

 

                    Temperature Oral (F)                         98.4 F         

                

2018                                                   Enayet Rahim       

 

            

 

                    Diastolic (mm Hg)                         89                

          2018

                                                    Enayet Rahim                

  

  

 

                    Systolic (mm Hg)                         150                

          2018

                                                    Enayet Rahim                

  

  

 

                    Weight                         265                          

2018          

                                                    Koffi Barriga MD, PA        

            

 

                    Heart Rate                         77                       

   2018       

                                                    Koffi Barriga MD, PA        

         

   

 

                    Diastolic (mm Hg)                         88                

          2018

                                                    Koffi Barriga MD, PA        

  

          

 

                    Systolic (mm Hg)                         140                

          2018

                                                    Koffi Barriga MD, PA        

  

          

 

                    Weight                         266                          

2017          

                                                    Enayet Rahim                

    

 

                    Height                         70                          1

2017           

                                                    Enayet Rahim                

    

 

                    Temperature Oral (F)                         97.5 F         

                

2017                                                   Enayet Rahim       

 

            

 

                    Diastolic (mm Hg)                         91                

          2017

                                                    Enayet Rahim                

  

  

 

                    Systolic (mm Hg)                         164                

          2017

                                                    Enayet Rahim                

  

  

 

                    Weight                         265                          

10/05/2017          

                                                    Koffi Barriga MD, PA        

            

 

                    Heart Rate                         78                       

   10/05/2017       

                                                    Koffi Barriga MD, PA        

         

   

 

                    Diastolic (mm Hg)                         65                

          10/05/2017

                                                    Koffi Barriga MD, PA        

  

          

 

                    Systolic (mm Hg)                         138                

          10/05/2017

                                                    Koffi Barriga MD, PA        

  

          

 

                    Weight                         261                          

2017          

                                                    Enayet Rahim                

    

 

                    Height                         70                          0

2017           

                                                    Enayet Rahim                

    

 

                    Temperature Oral (F)                         98.3 F         

                

2017                                                   Enayet Rahim       

 

            

 

                    Diastolic (mm Hg)                         67                

          2017

                                                    Enayet Rahim                

  

  

 

                    Systolic (mm Hg)                         109                

          2017

                                                    Enayet Rahim                

  

  

 

                    Weight                         265                          

2017          

                                                    Koffi Barriga MD, PA        

            

 

                    Heart Rate                         76                       

   2017       

                                                    Koffi Barriga MD, PA        

         

   

 

                    Diastolic (mm Hg)                         85                

          2017

                                                    Koffi Barriga MD, PA        

  

          

 

                    Systolic (mm Hg)                         133                

          2017

                                                    Koffi Barriga MD, PA        

  

          

 

                    Weight                         264                          

2016          

                                                    Enayet Rahim                

    

 

                    Height                         70                          0

2016           

                                                    Enayet Rahim                

    

 

                    Temperature Oral (F)                         99.1 F         

                

2016                                                   Enayet Rahim       

 

            

 

                    Diastolic (mm Hg)                         55                

          2016

                                                    Enayet Rahim                

  

  

 

                    Systolic (mm Hg)                         140                

          2016

                                                    Enayet Rahim                

  

  

 

                    Weight                         270                          

2016          

                                                    Koffi Barriga MD, PA        

            

 

                    Heart Rate                         85                       

   2016       

                                                    Koffi Barriga MD, PA        

         

   

 

                    Diastolic (mm Hg)                         85                

          2016

                                                    Koffi Barriga MD, PA        

  

          

 

                    Systolic (mm Hg)                         115                

          2016

                                                    Koffi Barriga MD, PA        

  

          

 

                    Weight                         270                          

2016          

                                                    Koffi Barriga MD, PA        

            

 

                    Heart Rate                         78                       

   2016       

                                                    Koffi Barriga MD, PA        

         

   

 

                    Diastolic (mm Hg)                         70                

          2016

                                                    Koffi Barriga MD, PA        

  

          

 

                    Systolic (mm Hg)                         125                

          2016

                                                    Koffi Barriga MD, PA        

  

          



                                                                                
                                                                                
                                                                                
                                                                                
                                                                                
                                                                                
                                                                                
                                                                                
                                                                                
                                                                                
                                                                                
                                                                                
                                                                                
                                                                                
                                                                                
                                                                                
                                                                                
                                                                                
                                                                                
                                                                                
                                                                                
                                                                                
                                                                                
                                                                                
                                                                                
                                                                                
                                                                                
        



Encounters

                    



                    Location                         Location Details           

              

Encounter Type                         Encounter Number                         

Reason For Visit                         Attending Provider                     

                    ADM Date                         DC Date                    

     Status     

                                        Source                    

 

                                                                  AUDIT         

                

[id number]                                                                        

 

                    2013               

               

                                        [facility]                    

 

                                                                  AUDIT         

                

[id number]                                                                        

 

                    2013               

               

                                        [facility]                    

 

                                                                      E30, Provi

jackie: FARHAT,DONAVAN, 

Status: Pen, Time: 10:15 AM                         [id number]                    

                                                                        20

13             

                    2013                                                  

[facility]                    

 

                                                                  AUDIT         

                

[id number]                                                                        

 

                    2013               

               

                                        [facility]                    

 

                                                                      E30, Provi

jackie: FARHAT,DONAVAN, 

Status: Pen, Time: 9:45 AM                         [id number]                     

                                                                        20

13              

                    2013                                                  

[facility]                    

 

                                                                  AUDIT         

                

[id number]                                                                        

 

                    2013               

               

                                        [facility]                    

 

                                                                  AUDIT         

                

[id number]                                                                        

 

                    2013               

               

                                        [facility]                    

 

                                                                      E30, Provi

jackie: FARHAT,DONAVAN, 

Status: Pen, Time: 1:00 PM                         [id number]                     

                                                                        20

13              

                    2013                                                  

[facility]                    

 

                                                                  AUDIT         

                

[id number]                                                                        

 

                    2013               

               

                                        [facility]                    

 

                                                                  AUDIT         

                

[id number]                                                                        

 

                    2014               

               

                                        [facility]                    

 

                                                                  AUDIT         

                

[id number]                                                                        

 

                    2014               

               

                                        [facility]                    

 

                                                                      E30, Provi

jackie: FARHAT,DONAVAN, 

Status: Pen, Time: 10:15 AM                         [id number]                    

                                                                        20

14             

                    2014                                                  

[facility]                    

 

                                                                      WME, Provi

jackie: TORIE MARINO, 

Status: Pen, Time: 10:15 AM                         [id number]                    

                                                                        20

14             

                    2014                                                  

[facility]                    

 

                                                                      E30, Provi

jackie: FARHAT,DONAVAN, 

Status: Pen, Time: 1:00 PM                         [id number]                     

                                                                        20

14              

                    2014                                                  

[facility]                    

 

                    [facility]                                     

             TROUBLE

 URINATING                              [id number]    

     

                                                                        20

15  

                          2015                                            

   

                                        Koffi Barriga MD, PA                    

 

                    [facility]                                     

             TROUBLE

 URINATING                              [id number]    

     

                                                                        20

15  

                          2015                                            

   

                                        Koffi Barriga MD, PA                    

 

                    [facility]                                     

             TROUBLE

 URINATING                              [id number]    

     

                                                                        20

15  

                          2015                                            

   

                                        Vivian Cedillo                    

 

                    [facility]                                     

             TROUBLE

 URINATING                              [id number]    

     

                                                                        20

15  

                          2015                                            

   

                                        Koffi Barriga MD, PA                    

 

                    [facility]                                     

             TROUBLE

 URINATING                              [id number]    

     

                                                                        20

15  

                          2015                                            

   

                                        Koffi Barriga MD, PA                    

 

                    [facility]                                     

             Unknown

                                        [id number]    

               

                                                                        20

15            

                    2015                                                  

Koffi Barriga MD, PA                    

 

                    [facility]                                     

             Unknown

                                        [id number]    

               

                                                                        20

15            

                    2015                                                  

Koffi Barriga MD, PA                    

 

                    [facility]                                     

             Unknown

                                        [id number]    

               

                                                                        20

15            

                    2015                                                  

[facility]                                     

             [facility]

                                        [id number]    

               

                                                                        20

15            

                    2015                                                  

Koffi Barriga MD, PA                    

 

                    [facility]                                     

             Unknown

                                        [id number]    

               

                                                                        20

15            

                    2015                                                  

Koffi Barriga MD, PA                    

 

                    Koffi Barriga MD, PA                                        

          Follow-Up 

                                        [id number]    

                

                                                                        20

16             

                    2016                                                  

Koffi Barriga MD, PA                    

 

                    Koffi Barriga MD, PA                                        

          Follow-Up 

                                        [id number]    

                

                                                                        20

16             

                    2016                                                  

Koffi Barriga MD, PA                    

 

                    Koffi Barriga MD, PA                                        

          Follow-Up 

                                        [id number]    

                

                                                                        20

16             

                    2016                                                  

Vivian Barriga MD, PA                                        

          Follow-Up 

                                        [id number]    

                

                                                                        20

16             

                    2016                                                  

Koffi Barriga MD, PA                    

 

                    Koffi Barriga MD, PA                                        

          Follow-Up 

                                        [id number]    

                

                                                                        20

16             

                    2016                                                  

Koffi Barriga MD, PA                    

 

                    Koffi Barriga MD, PA                                        

          Follow-Up 

                                        [id number]    

                

                                                                        20

16             

                    2016                                                  

Koffi Barriga MD, PA                    

 

                    Koffi aBrriga MD, PA                                        

          

echo/carotid/arterial dopplers                         

[id number]                                            

                                               2016  

                                                    Koffi Barriga MD, PA        

    

        

 

                    Koffi Barriga MD, PA                                        

          

echo/carotid/arterial dopplers                         

[id number]                                            

                                               2016  

                                                    Koffi Barriga MD, PA        

    

        

 

                    Koffi Barriga MD, PA                                        

          Follow-Up 

                                        [id number]    

                

                                                                        20

16             

                    2016                                                  

Vivian Barriga MD, PA                                        

          Follow-Up 

                                        [id number]    

                

                                                                        20

16             

                    2016                                                  

Koffi Barriga MD, PA                    

 

                    Koffi Barriga MD, PA                                        

          

echo/carotid/arterial dopplers                         

[id number]                                            

                                               2016  

                                                    Vivian Barriga MD, PA                                        

          

echo/carotid/arterial dopplers                         

[id number]                                            

                                               2016  

                                                    Koffi Barriga MD, PA        

    

        

 

                    Vivian Cedillo MD, PA                                        

          NEW PT    

                                        [id number]    

                   

                                                                        20

16                

                    2016                                                  

Vivian Barriga MD, PA                                        

          Follow-Up 

                                        [id number]    

                

                                                                        20

17             

                    2017                                                  

Vivian Barriga MD, PA                                        

          Follow-Up 

                                        [id number]    

                

                                                                        20

17             

                    2017                                                  

Koffi Barriga MD, PA                    



                                                                                
                                                                                
                                                                                
                                                                                
                                                                                
                                                                                
                                                



Procedures

        



                                        No Data Provided for This Section



                                                    



Assessment and Plan

                    



                                        No Data Provided for This Section       

             



                                    



Plan of Care





                    Plan of Care        Date                Source

 

                                        [QLH] TSH, 3RD GENERATION 2013 [facility]

teri[QLH] T4, FREE 2013 

Routine[QLH] CMP W/EGFR 2013 Routine[QL] VITAMIN D, 25-HYDROXY, LC/MS/MS 
2013 Routine                         2013                         [facility]                    



                                                                        



Social History

                    



                    Social History                         Date                 

        Source      

              

 

                                        Social History ElementQualifiersDate Rep

orted

Tobacco Use:

                                        .  Are you a: never smoker

2017

Marital Status:

                                        .  

2017

Do you drink alcohol?

                                        .  Status: No

2017                         Koffi Barriga MD, P

A

                    

 

                                        Social History ElementQualifiersDate Rep

orted

Tobacco Use:

                                        .  Are you a: Never Smoker

Aug 19, 2016

Marital Status:

                                        .  

Aug 19, 2016

Caffeine intake?

                                        .  Status: Yes, What type: Tea

Aug 19, 2016

Do you exercise?

                                        .  Answer: No

Aug 19, 2016

Do you drink alcohol?

                                        .  Do you drink alcohol? No

Aug 19, 2016

Travel outside [place]:

                                        .  NO

Aug 19, 2016

Occupation:

                                        .  retired

Aug 19, 2016

                          2016                         Vivian Cedillo       

 

            

 

                                        Never A Smoker        (Active)     Never

 Drank Alcohol        (Active)     

Marital History -  (V61.03);         (Active)                           
                          2014                         [facility]      

              



                                                                                
                        



Family History

                    



                    Value                         Date                         S

ource               

     

 

                                        QualifierDescriptionCommentDate Reported

Maternal Grandmother

Comment not available

Aug 19, 2016

Paternal Grandmother

Comment not available

Aug 19, 2016

Children

Comment not available

Aug 19, 2016

Maternal Grandfather

Comment not available

Aug 19, 2016

Father



Comment not available

Aug 19, 2016

Brother(s)

Comment not available

Aug 19, 2016

Mother



Comment not available

Aug 19, 2016

Paternal Grandfather

Comment not available

Aug 19, 2016

Sister(s)

alive

Comment not available

Aug 19, 2016

Other:

Comment not available

Aug 19, 2016

General Family History

Comment not available

Aug 19, 2016

                          2017                         [facility] Cedillo       

 

            

 

                                        Family history of Coronary Artery Diseas

e (V17.49);         (Active)     Family 

history of Cerebral Artery Occlusion        (Active)     Family history of 
Diabetes Mellitus (V18.0);         (Active)     Family history of Hypertension 
(V17.49);         (Active)     Family history of Hyperlipidemia        (Active) 
    No Family history of Pancreatitis        (Denied)     No Family history of 
Thyroid Cancer        (Denied)     No Family history of Bladder Cancer        
(Denied)                              2014                         [facility]                    

 

                                        Family history of Coronary Artery Diseas

e (V17.49);         (Active)     Family 

history of Cerebral Artery Occlusion        (Active)     Family history of 
Diabetes Mellitus (V18.0);         (Active)     Family history of Hypertension 
(V17.49);         (Active)     Family history of Hyperlipidemia        (Active) 
    No Family history of Pancreatitis        (Denied)     No Family history of 
Thyroid Cancer        (Denied)                              2014          

                                        [facility]                    

 

                                        Family history of Coronary Artery Diseas

e (V17.49);         (Active)     Family 

history of Cerebral Artery Occlusion        (Active)     Family history of 
Diabetes Mellitus (V18.0);         (Active)     Family history of Hypertension 
(V17.49);         (Active)     Family history of Hyperlipidemia        (Active) 
    No Family history of Pancreatitis        (Denied)     No Family history of 
Thyroid Cancer        (Denied)                              2013          

                                        [facility]                    

 

                                        Family history of Coronary Artery Diseas

e (V17.49);         (Active)     Family 

history of Cerebral Artery Occlusion        (Active)     Family history of 
Diabetes Mellitus (V18.0);         (Active)     Family history of Hypertension 
(V17.49);         (Active)     Family history of Hyperlipidemia        (Active) 
    No Family history of Pancreatitis        (Denied)                           
                          2013                         [facility]      

              

 

                                        Family history of Coronary Artery Diseas

e (V17.49);         (Active)     Family 

history of Cerebral Artery Occlusion        (Active)     Family history of 
Diabetes Mellitus (V18.0);         (Active)     Family history of Hypertension 
(V17.49);         (Active)     Family history of Hyperlipidemia        (Active) 
    No Family history of Pancreatitis        (Denied)                           
                          2013                         [facility]      

              

 

                                        Family history of Coronary Artery Diseas

e (V17.49);         (Active)     Family 

history of Cerebral Artery Occlusion        (Active)     Family history of 
Diabetes Mellitus (V18.0);         (Active)     Family history of Hypertension 
(V17.49);         (Active)     Family history of Hyperlipidemia        (Active) 
    No Family history of Pancreatitis        (Denied)                           
                          2013                         [facility]      

              

 

                                        Family history of Coronary Artery Diseas

e (V17.49);         (Active)     Family 

history of Cerebral Artery Occlusion        (Active)     Family history of 
Diabetes Mellitus (V18.0);         (Active)     Family history of Hypertension 
(V17.49);         (Active)     Family history of Hyperlipidemia        (Active) 
    No Family history of Pancreatitis        (Denied)                           
                          2013                         [facility]      

              

 

                                        Family history of Cerebral Artery Occlus

ion        (Active)     Family history 

of Coronary Artery Disease (V17.49);         (Active)     Family history of 
Diabetes Mellitus (V18.0);         (Active)     Family history of Hypertension 
(V17.49);         (Active)     Family history of Hyperlipidemia        (Active) 
                             2013                         [facility]   

                 



                                                                                
                                                                                
                                        



Advance Directives

                    



                    Order Name                         Results                  

       Value        

                          Date                         Source                   

 

 

                          Advance Directives                         Advance Dir

ectives                   

                          No Advance Directives available.                      

   2014       

                                        [facility]                    

 

                          Advance Directives                         Advance Dir

ectives                   

                          No Advance Directives available.                      

   2014       

                                        [facility]                    

 

                          Advance Directives                         Advance Dir

ectives                   

                          No Advance Directives available.                      

   2013       

                                        [facility]                    

 

                          Advance Directives                         Advance Dir

ectives                   

                          No Advance Directives available.                      

   2013       

                                        [facility]                    

 

                          Advance Directives                         Advance Dir

ectives                   

                          No Advance Directives available.                      

   2013       

                                        [facility]                    

 

                          Advance Directives                         Advance Dir

ectives                   

                          No Advance Directives available.                      

   2013       

                                        [facility]                    

 

                          Advance Directives                         Advance Dir

ectives                   

                          No Advance Directives available.                      

   2013       

                                        [facility]                    

 

                          Advance Directives                         Advance Dir

ectives                   

                          No Advance Directives available.                      

   2013       

                                        [facility]                    



                                                                                
                                                                                
                    



Functional Status

                    



                                        No Data Provided for This Section

## 2020-11-28 NOTE — EMERGENCY DEPARTMENT NOTE
History of Present Illnes


History of Present Illness


Chief Complaint:  Extremity Trauma/Pain


History of Present Illness


This is a 75 year old  male pt came in via [facility] for c/o left 

leg pain, pt fell 10 days ago at the grocery store after a syncope episode, pt 

noticed the bruising and swelling to his left leg about 3 days ago, pain 

exacerbated by weight bearing and movement, pt takes Plavix and Eliquis for A-

Fib.


Historian:  Patient, Paramedic/EMS


Arrival Mode:  [facility]


 Required:  No


Onset (how long ago):  day(s) (10)


Location:  left leg


Quality:  pain, swelling


Radiation:  Reports non-radiation


Severity:  moderate


Onset quality:  gradual


Timing of current episode:  constant


Progression:  worsening


Chronicity:  new


Context:  Denies recent illness


Relieving factors:  none


Exacerbating factors:  none


Associated symptoms:  Reports denies other symptoms





Past Medical/Family History


Physician Review


I have reviewed the patient's past medical and family history.  Any updates have

been documented here.





Past Medical History


Recent Fever:  No


Clinical Suspicion of Infectio:  No


New/Unexplained Change in Ment:  No


Past Medical History:  Hypertension, Diabetes, CHF, GERD, Hyperlipedemia


Other Medical History:  


carotid disease


Other Surgery:  


TONSILS





KNEES





Social History


Smoking Cessation:  Never Smoker


Counseling Performed:  No


Alcohol Use:  None


Any Illegal Drug Use:  No


TB Exposure/Symptoms:  No


Physically hurt or threatened:  No





Family History


Family history of heart diseas:  No





Other


Last Tetanus:  UNKNOWN


Any Pre-Existing Lines (PICC,:  No





Review of Systems


Review of Systems


Constitutional:  Reports no symptoms


EENTM:  Reports no symptoms


Cardiovascular:  Reports no symptoms


Respiratory:  Reports no symptoms


Gastrointestinal:  Reports no symptoms


Genitourinary:  Reports no symptoms


Musculoskeletal:  Reports as per HPI


Integumentary:  Reports no symptoms


Neurological:  Reports no symptoms


Psychological:  Reports no symptoms


Endocrine:  Reports no symptoms


Hematological/Lymphatic:  Reports no symptoms





Physical Exam


Related Data


Allergies:  


Coded Allergies:  


     No Known Allergies (Unverified , 3/1/18)


Triage Vital Signs





Vital Signs








  Date Time  Temp Pulse Resp B/P (MAP) Pulse Ox O2 Delivery O2 Flow Rate FiO2


 


11/28/20 12:41      Room Air  


 


11/28/20 12:55 98.9 80 16  98  2.0 








Vital signs reviewed:  Yes





Physical Exam


CONSTITUTIONAL





Constitutional:  Present well-developed, Present well-nourished


HENT


HENT:  Present normocephalic, Present atraumatic, Present oropharynx 

clear/moist, Present nose normal


HENT L/R:  Present left ext ear normal, Present right ext ear normal


EYES





Eyes:  Reports PERRL, Reports conjunctivae normal


NECK


Neck:  Present ROM normal


PULMONARY


Pulmonary:  Present effort normal, Present breath sounds normal


CARDIOVASCULAR





Cardiovascular:  Present irregular rhythm, Present heart sounds normal, Present 

capillary refill normal, Present normal rate


GASTROINTESTINAL





Abdominal:  Present soft, Present nontender, Present bowel sounds normal


GENITOURINARY





Genitourinary:  Present exam deferred


SKIN


Skin:  Present warm, Present dry


MUSCULOSKELETAL





Musculoskeletal:  Present edema, Present other (left leg with ecchymosis and 

marked swelling compared to right involving entire leg, good distal pulses DP/PT

and good cap refill)


NEUROLOGICAL





Neurological:  Present alert, Present oriented x 3, Present no gross motor or 

sensory deficits


PSYCHOLOGICAL


Psychological:  Present mood/affect normal, Present judgement normal





Results


Laboratory


Result Diagram:  


11/28/20 1253                                                                   

            11/28/20 1253





Laboratory





Laboratory Tests








Test


 11/28/20


14:09 11/28/20


12:53


 


Urine Color


 Yellow


(YELLOW) 





 


Urine Clarity


 Sl cloudy


(CLEAR) 





 


Urine pH 5.5 (5 - 7)  


 


Urine Specific Gravity


 >=1.030


(1.010-1.025) 





 


Urine Protein


 Negative


(NEGATIVE) 





 


Urine Glucose (UA)


 Negative


(NEGATIVE) 





 


Urine Ketones


 Trace


(NEGATIVE) 





 


Urine Blood


 Trace


(NEGATIVE) 





 


Urine Nitrite


 Negative


(NEGATIVE) 





 


Urine Bilirubin


 Negative


(NEGATIVE) 





 


Urine Urobilinogen


 1 mg/dL (0.2 -


1) 





 


Urine Leukocyte Esterase


 Negative


(NEGATIVE) 





 


Urine RBC


 6-10 /HPF


(0-5) 





 


Urine WBC 0-5 /HPF (0-5)  


 


Urine Epithelial Cells


 Few /LPF


(NONE) 





 


Urine Bacteria


 Rare /HPF


(NONE) 





 


White Blood Count


 


 9.87 x10e3/uL


(4.8-10.8)


 


Red Blood Count


 


 3.60 x10e6/uL


(4.3-5.7)


 


Hemoglobin


 


 11.8 g/dL


(14.0-18.0)


 


Hematocrit


 


 37.2 %


(38.2-49.6)


 


Mean Corpuscular Volume


 


 103.3 fL


(81-99)


 


Mean Corpuscular Hemoglobin


 


 32.8 pg


(28-32)


 


Mean Corpuscular Hemoglobin


Concent 


 31.7 g/dL


(31-35)


 


Red Cell Distribution Width


 


 16.6 %


(11.7-14.4)


 


Platelet Count


 


 293 x10e3/uL


(140-360)


 


Neutrophils (%) (Auto)


 


 73.6 %


(38.7-80.0)


 


Lymphocytes (%) (Auto)


 


 14.7 %


(18.0-39.1)


 


Monocytes (%) (Auto)


 


 10.2  %


(4.4-11.3)


 


Eosinophils (%) (Auto)


 


 0.3 %


(0.0-6.0)


 


Basophils (%) (Auto)


 


 0.3 %


(0.0-1.0)


 


Neutrophils # (Auto)  7.3 (2.1-6.9) 


 


Lymphocytes # (Auto)  1.5 (1.0-3.2) 


 


Monocytes # (Auto)  1.0 (0.2-0.8) 


 


Eosinophils # (Auto)  0.0 (0.0-0.4) 


 


Basophils # (Auto)  0.0 (0.0-0.1) 


 


Absolute Immature Granulocyte


(auto 


 0.09 x10e3/uL


(0-0.1)


 


Prothrombin Time


 


 15.7 seconds


(11.9-14.5)


 


Prothromb Time International


Ratio 


 1.19 





 


Activated Partial


Thromboplast Time 


 26.3 seconds


(23.8-35.5)


 


Sodium Level


 


 142 mmol/L


(136-145)


 


Potassium Level


 


 4.5 mmol/L


(3.5-5.1)


 


Chloride Level


 


 104 mmol/L


()


 


Carbon Dioxide Level


 


 30 mmol/L


(22-29)


 


Anion Gap


 


 12.5 mmol/L


(8-16)


 


Blood Urea Nitrogen


 


 32 mg/dL


(7-26)


 


Creatinine


 


 1.18 mg/dL


(0.72-1.25)


 


Estimat Glomerular Filtration


Rate 


 60 ML/MIN


(60-)


 


BUN/Creatinine Ratio  27 (6-25) 


 


Glucose Level


 


 186 mg/dL


()


 


Calcium Level


 


 8.8 mg/dL


(8.4-10.2)


 


Total Bilirubin


 


 2.3 mg/dL


(0.2-1.2)


 


Aspartate Amino Transf


(AST/SGOT) 


 38 IU/L (5-34) 





 


Alanine Aminotransferase


(ALT/SGPT) 


 39 IU/L (0-55) 





 


Alkaline Phosphatase


 


 62 IU/L


()


 


Creatine Kinase


 


 < 7 IU/L


()


 


Creatine Kinase MB


 


 6.80 ng/mL


(0-5.0)


 


Troponin I


 


 0.063 ng/mL


(0-0.300)


 


B-Type Natriuretic Peptide


 


 465.1 pg/mL


(0-100)


 


Total Protein


 


 6.5 g/dL


(6.5-8.1)


 


Albumin


 


 3.2 g/dL


(3.5-5.0)


 


Globulin


 


 3.3 g/dL


(2.3-3.5)


 


Albumin/Globulin Ratio  1.0 (0.8-2.0) 








Lab results reviewed:  Yes





Imaging


Imaging results reviewed:  Yes


Impressions


EXAM: CT of the  site without contrast





INDICATION: Status post fall with left lower extremity pain and difficulty


bearing weight.





COMPARISON: None available.





TECHNIQUE: Multidetector CT scanning of the site  was performed. Coronal and


sagittal multiplanar reformations were obtained.





RADIATION DOSE: 


     Total DLP:     mGy*cm


     Estimated effective dose: (DLP x 0.014 x size factor) mSv


     CTDIvol has been reviewed. It is below the limits set by the Radiation


Protocol Committee (RPC).


     Dose modulation, iterative reconstruction, and/or weight based adjustment


of the mA/kV was utilized to reduce the radiation dose to as low as reasonably


achievable. 








FINDINGS: 





Bones: No acute displaced fracture identified.





Joints: There are mild to moderate degenerative changes of the left hip


characterized by joint space narrowing and marginal osteophytes. There are


severe tricompartmental degenerative changes of the left knee characterized by


severe joint space narrowing, subchondral cystic changes, subchondral sclerosis


and marginal/fragmented osteophytes.





Soft Tissues: There is ill-defined mild prominence of the mid deep quadriceps


muscles suggestive of intramuscular hematoma. No evidence of active


extravasation. There is moderate to severe atherosclerotic calcification of the


lower extremity vasculature.





Others: The partially imaged pelvis is normal.





IMPRESSION:


1.  Ill-defined mild prominence of the mid deep quadriceps muscles suggestive


of intramuscular hematoma. No evidence of active extravasation.


2.  No acute fracture or evidence of dislocation.


3.  Severe tricompartmental osteoarthritis of the left knee.





Signed by: Leatha Gonzalez MD on 11/28/2020 3:51 PM


______________________________________________





EXAMINATION:  CHEST SINGLE (PORTABLE)    





INDICATION:      


^FALL 9 D AGO


^[id number]


^1415





COMPARISON:  Multiple prior chest radiograph including most recent on


12/20/2018. Chest CT on 12/13/2018.


     


FINDINGS:    





TUBES and LINES:  None.





LUNGS:  Low lung volumes with bronchovascular crowding. There is diffuse patchy


airspace opacities and interstitial prominence bilaterally.





PLEURA:  No pleural effusion or pneumothorax. Left pleural scarring, unchanged.





HEART AND MEDIASTINUM:  The cardiomediastinal silhouette is unremarkable. There


are atherosclerotic calcifications within the aorta.





BONES AND SOFT TISSUES:  No acute osseous lesion.  Soft tissues are


unremarkable.





UPPER ABDOMEN: No free air under the diaphragm.    





IMPRESSION: 





Diffuse interstitial and patchy airspace opacities which may represent


combination of bronchovascular crowding and subsegmental atelectasis. However,


multifocal pneumonia can have a similar appearance and should be considered in


the proper clinical context.








Signed by: Leatha Gonzalez MD on 11/28/2020 3:41 PM





Procedures


12 Lead ECG Interpretation


ECG Interpretation :  


   ECG:  ECG 1


   :  Interpreted by ED physician


   Date:  Nov 28, 2020


   Time:  12:53


   Rhythm:  sinus rhythm (1st degree avb)


   Ectopy:  multifocal PVC's


   Rate:  normal


   BPM:  78


   QRS axis:  normal


   T wave inversion:  II, III, aVF, V3, V4, V5, V6


   Other findings:  prolonged QTc interval


   Clinical Impression:  abnormal ECG





Assessment & Plan


Medical Decision Making


MDM


swelling/pain left leg after fall 10 days ago on Eliquis, had syncope at that 

time - check cbc, chem, coag's, cardiacs, ecg, ct brain, ct left leg, doppler - 

r/o hematoma of left leg, dvt, renal insuff, a-fib, cerebral bleed





Reassessment


Reassessment


admit to dr guerrero





Assessment & Plan


Final Impression:  


(1) Hematoma of left lower extremity


Depart Disposition:  ADMITTED


Last Vital Signs











  Date Time  Temp Pulse Resp B/P (MAP) Pulse Ox O2 Delivery O2 Flow Rate FiO2


 


11/28/20 15:07  70 19 139/87 95 Nasal Cannula 2.0 


 


11/28/20 12:55 98.9       








Home Meds


Reported Medications


[Eliquis]   No Conflict Check, 5 MG PO BID


   12/13/18


Losartan Potassium (LOSARTAN POTASSIUM) 50 Mg Tablet, 50 MG PO DAILY


   12/13/18


Metoprolol Succinate (METOPROLOL SUCCINATE) 100 Mg Tab.er.24h, 100 MG PO DAILY


   12/13/18


Furosemide (FUROSEMIDE) 40 Mg Tablet, 40 MG PO DAILY


   12/13/18


Tamsulosin Hcl (TAMSULOSIN HCL) 0.4 Mg Cap.er.24h, 0.4 MG PO DAILY


   12/13/18


Metformin Hcl (METFORMIN HCL) 500 Mg Tablet, 500 MG PO BID, #60 TAB


   3/1/18


Simvastatin (SIMVASTATIN) 20 Mg Tablet, 20 MG PO 2100, EA


   3/1/18


Medications in the ED





Sodium Chloride 50 ml @ ud STK-MED ONCE .ROUTE ;  Start 11/28/20 at 14:07;  Stop

 11/28/20 at 14:00;  Status DC


Iopamidol 74,000 mg STK-MED ONCE INJ ;  Start 11/28/20 at 14:08;  Stop 11/28/20 

at 14:01;  Status DC











VINCE SUERO MD               Nov 28, 2020 16:23

## 2020-11-28 NOTE — XMS REPORT
Continuity of Care Document

                             Created on: 2020



Godfrey Bryant JR

External Reference #: [id number]

: 1945

Sex: Male



Demographics





                          Address                   [address] [place]  [address]

 

                          Home Phone                [phone]

 

                          Preferred Language        English

 

                          Marital Status            Unknown

 

                          Baptism Affiliation     Unknown

 

                          Race                      Unknown

 

                          Ethnic Group              Unknown





Author





                          Author                    [employer]

 Godfrey Franco

 

                          Organization              [employer]

 

                          Address                   Unknown

 

                          Phone                     Unavailable







Care Team Providers





                    Care Team Member Name Role                Phone

 

                    [employer] Unavailable         Un

available



                                    



Problems

                    



                    Problem                         Status                      

   Onset Date       

                          Classification                         Date Reported  

         

                          Comments                         Source               

     

 

                    Diabetes                         Active                     

                    

                    Problem                         2020                  

            

                                        Enayet Rahim                    

 

                          BPH (benign prostatic hyperplasia)                    

     Active               

                                             Problem                             

                                                    Enayet Rahim                

    

 

                    HTN (hypertension)                         Active           

                    

                          Problem                         2020            

        

                                                    Enayet Rahim                

    

 

                    Carotid arterial disease                         Active     

                    

                          Problem                         2020            

  

                                                    Enayet Rahim                

    

 

                          PAD (peripheral artery disease)                       

  Active                  

                                             Problem                                

                                                    Enayet Rahim                

    

 

                    Pain in left knee                         Active            

                    

                          Diagnosis                         2017          

         

                                                    Enayet Rahim                

    

 

                    Other chronic pain                         Active           

                    

                          Problem                         2020            

        

                                                    Enayet Rahim                

    

 

                    Fall, initial encounter                         Active      

                    

                          Diagnosis                         2020          

   

                                                    Enayet Rahim                

    

 

                          Acute pain of right shoulder                         A

ctive                     

                                             Diagnosis                         0

2018        

                                                    Enayet Rahim                

    

 

                          Chronic diastolic heart failure                       

  Active                  

                                             Problem                         2020       

                                                    Koffi Barriga MD, PA        

          

 

 

                          Occlusion and stenosis of right carotid artery        

                 Active   

                                              Problem                         

2020                                                   Koffi Barriga MD, P

A

                   

 

                          Abnormal electrocardiogram [ECG] [EKG]                

         Active           

                                             Problem                         

2020                                                   Koffi Barriga MD P

A

                    

 

                    Obesity, unspecified                         Active         

                    

                          Problem                         2020            

     

                                                    Koffi Barriga MD, PA        

            

 

                    CHF, chronic systolic                         Active        

                    

                          Problem                         2020            

    

                                                    Koffi Barriga MD, PA        

            

 

                          Body mass index (BMI) 38.0-38.9, adult                

         Active           

                                             Problem                         

2020                                                   Koffi Barriga MD P

A

                    

 

                          Peripheral vascular disease, unspecified              

           Active         

                                             Problem                         

2020                                                   Koffi Barriga MD, P

A

                    

 

                                        Type 2 diabetes mellitus with diabetic p

eripheral angiopathy without gangrene   

                      Active                                                  

Problem                         2020                                      

                                        Koffi Barriga MD, PA                    

 

                          Coronary angioplasty status                         Ac

tive                      

                                             Problem                         2020          

                                                    Koffi Barriga MD, PA        

            

 

                                        Atherosclerotic heart disease of native 

coronary artery without angina pectoris 

                        Active                                                  

Problem                         2020                                      

                                        Koffi Barriga MD, PA                    

 

                          Coronary atherosclerosis of native coronary artery    

                     

Active                                                   Problem                

 

                    2020                                                  

Koffi Barriga MD, PA                    

 

                          Peripheral vascular disease                         Ac

tive                      

                                             Problem                         2020          

                                                    Koffi Barriga MD, PA        

            

 

                          Diabetes mellitus type II                         Acti

ve                        

                                             Problem                         2020            

                                                    Koffi Barriga MD, PA        

            

 

                    Abnormal ECG                         Active                 

                    

                    Problem                         2020                  

       

                                        Koffi Barriga MD, PA                    

 

                    Morbid obesity                         Active               

                    

                          Problem                         2020            

           

                                                    Koffi Barriga MD, PA        

            

 

                          Postprocedural PCI status                         Acti

ve                        

                                             Problem                         2020            

                                                    Koffi Barriga MD, PA        

            

 

                    Mixed hyperlipidemia                         Active         

                    

                          Problem                         2020            

     

                                                    Koffi Barriga MD, PA        

            

 

                    Carotid stenosis                         Active             

                    

                          Problem                         2020            

         

                                                    Koffi Barriga MD, PA        

            

 

                          Chronic systolic CHF (congestive heart failure)       

                  Active  

                                                Problem                         

2020                                                   Enparisaet Nguyen       

 

            

 

                          Well controlled diabetes mellitus                     

    Active                

                                             Problem                             

                                                    Enparisaet Nguyen                

    

 

                          PAF (paroxysmal atrial fibrillation)                  

       Active             

                                             Problem                          

                                                    Enmadison Cedillo                

   

 

 

                          Atrial fibrillation- Chronic                         A

ctive                     

                                             Problem                         2020         

                                                    Koffi Barriga MD, PA        

           

 

 

                          Paroxysmal atrial fibrillation                        

 Active                   

                                             Problem                         2020       

                                                    Koffi Barriga MD, PA        

         

   

 

                          Perforated right tympanic membrane on examination     

                    Active

                                                    Diagnosis                   

  

                    10/24/2018                                                  

Vivian Cedillo    

                

 

                          Body mass index (BMI) 37.0-37.9, adult                

         Active           

                                             Problem                         

2020                                                   Koffi Barriga MD, P

A

                    

 

                          Other bilateral secondary osteoarthritis of knee      

                   Active 

                                                    Diagnosis                   

   

                    2017                                                  

Vivian Cedillo     

               

 

                    Acute tracheobronchitis                         Active      

                    

                          Problem                         2020            

  

                                                    Vivian Cedillo                

    

 

                          Other obesity due to excess calories                  

       Active             

                                             Problem                         2020 

                                                    Koffi Barriga MD, PA        

   

         

 

                    BMI 35.0-35.9,adult                         Active          

                    

                          Problem                         2020            

      

                                                    Vivian Cedillo                

    

 

                    Flu vaccine need                         Active             

                    

                          Diagnosis                         2019          

         

                                                    Vivian Cedillo                

    

 

                    Risk for falls                         Active               

                    

                          Diagnosis                         2020          

           

                                                    Vivian Cedillo                

    

 

                    Eye exam abnormal                         Active            

                    

                          Diagnosis                         09/10/2019          

        

                                                    Vivian Cedillo                

    

 

                          Pulmonary hypertension, unspecified                   

      Active              

                                             Problem                         2020  

                                                    Koffi Barriga MD, PA        

    

        

 

                          CAD (coronary artery disease)                         

Active                    

                                             Problem                                 

                                                    Enmadison Cedillo                

    

 

                    Bilateral leg edema                         Active          

                    

                          Diagnosis                         2020          

      

                                                    Vivian Cedillo                

    

 

                    BMI 36.0-36.9,adult                         Active          

                    

                          Problem                         2020            

      

                                                    [facility]                

    

 

                          Cellulitis of leg without foot, left                  

       Active             

                                             Diagnosis                         

2020                                                   [facility]       

 

            

 

                          Hematoma of left lower extremity, initial encounter   

                      

Active                                                   Diagnosis              

 

                    2020                                                  

[facility]                    

 

                          Transient Ischemic Attack                         Acti

ve                        

                                                                      2014

                   

                                                    [facility]               

     

 

                          Diabetes Mellitus With Peripheral Circulatory Disorder

                         

Active                                                                          

 

                    2014                                                  

[facility]      

              

 

                    Hypertension                         Active                 

                    

                                             2014                         

       

                                        [facility]                    

 

                          Mixed Hyperlipoproteinemia                         Act

rickey                       

                                                                      2014

                  

                                                    [facility]               

     

 

                    Coronary Artery Disease                         Active      

                    

                                                    2014                  

   

                                                    [facility]               

     

 

                          Peripheral Vascular Disease                         Ac

tive                      

                                                                      2014

                 

                                                    [facility]               

     

 

                          Thyroid Function Tests Nonspecific Abnormal Findings  

                       

Active                                                                          

 

                    2014                                                  

[facility]      

              

 

                          Vaccines Prophylactic Need Against Influenza          

               Active     

                                                                      2014

                                                    [facility]               

  

   

 

                    Vitamin D Deficiency                         Active         

                    

                                                    2014                  

      

                                                    [facility]               

     

 

                    Acute Bronchitis                         Active             

                    

                                             2014                         

   

                                        [facility]                    



                                                                                
                                                                                
                                                                                
                                                                                
                                                                                
                                                                                
                                                                                
                                                                                
                                                                                
                                                                                
                                                                                
                                                                                
        



Medications

                    



                    Medication                         Details                  

       Route        

                          Status                         Patient Instructions   

         

                          Ordering Provider                         Order Date  

             

                                        Source                    

 

                    Cephalexin                         1 capsule                

         Orally     

                          Active                         500 MG Orally every 12 

hrs   

                          [facility]                         11/10/2020             

    

                                        Vivian Cedillo                    

 

                    Tamsulosin HCl                         1 capsule            

             Orally 

                          Active                         0.4 MG Orally Once a da

y 

                          [facility]                         2020             

  

                                        Vivian Cedillo                    

 

                    Isosorbide Mononitrate                         1 tablet     

                    

Orally                         Active                         10 MG Orally Twice

 a day                         [facility]                         2020        

                                        Koffi Barriga MD, PA                    

 

                          Azithromycin                         2 tablets  on the

 first day, then 1 tablet 

daily for 4 days                         Orally                         Active  

                          250 MG Orally Once a day                         [facility]  

                          2018                         Vivian Carm       

   

          

 

                    PredniSONE                         1 tablet                 

        Orally      

                          Active                         20 MG Orally Once a day

       

                          [facility]                         2018             

        

                                        Vivian Cedillo                    

 

                          Ciprodex                         4 drops into affected

 ear                      

                    Otic                         Active                         

0.3-0.1 % Otic 

Twice a day                         [facility]                         10/18/2018   

                                        Vivian Carm                    

 

                    Furosemide                         1 tablet                 

        Orally      

                          Active                         40 mg Orally Once a day

       

                          [facility]                         2018             

        

                                        Koffi Barriga MD, PA                    

 

                    Eliquis                         1 tablet                    

     Orally         

                          Active                         5 MG Orally twice a day

 (bid)    

                          [facility]                         2018             

     

                                        Koffi Barriga MD, PA                    

 

                    Aspirin                         1 tablet                    

     Orally         

                          Active                         81 MG Orally Once a day

          

                          [facility]                         2018             

           

                                        Koffi Barriga MD, PA                    

 

                    Tramadol HCl                         1 tablet as needed     

                    

Orally                         Active                         50 MG Orally every

 12 hrs as needed                         [facility]                         

2018                              Vivian Cedillo                    

 

                    Tamsulosin HCl                         1 capsule            

             Orally 

                          Active                         0.4 MG Orally once a da

y 

                          [facility]                         02/15/2017             

  

                                        Vivian Cedillo                    

 

                          Azithromycin 250 MG Oral Tablet                       

  ; Start Date: 

2013; End Date: 1900 (Active)                                       

                    Active                                                      

         

                          2013                         [facility]      

              

 

                          Dexamethasone 4 MG Oral Tablet                        

 ; Start Date: 2013;

 End Date: 2013 (Active)                                                  

 

Active                                                                          

 

                          2013                         [facility]      

              

 

                          Accu-Chek Kati In Vitro Strip                        

 ; Start Date: 2012 

(Active)                                                   Active               

 

                                                                      2012

           

                                        [facility]                    

 

                          Accu-Chek Multiclix Lancets Miscellaneous             

            ; Start Date: 

2012 (Active)                                                   Active    

 

                                                                      2012

                                        [facility]                    

 

                          Pen Needles 16" 31G X 8 MM Miscellaneous            

             ; Start Date:

 2012 (Active)                                                   Active   

 

                                                                       

2012                              [facility]                    

 

                          Januvia 100 MG Oral Tablet                         ; S

tart Date: 2012 

(Active)                                                   Active               

 

                                                                      2012

           

                                        [facility]                    

 

                          MetFORMIN HCl 1000 MG Oral Tablet                     

    ; Start Date: 

2012 (Active)                                                   Active    

 

                                                                      2012

                                        [facility]                    

 

                          Levemir FlexPen 100 UNIT/ML Subcutaneous Solution     

                    ; 

Start Date: 2012 (Active)                                                 

                    Active                                                      

                   

                          2012                         [facility]      

              

 

                          Cyanocobalamin 2500 MCG Sublingual Tablet Sublingual  

                       ; 

Start Date: 2012 (Active)                                                 

                    Active                                                      

                   

                          2012                         [facility]      

              

 

                          Clopidogrel Bisulfate 75 MG Oral Tablet               

          ; Start Date: 

2012 (Active)                                                   Active    

 

                                                                      2012

                                        [facility]                    

 

                          Hydrochlorothiazide 12.5 MG Oral Tablet               

          ; Start Date: 

2012 (Active)                                                   Active    

 

                                                                      2012

                                        [facility]                    

 

                          HumaLOG KwikPen 100 UNIT/ML Subcutaneous Solution     

                    ; 

Start Date: 2012 (Active)                                                 

                    Active                                                      

                   

                          2012                         [facility]      

              

 

                                        Metoprolol Succinate ER 50 MG Oral Table

t Extended Release 24 Hour              

                          ; Start Date: 2012 (Active)                     

               

                    Active                                                      

      

                          2012                         [facility]      

           

   

 

                          Ramipril 10 MG Oral Capsule                         ; 

Start Date: 2012 

(Active)                                                   Active               

 

                                                                      2012

           

                                        [facility]                    

 

                          Crestor 20 MG Oral Tablet                         ; St

art Date: 2012 

(Active)                                                   Active               

 

                                                                      2012

           

                                        [facility]                    

 

                          Vitamin D 2000 UNIT Oral Capsule                      

   ; Start Date: 

2012 (Active)                                                   Active    

 

                                                                      2012

                                        [facility]                    

 

                          AmLODIPine Besylate 2.5 MG Oral Tablet                

         ; Start Date: 

2012 (Active)                                                   Active    

 

                                                                      2012

                                        [facility]                    

 

                          Clopidogrel Bisulfate                         not defi

cyndi                       

                    NA                         Active                           

                  

                    Tasia Monk

et Nguyen       

             

 

                    Simvastatin                         not defined             

            NA      

                    Active                                                  

Tasia Cedillo           

 

        

 

                          Metoprolol Succinate ER                         not de

fined                     

                    NA                         Active                           

                

                    Tasia Monk

et Nguyen     

               

 

                    Tamsulosin HCl                         1 capsule            

             Orally 

                          Active                         0.4 MG Orally Once a da

y 

                        Tasia Cedillo                    

 

                    Ramipril                         not defined                

         NA         

                    Active                                                  [facility] Cedillo                

    

 

                    Metformin HCl                         not defined           

              NA    

                     Active                                                  

Tasia Cedillo           

 

        

 

                    Tamsulosin HCl                         1 capsule            

             Orally 

                          Active                         0.4 MG Orally Once a da

y 

                        Tasia Cedillo                    

 

                          Metoprolol Succinate ER                         not de

fined                     

                    NA                         Active                           

                

                    Tasia Monk

et Nguyen     

               

 

                          Metformin HCl                         1 tablet with me

als                       

                    Orally                         Active                       

  500 MG Orally 

Twice a day                         Tasia Cedillo                    

 

                    Simvastatin                         not defined             

            NA      

                    Active                                                  

Tasia Cedillo           

 

        

 

                    Losartan Potassium                         not defined      

                   

NA                         Active                                               

                    Tasia Monk

et Nguyen         

           

 

                    Aspirin                         1 tablet                    

     Orally         

                          Active                         81 MG Orally Once a day

          

                    Nithya Barriga MD, PA                    

 

                    Fish Oil                         1 capsule                  

       Orally       

                          Active                         500 MG Orally Twice a d

      

                    Nithya Barriga MD, PA                    

 

                          Metformin HCl                         1 tablet with me

als                       

                    Orally                         Active                       

  500 MG Orally 

Twice a day                         Nithya Barriga MD, PA                    

 

                          Metoprolol Succinate ER                         1 tabl

et                        

                    Orally                         Active                       

  50 MG Orally Once

 a day                         Nithya Barriga MD, PA                    

 

                    Humalog                         not defined                 

        Subcutaneous

                          Active                         100 UNIT/ML Subcutaneou

s

                          Nithya Barriga MD, PA                    

 

                    Tradjenta                         1 tablet                  

       Orally       

                          Active                         Orally Once a day      

        

                    Nithya Barriga MD, PA                    

 

                    Levemir                         not defined                 

        Subcutaneous

                          Active                         100 UNIT/ML Subcutaneou

s

                          Nithya Barriga MD, PA                    

 

                    Losartan Potassium                         1 tablet         

                

Orally                         Active                         50 MG Orally Once 

a day                         Nithya Barriga MD, PA                    

 

                    Ramipril                         1 capsule                  

       Orally       

                          Active                         10 MG Orally Once a day

        

                    Nithya Barriga MD, PA                    

 

                    Clopidogrel Bisulfate                         1 tablet      

                   

Orally                         Active                         75 MG Orally Once 

a day                         Koffi Boyle MD, PA       

             

 

                          Metoprolol Succinate ER                         1 tabl

et                        

                    Orally                         Active                       

  100 MG Orally 

Once a day                         Nithya Barriga MD, PA                    

 

                    Losartan Potassium                         1 tablet         

                

Orally                         Active                         50 MG Orally Once 

a day                         Tasia Cedillo                    

 

                    Furosemide                         1 tablet                 

        Orally      

                          Active                         40 MG Orally Once a day

       

                    Tasia Dasilva                    

 

                    Novolin N                         20-25 units               

          

Subcutaneous                         Active                         100 UNIT/ML 

Subcutaneous at night                         Tasia Cedillo                    

 

                    Novolin R                         36 units                  

       Injection    

                          Active                         100 UNIT/ML Injection T

ID   

                      Tasia Cedillo                    

 

                    Eliquis                         1 tablet                    

     Orally         

                          Active                         5 MG Orally BID        

          

                    Tasia Monk

jun 

Koffi Cedillo MD, PA                    

 

                          Simvastatin                         1 tablet in the ev

ening                     

                    Orally                         Active                       

  10 MG Orally 

Once a day                         Koffi Boyle MD, PA       

             

 

                    Ramipril                         1 capsule                  

       Orally       

                          Active                         10 MG Orally Once a day

        

                    Nithya Barriga MD, PA                    

 

                          Metoprolol Succinate ER                         1 tabl

et                        

                    Orally                         Active                       

  50 MG Orally Once

 a day                         Nithya Barriga MD, PA                    

 

                    Levemir                         Unknown                     

    Subcutaneous    

                          Active                         100 UNIT/ML Subcutaneou

s    

                     Nithya Barriga MD, PA                    

 

                          Metformin HCl                         1 tablet with me

als                       

                    Orally                         Active                       

  500 MG Orally 

Twice a day                         Nithya Barriga MD, PA                    

 

                    Clopidogrel Bisulfate                         1 tablet      

                   

Orally                         Active                         75 MG Orally Once 

a day                         Nithya Barriga MD, PA                    

 

                    Januvia                         2 tablets                   

      Orally        

                          Active                         50 MG Orally Once a day

         

                    Nithya Barriga MD, PA                    

 

                    Humalog                         Unknown                     

    Subcutaneous    

                          Active                         [facility]

s    

                     Nithya Barriga MD, PA                    

 

                    Fish Oil                         1 capsule                  

       Orally       

                          Active                         500 MG Orally Twice a d

ay      

                    Nithya Barriga MD, PA                    

 

                          Simvastatin                         1 tablet in the ev

ening                     

                    Orally                         Active                       

  10 mg Orally 

Once a day                         Nithya Barriga MD, PA                    

 

                    Tradjenta                         1 tablet                  

       Orally       

                          Active                         Orally Once a day      

        

                    Nithya Barriga MD, PA                    

 

                    Aspirin                         1 tablet                    

     Orally         

                          Active                         81 MG Orally Once a day

          

                    Nithya Barriga MD, PA                    

 

                          Metoprolol Succinate ER                         1 tabl

et                        

                    Orally                         Active                       

  100 mg Orally 

Once a day                         Nithya Barriga MD, PA                    

 

                          Furosemide                         TAKE 1 TABLET BY [facility] ONCE DAILY FOR 30 DAYS

                          Orally                         Active                 

 

                          40MG Orally Once a day                         Nithya Barriga MD, PA        

            

 

                          Simvastatin                         1 tablet in the ev

ening                     

                    Orally                         Active                       

  10 mg Orally 

Once a day                         Nithya Barriga MD, PA                    

 

                          Simvastatin                         1 tablet in the ev

ening                     

                    Orally                         Active                       

  10 MG Orally 

Once a day                         Tasia                                       

                                        [facility]                    

 

                    Tamsulosin HCl                         1 capsule            

             Orally 

                          Active                         0.4 MG Orally Once a da

y 

                        Tasia Greerparisajun Yohikingsley                    

 

                    Furosemide                         1 tablet                 

        Orally      

                          Active                         40MG Orally Once a day 

       

                    Nithya Monk

et 

Koffi Cedillo MD, PA                    

 

                          Metoprolol Succinate ER                         1 tabl

et                        

                    Orally                         Active                       

  100MG Orally Once

 a day                         Nithya Barriga MD, PA                    



                                                                                
                                                                                
                                                                                
                                                                                
                                                                                
                                                                                
                                                                                
                                                                                
                                                                                
                                                                                
                                                                                
                                                                                
                                                                                
                                                                                
                                                                                
                                                    



Allergies, Adverse Reactions, Alerts

                    



                    Substance                         Category                  

       Reaction     

                          Severity                         Reaction type        

      

                    Status                         Date Reported                

         

Comments                                Source                    

 

                    N.K.D.A.                         Adverse Reaction           

              Info 

Not Available                                                   Adverse Reaction

 

                                                    2020                  

   

                                                    Enayet Rahim                

    

 

                          No Known Drug Allergies                         drug a

llergy                    

                                                                      drug aller

gy             

                    Active                                                      

        

                                        [facility]                    



                                                                



Immunizations

                    



                    Immunization                         Date Given             

            Site    

                          Status                         Last Updated           

     

                          Comments                         Source               

     

 

                           -  FLUZONE IIV4                                              

                                             completed                          

                

                                                    Enayet Rahim                

    

 

                          2019 Single Syringe Fluecelvax Quad                   

      2019          

                                             completed                          

     

                                                    Enayet Rahim                

    

 

                          Fluzone Intramuscular Injectable                      

   2013             

                                             completed                          

        

                                                    [facility]               

     

 

                    Influenza                                                   

                    

                    completed                                                   

                

                                        [facility]                    



                                                                                
             



Results





                                        No Data Provided for This Section



                    



Pathology Reports





                                        No Data Provided for This Section       

             



                            



Diagnostic Reports

            



                                        No Data Provided for This Section       

             



                                                            



Consultation Notes

                    



                                        No Data Provided for This Section       

             



                                                            



Discharge Summaries

                    



                                        No Data Provided for This Section       

             



                                                            



History and Physicals

                    



                                        No Data Provided for This Section       

             



                                                                



Vital Signs

                     



                    Vital Sign                         Value                    

     Date           

                          Comments                         Source               

     

 

                    Weight                         252                          

2020          

                                                    Enayet Rahim                

    

 

                    Height                         70                          1

2020           

                                                    Enayet Rahim                

    

 

                    Temperature Oral (F)                         97.4 F         

                

2020                                                   Enayet Rahim       

 

            

 

                    Diastolic (mm Hg)                         72                

          2020

                                                    Enayet Rahim                

  

  

 

                    Systolic (mm Hg)                         128                

          2020

                                                    Enayet Rahim                

  

  

 

                    Weight                         254                          

11/10/2020          

                                                    Enayet Rahim                

    

 

                    Height                         70                          1

1/10/2020           

                                                    Enayet Rahim                

    

 

                    Temperature Oral (F)                         98 F           

              

11/10/2020                                                   Enayet Rahim       

 

            

 

                    Diastolic (mm Hg)                         73                

          11/10/2020

                                                    Enayet Rahim                

  

  

 

                    Systolic (mm Hg)                         126                

          11/10/2020

                                                    Enayet Rahim                

  

  

 

                    Weight                         257                          

10/27/2020          

                                                    Enayet Rahim                

    

 

                    Height                         70                          1

           

                                                    Enayet Rahim                

    

 

                    Temperature Oral (F)                         97.5 F         

                

10/27/2020                                                   Enayet Rahim       

 

            

 

                    Diastolic (mm Hg)                         84                

          10/27/2020

                                                    Enayet Rahim                

  

  

 

                    Systolic (mm Hg)                         148                

          10/27/2020

                                                    Enayet Rahim                

  

  

 

                    Weight                         250                          

2020          

                                                    Koffi Barriga MD, PA        

            

 

                    Heart Rate                         70                       

   2020       

                                                    Koffi Barriga MD, PA        

         

   

 

                    Diastolic (mm Hg)                         83                

          2020

                                                    Koffi Barriga MD, PA        

  

          

 

                    Systolic (mm Hg)                         119                

          2020

                                                    Koffi Barriga MD, PA        

  

          

 

                    Weight                         245                          

2020          

                                                    Enayet Rahim                

    

 

                    Height                         70                          0

2020           

                                                    Enayet Rahim                

    

 

                    Temperature Oral (F)                         97.3 F         

                

2020                                                   Enayet Rahim       

 

            

 

                    Diastolic (mm Hg)                         90                

          2020

                                                    Enayet Rahim                

  

  

 

                    Systolic (mm Hg)                         147                

          2020

                                                    Enayet Rahim                

  

  

 

                    Weight                         255                          

2020          

                                                    Koffi Barriga MD, PA        

            

 

                    Heart Rate                         75                       

   2020       

                                                    Koffi Barriga MD, PA        

         

   

 

                    Diastolic (mm Hg)                         65                

          2020

                                                    Koffi Barriga MD, PA        

  

          

 

                    Systolic (mm Hg)                         110                

          2020

                                                    Koffi Barriga MD, PA        

  

          

 

                    Weight                         247                          

2020          

                                                    Enayet Rahim                

    

 

                    Height                         70                          0

3/16/2020           

                                                    Enayet Rahim                

    

 

                    Temperature Oral (F)                         97.5 F         

                

2020                                                   Enayet Rahim       

 

            

 

                    Diastolic (mm Hg)                         69                

          2020

                                                    Enayet Rahim                

  

  

 

                    Systolic (mm Hg)                         124                

          2020

                                                    Enayet Rahim                

  

  

 

                    Weight                         255                          

2020          

                                                    Koffi Barriga MD, PA        

            

 

                    Heart Rate                         79                       

   2020       

                                                    Koffi Barriga MD, PA        

         

   

 

                    Diastolic (mm Hg)                         62                

          2020

                                                    Koffi Barriga MD, PA        

  

          

 

                    Systolic (mm Hg)                         110                

          2020

                                                    Koffi Barriga MD, PA        

  

          

 

                    Weight                         260                          

2020          

                                                    Koffi Barriga MD, PA        

            

 

                    Heart Rate                         76                       

   2020       

                                                    Koffi Barriga MD, PA        

         

   

 

                    Diastolic (mm Hg)                         67                

          2020

                                                    Koffi Barriga MD, PA        

  

          

 

                    Systolic (mm Hg)                         118                

          2020

                                                    Koffi Barriga MD, PA        

  

          

 

                    Weight                         248                          

2019          

                                                    Enayet Rahim                

    

 

                    Height                         70                          1

2019           

                                                    Enayet Rahim                

    

 

                    Temperature Oral (F)                         97.5 F         

                

2019                                                   Enayet Rahim       

 

            

 

                    Diastolic (mm Hg)                         58                

          2019

                                                    Enayet Rahim                

  

  

 

                    Systolic (mm Hg)                         92                 

         2019 

                                                    Enayet Rahim                

   

 

 

                    Weight                         255                          

10/02/2019          

                                                    Koffi Barriga MD, PA        

            

 

                    Heart Rate                         59                       

   10/02/2019       

                                                    Koffi Barriga MD, PA        

         

   

 

                    Diastolic (mm Hg)                         67                

          10/02/2019

                                                    Koffi Barriga MD, PA        

  

          

 

                    Systolic (mm Hg)                         125                

          10/02/2019

                                                    Koffi Barriga MD, PA        

  

          

 

                    Weight                         248                          

2019          

                                                    Enayet Rahim                

    

 

                    Height                         70                          0

2019           

                                                    Enayet Rahim                

    

 

                    Temperature Oral (F)                         98.2 F         

                

2019                                                   Enayet Rahim       

 

            

 

                    Diastolic (mm Hg)                         87                

          2019

                                                    Enayet Rahim                

  

  

 

                    Systolic (mm Hg)                         155                

          2019

                                                    Enayet Rahim                

  

  

 

                    Weight                         247                          

2019          

                                                    Enayet Rahim                

    

 

                    Height                         70                          0

2019           

                                                    Enayet Rahim                

    

 

                    Temperature Oral (F)                         97.0 F         

                

2019                                                   Enayet Rahim       

 

            

 

                    Diastolic (mm Hg)                         78                

          2019

                                                    Enayet Rahim                

  

  

 

                    Systolic (mm Hg)                         126                

          2019

                                                    Enayet Rahim                

  

  

 

                    Weight                         255                          

2019          

                                                    Koffi Barriga MD, PA        

            

 

                    Heart Rate                         66                       

   2019       

                                                    oKffi Barriga MD, PA        

         

   

 

                    Diastolic (mm Hg)                         60                

          2019

                                                    Koffi Barriga MD, PA        

  

          

 

                    Systolic (mm Hg)                         129                

          2019

                                                    Koffi Barriga MD, PA        

  

          

 

                    Weight                         255                          

2018          

                                                    Enayet Rahim                

    

 

                    Height                         70                          1

2018           

                                                    Enayet Rahim                

    

 

                    Temperature Oral (F)                         102.1 F        

                 

2018                                                   Enayet Rahim       

 

            

 

                    Diastolic (mm Hg)                         74                

          2018

                                                    Enayet Rahim                

  

  

 

                    Systolic (mm Hg)                         111                

          2018

                                                    Enayet Rahim                

  

  

 

                    Weight                         258                          

2018          

                                                    Enayet Rahim                

    

 

                    Height                         70                          1

2018           

                                                    Enayet Rahim                

    

 

                    Temperature Oral (F)                         98.6 F         

                

2018                                                   Enayet Rahim       

 

            

 

                    Diastolic (mm Hg)                         67                

          2018

                                                    Enayet Rahim                

  

  

 

                    Systolic (mm Hg)                         107                

          2018

                                                    Enayet Rahim                

  

  

 

                    Weight                         258                          

10/23/2018          

                                                    Koffi Barriga MD, PA        

            

 

                    Heart Rate                         63                       

   10/23/2018       

                                                    Koffi Barriga MD, PA        

         

   

 

                    Diastolic (mm Hg)                         80                

          10/23/2018

                                                    Koffi Barriga MD, PA        

  

          

 

                    Systolic (mm Hg)                         141                

          10/23/2018

                                                    Koffi Barriga MD, PA        

  

          

 

                    Weight                         258                          

10/18/2018          

                                                    Enayet Rahim                

    

 

                    Height                         70                          1

           

                                                    Enayet Rahim                

    

 

                    Temperature Oral (F)                         98.7 F         

                

10/18/2018                                                   Enayet Rahim       

 

            

 

                    Diastolic (mm Hg)                         66                

          10/18/2018

                                                    Enayet Rahim                

  

  

 

                    Systolic (mm Hg)                         116                

          10/18/2018

                                                    Enayet Rahim                

  

  

 

                    Weight                         260                          

2018          

                                                    Koffi Barriga MD, PA        

            

 

                    Heart Rate                         84                       

   2018       

                                                    Koffi Barriga MD, PA        

         

   

 

                    Diastolic (mm Hg)                         70                

          2018

                                                    Koffi Barriga MD, PA        

  

          

 

                    Systolic (mm Hg)                         110                

          2018

                                                    Koffi Barriga MD, PA        

  

          

 

                    Weight                         270                          

2018          

                                                    Koffi Barriga MD, PA        

            

 

                    Heart Rate                         92                       

   2018       

                                                    Koffi Barriga MD, PA        

         

   

 

                    Diastolic (mm Hg)                         80                

          2018

                                                    Koffi Barriga MD, PA        

  

          

 

                    Systolic (mm Hg)                         140                

          2018

                                                    Koffi Barriga MD, PA        

  

          

 

                    Weight                         263                          

2018          

                                                    Enayet Rahim                

    

 

                    Height                         70                          0

2018           

                                                    Enayet Rahim                

    

 

                    Temperature Oral (F)                         98.4 F         

                

2018                                                   Enayet Rahim       

 

            

 

                    Diastolic (mm Hg)                         89                

          2018

                                                    Enayet Rahim                

  

  

 

                    Systolic (mm Hg)                         150                

          2018

                                                    Enayet Rahim                

  

  

 

                    Weight                         265                          

2018          

                                                    Koffi Barriga MD, PA        

            

 

                    Heart Rate                         77                       

   2018       

                                                    Koffi Barriga MD, PA        

         

   

 

                    Diastolic (mm Hg)                         88                

          2018

                                                    Koffi Barriga MD, PA        

  

          

 

                    Systolic (mm Hg)                         140                

          2018

                                                    Koffi Barriga MD, PA        

  

          

 

                    Weight                         266                          

2017          

                                                    Enayet Rahim                

    

 

                    Height                         70                          1

2017           

                                                    Enayet Rahim                

    

 

                    Temperature Oral (F)                         97.5 F         

                

2017                                                   Enayet Rahim       

 

            

 

                    Diastolic (mm Hg)                         91                

          2017

                                                    Enayet Rahim                

  

  

 

                    Systolic (mm Hg)                         164                

          2017

                                                    Enayet Rahim                

  

  

 

                    Weight                         265                          

10/05/2017          

                                                    Koffi Barriga MD, PA        

            

 

                    Heart Rate                         78                       

   10/05/2017       

                                                    Koffi Barriga MD, PA        

         

   

 

                    Diastolic (mm Hg)                         65                

          10/05/2017

                                                    Koffi Barriga MD, PA        

  

          

 

                    Systolic (mm Hg)                         138                

          10/05/2017

                                                    Koffi Barriga MD, PA        

  

          

 

                    Weight                         261                          

2017          

                                                    Enayet Rahim                

    

 

                    Height                         70                          0

2017           

                                                    Enayet Rahim                

    

 

                    Temperature Oral (F)                         98.3 F         

                

2017                                                   Enayet Rahim       

 

            

 

                    Diastolic (mm Hg)                         67                

          2017

                                                    Enayet Rahim                

  

  

 

                    Systolic (mm Hg)                         109                

          2017

                                                    Enayet Rahim                

  

  

 

                    Weight                         265                          

2017          

                                                    Koffi Barriga MD, PA        

            

 

                    Heart Rate                         76                       

   2017       

                                                    Koffi Barriga MD, PA        

         

   

 

                    Diastolic (mm Hg)                         85                

          2017

                                                    Koffi Barriga MD, PA        

  

          

 

                    Systolic (mm Hg)                         133                

          2017

                                                    Koffi Barriga MD, PA        

  

          

 

                    Weight                         264                          

2016          

                                                    Enayet Rahim                

    

 

                    Height                         70                          0

2016           

                                                    Enayet Rahim                

    

 

                    Temperature Oral (F)                         99.1 F         

                

2016                                                   Enayet Rahim       

 

            

 

                    Diastolic (mm Hg)                         55                

          2016

                                                    Enayet Rahim                

  

  

 

                    Systolic (mm Hg)                         140                

          2016

                                                    Enayet Rahim                

  

  

 

                    Weight                         270                          

2016          

                                                    Koffi Barriga MD, PA        

            

 

                    Heart Rate                         85                       

   2016       

                                                    Koffi Barriga MD, PA        

         

   

 

                    Diastolic (mm Hg)                         85                

          2016

                                                    Koffi Barriga MD, PA        

  

          

 

                    Systolic (mm Hg)                         115                

          2016

                                                    Koffi Barriga MD, PA        

  

          

 

                    Weight                         270                          

2016          

                                                    Koffi Barriga MD, PA        

            

 

                    Heart Rate                         78                       

   2016       

                                                    Koffi Barriga MD, PA        

         

   

 

                    Diastolic (mm Hg)                         70                

          2016

                                                    Koffi Barriga MD, PA        

  

          

 

                    Systolic (mm Hg)                         125                

          2016

                                                    Koffi Barriga MD, PA        

  

          



                                                                                
                                                                                
                                                                                
                                                                                
                                                                                
                                                                                
                                                                                
                                                                                
                                                                                
                                                                                
                                                                                
                                                                                
                                                                                
                                                                                
                                                                                
                                                                                
                                                                                
                                                                                
                                                                                
                                                                                
                                                                                
                                                                                
                                                                                
                                                                                
                                                                                
                                                                                
                                                                                
        



Encounters

                    



                    Location                         Location Details           

              

Encounter Type                         Encounter Number                         

Reason For Visit                         Attending Provider                     

                    ADM Date                         DC Date                    

     Status     

                                        Source                    

 

                                                                  AUDIT         

                

[id number]                                                                        

 

                    2013               

               

                                        [facility]                    

 

                                                                  AUDIT         

                

[id number]                                                                        

 

                    2013               

               

                                        [facility]                    

 

                                                                      E30, Provi

jackie: FARHAT,DONAVAN, 

Status: Pen, Time: 10:15 AM                         [id number]                    

                                                                        20

13             

                    2013                                                  

[facility]                    

 

                                                                  AUDIT         

                

[id number]                                                                        

 

                    2013               

               

                                        [facility]                    

 

                                                                      E30, Provi

jackie: FARHAT,DONAVAN, 

Status: Pen, Time: 9:45 AM                         [id number]                     

                                                                        20

13              

                    2013                                                  

[facility]                    

 

                                                                  AUDIT         

                

[id number]                                                                        

 

                    2013               

               

                                        [facility]                    

 

                                                                  AUDIT         

                

[id number]                                                                        

 

                    2013               

               

                                        [facility]                    

 

                                                                      E30, Provi

jackie: FARHAT,DONAVAN, 

Status: Pen, Time: 1:00 PM                         [id number]                     

                                                                        20

13              

                    2013                                                  

[facility]                    

 

                                                                  AUDIT         

                

[id number]                                                                        

 

                    2013               

               

                                        [facility]                    

 

                                                                  AUDIT         

                

[id number]                                                                        

 

                    2014               

               

                                        [facility]                    

 

                                                                  AUDIT         

                

[id number]                                                                        

 

                    2014               

               

                                        [facility]                    

 

                                                                      E30, Provi

jackie: FARHAT,DONAVAN, 

Status: Pen, Time: 10:15 AM                         [id number]                    

                                                                        20

14             

                    2014                                                  

[facility]                    

 

                                                                      WME, Provi

jackie: TORIE MARINO, 

Status: Pen, Time: 10:15 AM                         [id number]                    

                                                                        20

14             

                    2014                                                  

[facility]                    

 

                                                                      E30, Provi

jackie: FARHAT,DONAVAN, 

Status: Pen, Time: 1:00 PM                         [id number]                     

                                                                        20

14              

                    2014                                                  

[facility]                    

 

                    [facility]                                     

             TROUBLE

 URINATING                              [id number]    

     

                                                                        20

15  

                          2015                                            

   

                                        Koffi Barriga MD, PA                    

 

                    [facility]                                     

             TROUBLE

 URINATING                              [id number]    

     

                                                                        20

15  

                          2015                                            

   

                                        Koffi Barriga MD, PA                    

 

                    [facility]                                     

             TROUBLE

 URINATING                              [id number]    

     

                                                                        20

15  

                          2015                                            

   

                                        Vivian Cedillo                    

 

                    [facility]                                     

             TROUBLE

 URINATING                              [id number]    

     

                                                                        20

15  

                          2015                                            

   

                                        Koffi Barriga MD, PA                    

 

                    [facility]                                     

             TROUBLE

 URINATING                              [id number]    

     

                                                                        20

15  

                          2015                                            

   

                                        Koffi Barriga MD, PA                    

 

                    [facility]                                     

             Unknown

                                        [id number]    

               

                                                                        20

15            

                    2015                                                  

Koffi Barriga MD, PA                    

 

                    [facility]                                     

             Unknown

                                        [id number]    

               

                                                                        20

15            

                    2015                                                  

Koffi Barriga MD, PA                    

 

                    [facility]                                     

             Unknown

                                        [id number]    

               

                                                                        20

15            

                    2015                                                  

[facility]                                     

             [facility]

                                        [id number]    

               

                                                                        20

15            

                    2015                                                  

Koffi Barriga MD, PA                    

 

                    [facility]                                     

             Unknown

                                        [id number]    

               

                                                                        20

15            

                    2015                                                  

Koffi Barriga MD, PA                    

 

                    Koffi Barriga MD, PA                                        

          Follow-Up 

                                        [id number]    

                

                                                                        20

16             

                    2016                                                  

Koffi Barriga MD, PA                    

 

                    Koffi Barriga MD, PA                                        

          Follow-Up 

                                        [id number]    

                

                                                                        20

16             

                    2016                                                  

Koffi Barriga MD, PA                    

 

                    Koffi Barriga MD, PA                                        

          Follow-Up 

                                        [id number]    

                

                                                                        20

16             

                    2016                                                  

Vivian Barriga MD, PA                                        

          Follow-Up 

                                        [id number]    

                

                                                                        20

16             

                    2016                                                  

Koffi Barriga MD, PA                    

 

                    Koffi Barriga MD, PA                                        

          Follow-Up 

                                        [id number]    

                

                                                                        20

16             

                    2016                                                  

Koffi Barriga MD, PA                    

 

                    Koffi Barriga MD, PA                                        

          Follow-Up 

                                        [id number]    

                

                                                                        20

16             

                    2016                                                  

Koffi Barriga MD, PA                    

 

                    Koffi Barriga MD, PA                                        

          

echo/carotid/arterial dopplers                         

[id number]                                            

                                               2016  

                                                    Koffi Barriga MD, PA        

    

        

 

                    Koffi Barriga MD, PA                                        

          

echo/carotid/arterial dopplers                         

[id number]                                            

                                               2016  

                                                    Koffi Barriga MD, PA        

    

        

 

                    Koffi Barriga MD, PA                                        

          Follow-Up 

                                        [id number]    

                

                                                                        20

16             

                    2016                                                  

Vivian Barriga MD, PA                                        

          Follow-Up 

                                        [id number]    

                

                                                                        20

16             

                    2016                                                  

Koffi Barriga MD, PA                    

 

                    Koffi Barriga MD, PA                                        

          

echo/carotid/arterial dopplers                         

[id number]                                            

                                               2016  

                                                    Vivian Barriga MD, PA                                        

          

echo/carotid/arterial dopplers                         

[id number]                                            

                                               2016  

                                                    Koffi Barriga MD, PA        

    

        

 

                    Vivian Cedillo MD, PA                                        

          NEW PT    

                                        [id number]    

                   

                                                                        20

16                

                    2016                                                  

Vivian Barriga MD, PA                                        

          Follow-Up 

                                        [id number]    

                

                                                                        20

17             

                    2017                                                  

Vivian Barriga MD, PA                                        

          Follow-Up 

                                        [id number]    

                

                                                                        20

17             

                    2017                                                  

Koffi Barriga MD, PA                    



                                                                                
                                                                                
                                                                                
                                                                                
                                                                                
                                                                                
                                                



Procedures

        



                                        No Data Provided for This Section



                                                    



Assessment and Plan

                    



                                        No Data Provided for This Section       

             



                                    



Plan of Care





                    Plan of Care        Date                Source

 

                                        [QLH] TSH, 3RD GENERATION 2013 [facility]

teri[QLH] T4, FREE 2013 

Routine[QLH] CMP W/EGFR 2013 Routine[QL] VITAMIN D, 25-HYDROXY, LC/MS/MS 
2013 Routine                         2013                         [facility]                    



                                                                        



Social History

                    



                    Social History                         Date                 

        Source      

              

 

                                        Social History ElementQualifiersDate Rep

orted

Tobacco Use:

                                        .  Are you a: never smoker

2017

Marital Status:

                                        .  

2017

Do you drink alcohol?

                                        .  Status: No

2017                         Koffi Barriga MD, P

A

                    

 

                                        Social History ElementQualifiersDate Rep

orted

Tobacco Use:

                                        .  Are you a: Never Smoker

Aug 19, 2016

Marital Status:

                                        .  

Aug 19, 2016

Caffeine intake?

                                        .  Status: Yes, What type: Tea

Aug 19, 2016

Do you exercise?

                                        .  Answer: No

Aug 19, 2016

Do you drink alcohol?

                                        .  Do you drink alcohol? No

Aug 19, 2016

Travel outside [place]:

                                        .  NO

Aug 19, 2016

Occupation:

                                        .  retired

Aug 19, 2016

                          2016                         [facility] Cedillo       

 

            

 

                                        Never A Smoker        (Active)     Never

 Drank Alcohol        (Active)     

Marital History -  (V61.03);         (Active)                           
                          2014                         [facility]      

              



                                                                                
                        



Family History

                    



                    Value                         Date                         S

ource               

     

 

                                        QualifierDescriptionCommentDate Reported

Maternal Grandmother

Comment not available

Aug 19, 2016

Paternal Grandmother

Comment not available

Aug 19, 2016

Children

Comment not available

Aug 19, 2016

Maternal Grandfather

Comment not available

Aug 19, 2016

Father



Comment not available

Aug 19, 2016

Brother(s)

Comment not available

Aug 19, 2016

Mother



Comment not available

Aug 19, 2016

Paternal Grandfather

Comment not available

Aug 19, 2016

Sister(s)

alive

Comment not available

Aug 19, 2016

Other:

Comment not available

Aug 19, 2016

General Family History

Comment not available

Aug 19, 2016

                          2017                         [facility] Cedillo       

 

            

 

                                        Family history of Coronary Artery Diseas

e (V17.49);         (Active)     Family 

history of Cerebral Artery Occlusion        (Active)     Family history of 
Diabetes Mellitus (V18.0);         (Active)     Family history of Hypertension 
(V17.49);         (Active)     Family history of Hyperlipidemia        (Active) 
    No Family history of Pancreatitis        (Denied)     No Family history of 
Thyroid Cancer        (Denied)     No Family history of Bladder Cancer        
(Denied)                              2014                         [facility]                    

 

                                        Family history of Coronary Artery Diseas

e (V17.49);         (Active)     Family 

history of Cerebral Artery Occlusion        (Active)     Family history of 
Diabetes Mellitus (V18.0);         (Active)     Family history of Hypertension 
(V17.49);         (Active)     Family history of Hyperlipidemia        (Active) 
    No Family history of Pancreatitis        (Denied)     No Family history of 
Thyroid Cancer        (Denied)                              2014          

                                        [facility]                    

 

                                        Family history of Coronary Artery Diseas

e (V17.49);         (Active)     Family 

history of Cerebral Artery Occlusion        (Active)     Family history of 
Diabetes Mellitus (V18.0);         (Active)     Family history of Hypertension 
(V17.49);         (Active)     Family history of Hyperlipidemia        (Active) 
    No Family history of Pancreatitis        (Denied)     No Family history of 
Thyroid Cancer        (Denied)                              2013          

                                        [facility]                    

 

                                        Family history of Coronary Artery Diseas

e (V17.49);         (Active)     Family 

history of Cerebral Artery Occlusion        (Active)     Family history of 
Diabetes Mellitus (V18.0);         (Active)     Family history of Hypertension 
(V17.49);         (Active)     Family history of Hyperlipidemia        (Active) 
    No Family history of Pancreatitis        (Denied)                           
                          2013                         [facility]      

              

 

                                        Family history of Coronary Artery Diseas

e (V17.49);         (Active)     Family 

history of Cerebral Artery Occlusion        (Active)     Family history of 
Diabetes Mellitus (V18.0);         (Active)     Family history of Hypertension 
(V17.49);         (Active)     Family history of Hyperlipidemia        (Active) 
    No Family history of Pancreatitis        (Denied)                           
                          2013                         [facility]      

              

 

                                        Family history of Coronary Artery Diseas

e (V17.49);         (Active)     Family 

history of Cerebral Artery Occlusion        (Active)     Family history of 
Diabetes Mellitus (V18.0);         (Active)     Family history of Hypertension 
(V17.49);         (Active)     Family history of Hyperlipidemia        (Active) 
    No Family history of Pancreatitis        (Denied)                           
                          2013                         [facility]      

              

 

                                        Family history of Coronary Artery Diseas

e (V17.49);         (Active)     Family 

history of Cerebral Artery Occlusion        (Active)     Family history of 
Diabetes Mellitus (V18.0);         (Active)     Family history of Hypertension 
(V17.49);         (Active)     Family history of Hyperlipidemia        (Active) 
    No Family history of Pancreatitis        (Denied)                           
                          2013                         [facility]      

              

 

                                        Family history of Cerebral Artery Occlus

ion        (Active)     Family history 

of Coronary Artery Disease (V17.49);         (Active)     Family history of 
Diabetes Mellitus (V18.0);         (Active)     Family history of Hypertension 
(V17.49);         (Active)     Family history of Hyperlipidemia        (Active) 
                             2013                         [facility]   

                 



                                                                                
                                                                                
                                        



Advance Directives

                    



                    Order Name                         Results                  

       Value        

                          Date                         Source                   

 

 

                          Advance Directives                         Advance Dir

ectives                   

                          No Advance Directives available.                      

   2014       

                                        [facility]                    

 

                          Advance Directives                         [facility] Dir

ectives                   

                          No Advance Directives available.                      

   2014       

                                        [facility]                    

 

                          Advance Directives                         [facility] Dir

ectives                   

                          No Advance Directives available.                      

   2013       

                                        [facility]                    

 

                          Advance Directives                         Advance Dir

ectives                   

                          No Advance Directives available.                      

   2013       

                                        [facility]                    

 

                          Advance Directives                         Advance Dir

ectives                   

                          No Advance Directives available.                      

   2013       

                                        [facility]                    

 

                          Advance Directives                         [facility] Dir

ectives                   

                          No Advance Directives available.                      

   2013       

                                        [facility]                    

 

                          Advance Directives                         [facility] Dir

ectives                   

                          No Advance Directives available.                      

   2013       

                                        [facility]                    

 

                          Advance Directives                         [facility] Dir

ectives                   

                          No Advance Directives available.                      

   2013       

                                        [facility]                    



                                                                                
                                                                                
                    



Functional Status

                    



                                        No Data Provided for This Section

## 2020-11-28 NOTE — XMS REPORT
Continuity of Care Document

                             Created on: 2020



ANDREW DONALDSON JR

External Reference #: [id number]

: 1945

Sex: Male



Demographics





                          Address                   [address] [place]  [address]

 

                          Home Phone                [phone]

 

                          Preferred Language        English

 

                          Marital Status            Unknown

 

                          Protestant Affiliation     Unknown

 

                          Race                      Unknown

 

                                        Additional Race(s) 

 

 

                          Ethnic Group              Non-





Author





                          Author                    [facility]

t

 

                          Organization              [facility]

 

                          Address                   [address] [place] [address] [address]

[place]  [address]



 

                          Phone                     Unavailable







Support





                Name            Relationship    Address         Phone

 

                NILSON BREWER    ECON            Unknown         [phone]







Care Team Providers





                    Care Team Member Name Role                Phone

 

                    SONJA BURRIS M.D. PCP                 [phone]

 

                    ALICE SUERO      Attphys             Unavailable

 

                    IRENE,  EVETTE      Attphys             Unavailable

 

                    AMINATA,  REYES   Attphys             Unavailable

 

                    IRENE,  EVETTE      Admphys             Unavailable

 

                    AMINATA,  REYES   Admphys             Unavailable







Payers





           Payer Name Policy Type Policy Number Effective Date Expiration Date ANTONELLA hamilton

 

           Miscellaneous o            [id number] 2016 00:00:00          

  [facility]

 

           Medicare A & B            [id number] 2010 00:00:00            [facility]







Problems





           Condition Name Condition Details Condition Category Status     Onset 

Date Resolution

Date            Last Treatment Date Treating Clinician Comments        Source

 

        Congestive heart failure CHF (congestive heart failure) Problem Active  

                                 

                                        [facility]

 

       Hypotension Hypotension Problem Active                                   

 [facility]

 

       Acute respiratory failure Acute respiratory failure Problem Active       

                             [facility]

 

       Dyspnea Dyspnea Problem Active                                    [facility]

 

       Sepsis Sepsis Problem Active                                    [facility]

[facility]

 

                          Diabetes                                          Diab

etes                        Active         

                                      Problem                        2020 
                                              Enayet Rahim                     

Problem   Active                        2020 05:11:04                     

[facility]

 

                          BPH (benign prostatic hyperplasia)                    

     BPH (benign prostatic

hyperplasia)                        Active                                      
         Problem                        2020                              
                 Enayet Rahim                     Problem      Active           

                      2020 

05:11:04                                                    [facility]

 

                          HTN (hypertension)                                HTN 

(hypertension)                   

    Active                                                Problem               
        2020                                                Enayet Rahim  
                  Problem Active                  2020 05:11:04           

      [facility]

 

                          Carotid arterial disease                          Farah

tid arterial disease       

                Active                                                Problem   
                    2020                                                
Vivian Cedillo                     Problem Active                  2020 05:1

1:04                 

[facility]

 

                          PAD (peripheral artery disease)                       

  PAD (peripheral artery 

disease)                        Active                                          
     Problem                        2020                                  
             Vivian Cedillo                     Problem      Active               

                  2020 

05:11:04                                                    [facility]

 

                          Pain in left knee                                 Pain

 in left knee                     

  Active                                                Diagnosis               
        2017                                                Vivian Cedillo  
                  Diagnosis Active                  2017 05:10:49         

        [facility]

 

                          Other chronic pain                                Othe

r chronic pain                   

    Active                                                Problem               
        2020                                                Vivian Cedillo  
                  Problem Active                  2020 05:11:04           

      [facility]

 

                          Fall, initial encounter                           Fall

, initial encounter         

              Active                                                Diagnosis   
                    2020                                                
Vivian Cedillo                     Diagnosis Active                  2020 05

:10:53                 

[facility]

 

                          Acute pain of right shoulder                         A

cute pain of right 

shoulder                        Active                                          
     Diagnosis                        2018                                
               Vivian Cedillo                     Diagnosis    Active             

                    2018 

04:11:19                                                    [facility]

 

                          Chronic diastolic heart failure                       

  Chronic diastolic heart 

failure                        Active                                           
    Problem                        2020                                   
            Koffi Barriga MD, PA                     Problem      Active        

                         

2020 04:10:09                                         [facility]

 

                          Occlusion and stenosis of right carotid artery        

                 Occlusion

and stenosis of right carotid artery                        Active              
                                 Problem                        2020      
                                         Koffi Barriga MD, PA                   
        Problem Active                  2020 04:10:09                 [facility]

 

                          Abnormal electrocardiogram [ECG] [EKG]                

         Abnormal 

electrocardiogram [ECG] [EKG]                        Active                     
                           Problem                        2020            
                                    Koffi Barriga MD, PA                     

Problem   Active                        2020 04:10:09                     

[facility]

 

                          Obesity, unspecified                              Obes

ity, unspecified               

         Active                                                Problem          
              2020                                                Koffi Barriga MD, PA                     Problem Active                  2020 04

:10:09                 

[facility]

 

                          Body mass index (BMI) 38.0-38.9, adult                

         Body mass index 

(BMI) 38.0-38.9, adult                        Active                            
                    Problem                        2020                   
                             Koffi Barriga MD, PA                     Problem   

                

Active                           2020 04:10:09                       [facility]

 

                          Type 2 diabetes mellitus with diabetic peripheral melissa

opathy without gangrene   

                      Type 2 diabetes mellitus with diabetic peripheral 
angiopathy without gangrene                        Active                       
                         Problem                        2020              
                                  Koffi Barriga MD, PA                     

Problem   Active                        2020 04:10:09                     

[facility]

 

                          Coronary angioplasty status                         Co

ronary angioplasty status 

                       Active                                                
Problem                        2020                                       
         Koffi Barriga MD, PA                     Problem      Active           

                      2020 

04:10:09                                                    [facility]

 

                          Coronary atherosclerosis of native coronary artery    

                     

Coronary atherosclerosis of native coronary artery                        Active
                                                Problem                        
2020                                                Koffi Barriga MD, PA  
                   Problem Active                  2020 04:10:09          

       [facility]

 

                          Peripheral vascular disease                         Pe

ripheral vascular disease 

                       Active                                                
Problem                        2020                                       
         Koffi Barriga MD, PA                     Problem      Active           

                      2020 

04:10:09                                                    [facility]

 

                          Diabetes mellitus type II                         Diab

etes mellitus type II     

                   Active                                                Problem
                        2020                                              
  Koffi Barriga MD, PA                     Problem      Active                  

               2020 

04:10:09                                                    [facility]

 

                          Abnormal ECG                                      Abno

rmal ECG                        Active 

                                               Problem                        
2020                                                Koffi Barriga MD, PA  
                   Problem Active                  2020 04:10:09          

       [facility]

 

                          Morbid obesity                                    Morb

id obesity                        

Active                                                Problem                   
     2020                                                Koffi Barriga MD,
 PA                     Problem Active                  2020 04:10:09     

            [facility]

 

                          Postprocedural PCI status                         Post

procedural PCI status     

                   Active                                                Problem
                        2020                                              
  Koffi Barriga MD, PA                     Problem      Active                  

               2020 

04:10:09                                                    [facility]

 

                          Mixed hyperlipidemia                              Mixe

d hyperlipidemia               

         Active                                                Problem          
              2020                                                Koffi Barriga MD, PA                     Problem Active                  2020 04

:10:09                 

[facility]

 

                          Carotid stenosis                                  Farah

tid stenosis                       

 Active                                                Problem                  
      2020                                                Koffi Barriga MD, PA                     Problem Active                  2020 04:10:09  

               [facility]

 

                          Chronic systolic CHF (congestive heart failure)       

                  Chronic 

systolic CHF (congestive heart failure)                        Active           
                                     Problem                        2020  
                                              Enparisaet Rahim                     

Problem   Active                        2020 05:11:04                     

[facility]

 

                          PAF (paroxysmal atrial fibrillation)                  

       PAF (paroxysmal 

atrial fibrillation)                        Active                              
                  Problem                        2020                     
                           Enayet Rahim                     Problem      Active 

                                

2020 05:11:04                                         [facility]

 

                          Atrial fibrillation- Chronic                         A

trial fibrillation- 

Chronic                        Active                                           
     Problem                        2020                                  
              Koffi Barriga MD, PA                     Problem      Active      

                           

2020 04:10:09                                         [facility]

 

                          Perforated right tympanic membrane on examination     

                    

Perforated right tympanic membrane on examination                        Active 
                                               Diagnosis                        
10/24/2018                                                Enayet Rahim          
           Diagnosis Active                  2018-10-24 04:10:34                

 [facility]

 

                          Body mass index (BMI) 37.0-37.9, adult                

         Body mass index 

(BMI) 37.0-37.9, adult                        Active                            
                    Problem                        2020                   
                             Koffi Barriga MD, PA                     Problem   

                

Active                           2020 04:10:09                       [facility]

 

                          Other bilateral secondary osteoarthritis of knee      

                   Other 

bilateral secondary osteoarthritis of knee                        Active        
                                        Diagnosis                        
2017                                                [facility]          
           Diagnosis Active                  2017 05:33:23                

 [facility]

 

                          Acute tracheobronchitis                           Acut

e tracheobronchitis         

               Active                                                Problem    
                    2020                                                
Enparisaet Rahim                     Problem Active                  2020 05:1

1:04                 

[facility]

 

                          Other obesity due to excess calories                  

       Other obesity due 

to excess calories                        Active                                
                Problem                        2020                       
                         Koffi Barriga MD, PA                     Problem       

            Active

                                 2020 04:10:09                       [facility]

[facility]

 

                          BMI 35.0-35.9,adult                               BMI 

35.0-35.9,adult                 

       Active                                                Problem            
            2020                                                [facility]                     Problem Active                  2020 05:11:04   

              [facility]

 

                          Flu vaccine need                                  Flu 

vaccine need                       

 Active                                                Diagnosis                
        2019                                                Enparisaet Srinathhim  
                   Diagnosis Active                  2019 05:11:17        

         [facility]

 

                          Risk for falls                                    Risk

 for falls                        

Active                                                Diagnosis                 
       2020                                                [facility]   
                  Diagnosis Active                  2020 05:10:53         

        [facility]

 

                          Eye exam abnormal                                 Eye 

exam abnormal                     

   Active                                                Diagnosis              
          09/10/2019                                                [facility]
                     Diagnosis Active                  2019-09-10 04:10:50      

           [facility]

 

                          Pulmonary hypertension, unspecified                   

      Pulmonary 

hypertension, unspecified                        Active                         
                       Problem                        2020                
                                Koffi Barriga MD, PA                     Problem

          Active                        2020 04:10:09                     

[facility]

 

                          CAD (coronary artery disease)                         

CAD (coronary artery 

disease)                        Active                                          
      Problem                        2020                                 
               Enayet Rahim                     Problem      Active             

                    2020 

05:11:04                                                    [facility]

 

                          Bilateral leg edema                               Bila

teral leg edema                 

       Active                                                Diagnosis          
              2020                                                Enparisaet 
Rahim                     Diagnosis Active                  2020 04:11:12 

                [facility]

 

                          BMI 36.0-36.9,adult                               BMI 

36.0-36.9,adult                 

       Active                                                Problem            
            2020                                                Enayet 
Rahim                     Problem Active                  2020 05:11:04   

              [facility]

 

                          Cellulitis of leg without foot, left                  

       Cellulitis of leg 

without foot, left                        Active                                
                Diagnosis                        2020                     
                           Vivian Cedillo                     Diagnosis       Acti

ve                           

                2020 05:11:08                                 [facility]

 

                          Hematoma of left lower extremity, initial encounter   

                      

Hematoma of left lower extremity, initial encounter                        
Active                                                Diagnosis                 
       2020                                                Vivian Cedillo   
                  Diagnosis Active                  2020 05:10:53         

        [facility]

 

                          Transient Ischemic Attack                         [place]

sient Ischemic Attack     

                   Active                                                       
                 2014                                                [facility]                     Problem Active                  2014 22:03:

04                 

[facility]

 

                          Diabetes Mellitus With Peripheral Circulatory Disorder

                         

Diabetes Mellitus With Peripheral Circulatory Disorder                        
Active                                                                        
2014                                                [facility]         
            Problem Active                  2014 22:03:04                 

[facility]

 

                          Hypertension                                      Hype

rtension                        Active 

                                                                       
2014                                                [facility]         
            Problem Active                  2014 22:03:04                 

[facility]

 

                          Coronary Artery Disease                           Fran

nary Artery Disease         

               Active                                                           
             2014                                                [facility]                     Problem Active                  2014 22:03:

04                 

[facility]

 

                          Thyroid Function Tests Nonspecific Abnormal Findings  

                       

Thyroid Function Tests Nonspecific Abnormal Findings                        
Active                                                                        
2014                                                [facility]         
            Problem Active                  2014 22:03:04                 

[facility]

 

                          Vaccines Prophylactic Need Against Influenza          

               Vaccines 

Prophylactic Need Against Influenza                        Active               
                                                         2014             
                                   [facility]                     Problem    

               

Active                           2014 22:03:04                       [facility]

 

                          Vitamin D Deficiency                              Carrie

min D Deficiency               

         Active                                                                 
       2014                                                [facility]  
                   Problem Active                  2014 22:03:04          

       [facility]

 

                          Acute Bronchitis                                  [place]cut

e Bronchitis                       

 Active                                                                        
2014                                                [facility]         
            Problem Active                  2014 22:03:04                 

[facility]







Allergies, Adverse Reactions, Alerts





        Allergy Name Allergy Type Status  Severity Reaction(s) Onset Date Inacti

ve Date 

Treating Clinician        Comments                  Source

 

       N.K.D.A. N.K.D.A. Active        Info Not Available 2020 00:00:00   

                   [facility]

 

       No Known Drug Allergies No Known Drug Allergies Active                   

                        [facility]







Family History





           Family Member Diagnosis  Comments   Start Date Stop Date  Source

 

           Unknown Family Member Family History            2013 04:46:23 2

 04:46:23 

[facility]







Social History





           Social Habit Start Date Stop Date  Quantity   Comments   Source

 

           TobaccoUse: 2016 00:00:00 2016 00:00:00                  

     [facility]

 

           Social History 2014 22:03:04 2014 22:03:04               

        [facility]







Medications





             Ordered Medication Name Filled Medication Name Start Date   Stop Da

te    Current 

Medication? Ordering Clinician Indication Dosage     Frequency  Signature (SIG) 

Comments                  Components                Source

 

       Metformin HCl        2020 05:10:53        Yes    Sonja Tasia      

                1 tablet with meals

                                                            [facility]

 

      Clopidogrel Bisulfate       2020 05:10:53       Yes   Sonja Tasia  

                 1 tablet       

                                        [facility]

 

     Losartan Potassium      2020 05:10:53      Yes  Sonja Tasia         

       1 tablet           

[facility]

 

     Furosemide      2020 05:10:53      Yes  Sonja Tasia                1

 tablet           [facility]

 

     Novolin N      2020 05:10:53      Yes  Sonja Tasia                20

-25 units           

[facility]

 

     Novolin R      2020 05:10:53      Yes  Sonja Tasia                36

 units           [facility]

 

     Eliquis      2020 05:10:53      Yes  Sonja Tasia                1 ta

blet           [facility]

 

       Simvastatin        2020 05:10:53        Yes    Sonja Tasia        

              1 tablet in the 

evening                                                     [facility]

 

     Tamsulosin HCl      2020 05:10:53      Yes  Sonja Tasia             

   1 capsule           

[facility]

 

     Cephalexin      2020-11-10 00:00:00      Yes  Sonja Tasia                1

 capsule           [facility]

 

     Fish Oil      2020 04:10:09      Yes  Koffi Gagen Tayyan            

    1 capsule           

[facility]

 

       Metformin HCl        2020 04:10:09        Yes    Koffi Lorenzo

n                      1 tablet 

with meals                                                  [facility]

 

     Humalog      2020 04:10:09      Yes  Koffi Hernandezwan Tayyan             

   not defined           

[facility]

 

     Tradjenta      2020 04:10:09      Yes  Koffi Marwan Tayyan           

     1 tablet           

[facility]

 

     Levemir      2020 04:10:09      Yes  Koffi Hernandezwan Tayyan             

   not defined           

[facility]

 

       Losartan Potassium        2020 04:10:09        Yes    Koffi Hernandezwan 

Tayyan                      1 

tablet                                                      [facility]

 

       Simvastatin        2020 04:10:09        Yes    Koffiadonis Hernandezwan Tayyan 

                     1 tablet in 

the evening                                                 [facility]

 

     Furosemide      2020 04:10:09      Yes  Koffi Marwan Tayyan          

      1 tablet           

[facility]

 

       Metoprolol Succinate ER        2020 04:10:09        Yes    Koffi reynoso Kalebyan                      1 

tablet                                                      [facility]

 

       Metoprolol Succinate ER        2020 04:10:12        Yes    Koffi reynoso Kalebyan                      1 

tablet                                                      [facility]

 

       Furosemide        2020 04:10:27        Yes    Koffi Hernandezsylwia Kalebramírez  

                    TAKE 1 TABLET 

BY MOUTH ONCE DAILY FOR 30 DAYS                                         [facility]

 

     Tamsulosin HCl      2020 04:11:14      Yes  Sonja Tasia             

   1 capsule           

[facility]

 

       Simvastatin        2020 04:11:14        Yes    Sonja Tasia        

              1 tablet in the 

evening                                                     [facility]

 

     Tamsulosin HCl      2020 00:00:00      Yes  Sonja Tasia             

   1 capsule           

[facility]

 

       Isosorbide Mononitrate        2020 00:00:00        Yes    Koffi Mary Barriga                      1 

tablet                                                      [facility]

 

       Metoprolol Succinate ER        2019 04:10:07        Yes    Koffiadonis reynoso Nithya                      1 

tablet                                                      [facility]

 

     Simvastatin      2018-12-15 05:10:52      Yes  Sonja Tasia                

not defined           

[facility]

 

      Losartan Potassium       2018-12-15 05:10:52       Yes   Sonja Tasia     

              not defined       

                                        [facility]

 

       Azithromycin        2018 00:00:00        Yes    Sonja Tasia       

               2 tablets  on the 

first day, then 1 tablet daily for 4 days                                       

  [facility]

 

     PredniSONE      2018 00:00:00      Yes  Sonja Tasia                1

 tablet           [facility]

 

       Ciprodex        2018-10-18 00:00:00        Yes    Sonjamandi Palominowar           

           4 drops into affected 

ear                                                         [facility]

 

     Furosemide      2018 00:00:00      Yes  Koffi Manuelyan          

      1 tablet           

[facility]

 

     Eliquis      2018 00:00:00      Yes  Koffi Barriga             

   1 tablet           

[facility]

 

     Aspirin      2018 00:00:00      Yes  Koffi Barriga             

   1 tablet           

[facility]

 

       Clopidogrel Bisulfate        2018 04:11:19        Yes    Sonjamandi Palominow

ar                      not defined

                                                            [facility]

 

     Ramipril      2018 04:11:19      Yes  Sonja Tasia                not

 defined           [facility]

 

       Metoprolol Succinate ER        2018 04:11:19        Yes    Sonja Sa

rwar                      not 

defined                                                     [facility]

 

      Tramadol HCl       2018 00:00:00       Yes   Sonja Burris           

        1 tablet as needed  

                                                    [facility]

 

     Aspirin      2018 04:10:10      Yes  Koffi Manuelyan             

   1 tablet           

[facility]

 

       Metoprolol Succinate ER        2018 04:10:10        Yes    Koffi reynoso Tayyan                      1 

tablet                                                      [facility]

 

     Simvastatin      2017 05:10:49      Yes  Sonja Tasia                

not defined           

[facility]

 

       Metoprolol Succinate ER        2017 05:10:49        Yes    Sonja Sa

rwar                      not 

defined                                                     [facility]

 

     Metformin HCl      2017 05:10:49      Yes  Sonja Tasia              

  not defined           

[facility]

 

     Ramipril      2017-10-13 04:10:15      Yes  Koffi Manuelyan            

    1 capsule           

[facility]

 

     Tamsulosin HCl      2017 04:10:17      Yes  Sonja Palominowar             

   1 capsule           

[facility]

 

     Tamsulosin HCl      2017-02-15 00:00:00      Yes  Sonja Tasia             

   1 capsule           

[facility]

 

     Ramipril      2017 05:14:09      Yes  Koffi Manuelyan            

    1 capsule           

[facility]

 

       Metoprolol Succinate ER        2017 05:14:09        Yes    Koffi dawsonwagner Tayyan                      1 

tablet                                                      [facility]

 

     Levemir      2017 05:14:09      Yes  Koffi Manuelyan             

   Unknown           

[facility]

 

       Metformin HCl        2017 05:14:09        Yes    Koffi Lorenzo

n                      1 tablet 

with meals                                                  [facility]

 

       Clopidogrel Bisulfate        2017 05:14:09        Yes    Koffi edward Tayyan                      1 

tablet                                                      [facility]

 

     Humalog      2017 05:14:09      Yes  Koffi Manuelyan             

   Unknown           

[facility]

 

     Fish Oil      2017 05:14:09      Yes  Koffi Manuelyan            

    1 capsule           

[facility]

 

       Simvastatin        2017 05:14:09        Yes    Koffi Manuelyan 

                     1 tablet in 

the evening                                                 [facility]

 

     Tradjenta      2017 05:14:09      Yes  Koffi Manuelyan           

     1 tablet           

[facility]

 

     Aspirin      2017 05:14:09      Yes  Koffi Woo Tayyan             

   1 tablet           

[facility]

 

     Januvia      2016 05:14:14      Yes  Koffi Manuelyan             

   2 tablets           

[facility]

 

       Azithromycin 250 MG Oral Tablet        2013 06:00:00        Yes    

                            ; Start 

Date: 2013; End Date: 1900 (Active)                                 

        [facility]

 

       Dexamethasone 4 MG Oral Tablet        2013 06:00:00        Yes     

                           ; Start Date:

 2013; End Date: 2013 (Active)                                      

   [facility]

 

       Accu-Chek Kati In Vitro Strip        2012 05:00:00        Yes     

                           ; Start Date:

 2012 (Active)                                         [facility]

 

       Accu-Chek Multiclix Lancets Miscellaneous        2012 05:00:00     

   Yes                                ; 

Start Date: 2012 (Active)                                         [facility]

 [place]

 

       Pen Needles 5/16" 31G X 8 MM Miscellaneous        2012 05:00:00    

    Yes                                ;

 Start Date: 2012 (Active)                                         [facility]

 

       Januvia 100 MG Oral Tablet        2012 05:00:00        Yes         

                       ; Start Date: 

2012 (Active)                                         [facility]

 

       MetFORMIN HCl 1000 MG Oral Tablet        2012 05:00:00        Yes  

                              ; Start 

Date: 2012 (Active)                                         [facility]

[facility]

 

          Levemir FlexPen 100 UNIT/ML Subcutaneous Solution           2012

 05:00:00           Yes                 

                                 ; Start Date: 2012 (Active)              

         [facility]

 

             Cyanocobalamin 2500 MCG Sublingual Tablet Sublingual              2

 05:00:00              Yes

                                        ; Start Date: 2012 (Active)       

          [facility]

 

       Clopidogrel Bisulfate 75 MG Oral Tablet        2012 05:00:00       

 Yes                                ; 

Start Date: 2012 (Active)                                         [facility]

 [place]

 

       Hydrochlorothiazide 12.5 MG Oral Tablet        2012 05:00:00       

 Yes                                ; 

Start Date: 2012 (Active)                                         [facility]

 [place]

 

          HumaLOG KwikPen 100 UNIT/ML Subcutaneous Solution           2012

 05:00:00           Yes                 

                                 ; Start Date: 2012 (Active)              

         [facility]

 

                    Metoprolol Succinate ER 50 MG Oral Tablet Extended Release 2

4 Hour                     2012 

05:00:00        Yes                                ; Start Date: 2012 (Act

rickey)               [facility]

 

       Ramipril 10 MG Oral Capsule        2012 05:00:00        Yes        

                        ; Start Date: 

2012 (Active)                                         [facility]

 

       Crestor 20 MG Oral Tablet        2012 05:00:00        Yes          

                      ; Start Date: 

2012 (Active)                                         [facility]

 

       Vitamin D 2000 UNIT Oral Capsule        2012 05:00:00        Yes   

                             ; Start 

Date: 2012 (Active)                                         [facility]

nn

 

       AmLODIPine Besylate 2.5 MG Oral Tablet        2012 05:00:00        

Yes                                ; 

Start Date: 2012 (Active)                                         [facility]

 

     Eliquis Eliquis           Yes            5         Twice A Day           [facility]

 

     Furosemide 40 Mg Tablet Furosemide 40 Mg Tablet           Yes            40

        Daily           [facility]

 

        Losartan Potassium 50 Mg Tablet Losartan Potassium 50 Mg Tablet         

        Yes                     50       

                Daily                                           [facility]

 

       Metformin Hcl 500 Mg Tablet Metformin Hcl 500 Mg Tablet               Yes

                  500           Twice 

A Day                                                       [facility]

 

                Metoprolol Succinate 100 Mg Tab.er.24h Metoprolol Succinate 100 

Mg Tab.er.24h                 

        Yes                     100             Daily                   [facility]

 

       Simvastatin 20 Mg Tablet Simvastatin 20 Mg Tablet               Yes      

            20            Today At 

9:00PM                                                      [facility]

 

        Tamsulosin Hcl 0.4 Mg Cap.er.24h Tamsulosin Hcl 0.4 Mg Cap.er.24h       

          Yes                     .4

                          Daily                                  [facility]

 

                          Insulin Regular, Human (Novolin R) 100 Unit/1 Ml Vial,

 36 Units Sub-Q Insulin 

Regular, Human (Novolin R) 100 Unit/1 Ml Vial, 36 Units Sub-Q                   

        2018 

00:00:00 No                      36              Before Meals                 [facility]

 

                          Levofloxacin (Levaquin) 500 Mg Tablet, 500 Mg Oral Lev

ofloxacin (Levaquin) 500 

Mg Tablet, 500 Mg Oral       2018 00:00:00 No                500         D

aily             [facility]







Vital Signs





             Vital Name   Observation Time Observation Value Comments     Source

 

             Weight       2020 21:15:00                           [facility]

 [place]

 

             Height       2020 21:15:00                           [facility]

 

             Temperature Oral (F) 2020 21:15:00 97.4 F                    

[facility]

 

             Diastolic (mm Hg) 2020 21:15:00                           [facility] [place]

 

             Systolic (mm Hg) 2020 21:15:00                           [facility]

[facility]

 

             Weight       2020-11-10 20:30:00                           [facility]

 [facility]

 

             Height       2020-11-10 20:30:00                           [facility]

 

             Temperature Oral (F) 2020-11-10 20:30:00 98 F                      

[facility]

 

             Diastolic (mm Hg) 2020-11-10 20:30:00                           [facility] [place]

 

             Systolic (mm Hg) 2020-11-10 20:30:00                           [facility]

[facility] [place]

 

             Weight       2020-10-27 21:15:00                           [facility]

 [place]

 

             Height       2020-10-27 21:15:00                           [facility]

 [place]

 

             Temperature Oral (F) 2020-10-27 21:15:00 97.5 F                    

[facility]

 

             Diastolic (mm Hg) 2020-10-27 21:15:00                           [facility] [place]

 

             Systolic (mm Hg) 2020-10-27 21:15:00                           [facility]

[facility] [place]

 

             Weight       2020 18:15:00                           [facility]

 [place]

 

             Heart Rate   2020 18:15:00                           [facility]

 [place]

 

             Diastolic (mm Hg) 2020 18:15:00                           [facility]

[facility] [place]

 

             Systolic (mm Hg) 2020 18:15:00                           [facility]

[facility] [place]

 

             Weight       2020 20:00:00                           [facility]

 [place]

 

             Height       2020 20:00:00                           [facility]

 [place]

 

             Temperature Oral (F) 2020 20:00:00 97.3 F                    

[facility]

 

             Diastolic (mm Hg) 2020 20:00:00                           [facility] [place]

 

             Systolic (mm Hg) 2020 20:00:00                           [facility]

[facility] [place]

 

             Weight       2020 19:00:00                           [facility]

 [place]

 

             Heart Rate   2020 19:00:00                           [facility]

 [place]

 

             Diastolic (mm Hg) 2020 19:00:00                           [facility] [place]

 

             Systolic (mm Hg) 2020 19:00:00                           [facility]

[facility] [place]

 

             Weight       2020 14:00:00                           [facility]

 [place]

 

             Height       2020 14:00:00                           [facility]

 [place]

 

             Temperature Oral (F) 2020 14:00:00 97.5 F                    

[facility]

 

             Diastolic (mm Hg) 2020 14:00:00                           [facility]

[facility] [place]

 

             Systolic (mm Hg) 2020 14:00:00                           [facility]

[facility] [place]

 

             Weight       2020 20:15:00                           [facility]

 [place]

 

             Heart Rate   2020 20:15:00                           [facility]

 [place]

 

             Diastolic (mm Hg) 2020 20:15:00                           [facility]

[facility] [place]

 

             Systolic (mm Hg) 2020 20:15:00                           [facility]

[facility] [place]

 

             Weight       2020 20:15:00                           [facility]

 [place]

 

             Heart Rate   2020 20:15:00                           [facility]

 [place]

 

             Diastolic (mm Hg) 2020 20:15:00                           [facility]

[facility] [place]

 

             Systolic (mm Hg) 2020 20:15:00                           [facility]

[facility] [place]

 

             Weight       2019 19:45:00                           [facility]

 [place]

 

             Height       2019 19:45:00                           [facility]

 [place]

 

             Temperature Oral (F) 2019 19:45:00 97.5 F                    

[facility]

 

             Diastolic (mm Hg) 2019 19:45:00                           [facility] [place]

 

             Systolic (mm Hg) 2019 19:45:00                           [facility]

[facility] [place]

 

             Weight       2019-10-02 18:30:00                           [facility]

 [place]

 

             Heart Rate   2019-10-02 18:30:00                           [facility]

 [place]

 

             Diastolic (mm Hg) 2019-10-02 18:30:00                           [facility]

[facility] [place]

 

             Systolic (mm Hg) 2019-10-02 18:30:00                           [facility]

[facility] [place]

 

             Weight       2019 21:00:00                           [facility]

 [place]

 

             Height       2019 21:00:00                           [facility]

 [place]

 

             Temperature Oral (F) 2019 21:00:00 98.2 F                    

[facility]

 

             Diastolic (mm Hg) 2019 21:00:00                           [facility]

[facility] [place]

 

             Systolic (mm Hg) 2019 21:00:00                           [facility]

[facility] [place]

 

             Weight       2019 20:15:00                           [facility]

 [place]

 

             Height       2019 20:15:00                           [facility]

 [place]

 

             Temperature Oral (F) 2019 20:15:00 97.0 F                    

[facility]

 

             Diastolic (mm Hg) 2019 20:15:00                           [facility]

[facility] [place]

 

             Systolic (mm Hg) 2019 20:15:00                           [facility]

[facility] [place]

 

             Weight       2019 15:15:00                           [facility]

 [place]

 

             Heart Rate   2019 15:15:00                           [facility]

 [place]

 

             Diastolic (mm Hg) 2019 15:15:00                           [facility]

[facility] [place]

 

             Systolic (mm Hg) 2019 15:15:00                           [facility]

[facility] [place]

 

             Weight       2018 15:45:00                           [facility]

 [place]

 

             Height       2018 15:45:00                           [facility]

 [place]

 

             Temperature Oral (F) 2018 15:45:00 102.1 F                   

[facility]

 

             Diastolic (mm Hg) 2018 15:45:00                           [facility]

[facility] [place]

 

             Systolic (mm Hg) 2018 15:45:00                           [facility]

[facility] [place]

 

             Weight       2018 20:00:00                           [facility]

 [place]

 

             Height       2018 20:00:00                           [facility]

 [place]

 

             Temperature Oral (F) 2018 20:00:00 98.6 F                    

[facility]

 

             Diastolic (mm Hg) 2018 20:00:00                           [facility]

[facility] [place]

 

             Systolic (mm Hg) 2018 20:00:00                           [facility]

[facility] [place]

 

             Weight       2018-10-23 16:00:00                           [facility]

 [place]

 

             Heart Rate   2018-10-23 16:00:00                           [facility]

 [place]

 

             Diastolic (mm Hg) 2018-10-23 16:00:00                           [facility]

[facility] [place]

 

             Systolic (mm Hg) 2018-10-23 16:00:00                           [facility]

rial [place]

 

             Weight       2018-10-18 18:45:00                           [facility]

 [place]

 

             Height       2018-10-18 18:45:00                           [facility]

 [place]

 

             Temperature Oral (F) 2018-10-18 18:45:00 98.7 F                    

[facility] [place]

 

             Diastolic (mm Hg) 2018-10-18 18:45:00                           [facility]

[facility] [place]

 

             Systolic (mm Hg) 2018-10-18 18:45:00                           [facility]

[facility] [place]

 

             Weight       2018 19:15:00                           [facility]

 [place]

 

             Heart Rate   2018 19:15:00                           [facility]

 [place]

 

             Diastolic (mm Hg) 2018 19:15:00                           [facility]

[facility] [place]

 

             Systolic (mm Hg) 2018 19:15:00                           [facility]

[facility] [place]

 

             Weight       2018 21:00:00                           [facility]

 [place]

 

             Heart Rate   2018 21:00:00                           [facility]

 [place]

 

             Diastolic (mm Hg) 2018 21:00:00                           [facility]

[facility] [place]

 

             Systolic (mm Hg) 2018 21:00:00                           Ant

lena [place]

 

             Weight       2018 18:15:00                           [facility]

 [place]

 

             Height       2018 18:15:00                           [facility]

 [place]

 

             Temperature Oral (F) 2018 18:15:00 98.4 F                    

[facility] [place]

 

             Diastolic (mm Hg) 2018 18:15:00                           [facility]

[facility] [place]

 

             Systolic (mm Hg) 2018 18:15:00                           [facility]

[facility] [place]

 

             Weight       2018 18:15:00                           [facility]

 [place]

 

             Heart Rate   2018 18:15:00                           [facility]

 [place]

 

             Diastolic (mm Hg) 2018 18:15:00                           [facility]

[facility] [place]

 

             Systolic (mm Hg) 2018 18:15:00                           [facility]

[facility] [place]

 

             Weight       2017 20:45:00                           [facility]

 [place]

 

             Height       2017 20:45:00                           [facility]

 [place]

 

             Temperature Oral (F) 2017 20:45:00 97.5 F                    

[facility] [place]

 

             Diastolic (mm Hg) 2017 20:45:00                           [facility]

[facility] [place]

 

             Systolic (mm Hg) 2017 20:45:00                           Ant

len[facility] [place]

 

             Weight       2017-10-05 18:45:00                           [facility]

 [place]

 

             Heart Rate   2017-10-05 18:45:00                           [facility]

 [place]

 

             Diastolic (mm Hg) 2017-10-05 18:45:00                           [facility]

[facility] [place]

 

             Systolic (mm Hg) 2017-10-05 18:45:00                           [facility]

[facility] [place]

 

             Weight       2017 15:45:00                           [facility]

 [place]

 

             Height       2017 15:45:00                           [facility]

 [place]

 

             Temperature Oral (F) 2017 15:45:00 98.3 F                    

[facility] [place]

 

             Diastolic (mm Hg) 2017 15:45:00                           [facility]

[facility] [place]

 

             Systolic (mm Hg) 2017 15:45:00                           [facility]

[facility] [place]

 

             Weight       2017 20:00:00                           [facility]

 [place]

 

             Heart Rate   2017 20:00:00                           [facility]

 [place]

 

             Diastolic (mm Hg) 2017 20:00:00                           [facility]

[facility] [place]

 

             Systolic (mm Hg) 2017 20:00:00                           [facility]

[facility] [place]

 

             Weight       2016 22:02:00                           [facility]

 [place]

 

             Height       2016 22:02:00                           [facility]

 [place]

 

             Temperature Oral (F) 2016 22:02:00 99.1 F                    

[facility] [place]

 

             Diastolic (mm Hg) 2016 22:02:00                           [facility]

[facility] [place]

 

             Systolic (mm Hg) 2016 22:02:00                           Ant

[facility] [place]

 

             Weight       2016 19:15:00                           [facility]

 [place]

 

             Heart Rate   2016 19:15:00                           [facility]

 [place]

 

             Diastolic (mm Hg) 2016 19:15:00                           [facility]

[facility] [place]

 

             Systolic (mm Hg) 2016 19:15:00                           [facility]

[facility] [place]

 

             Weight       2016 20:30:00                           [facility]

 [place]

 

             Heart Rate   2016 20:30:00                           [facility]

 [place]

 

             Diastolic (mm Hg) 2016 20:30:00                           [facility]

[facility] [place]

 

             Systolic (mm Hg) 2016 20:30:00                           Ant

rial [place]







Procedures





                Procedure       Date / Time Performed Performing Clinician [facility]

e

 

                EGD with biopsy 2018 00:00:00 CHRISTAL SALAS [facility]

 

                X-ray of chest, two views 2018 00:00:00 XAVI FRANKLIN CH

[facility]

 

                Computed tomography of chest with contrast 2018 00:00:00 MELANIE PUGH 

[facility]

 

                X-ray of chest, two views 2018 00:00:00 XAVI FRANKLIN CH

[facility]

 

                    Magnetic resonance imaging of brain without contrast 2018 00:00:00 AMY COATES                               [facility]







Plan of Care





             Planned Activity Planned Date Details      Comments     Source

 

             Future Scheduled Test 2013 15:34:07 Plan of Care [code = 1877

6-5]              

[facility] [place]







Encounters





             Start Date/Time End Date/Time Encounter Type Admission Type Attendi

 Clinicians

                Care Facility   Care Department Encounter ID    Source

 

           2020 15:15:00 2020 15:15:00 Outpatient                   

    Inpatient Providers [facility]               Inpatient [facility] [id number]              eClinicalWo

rks

 

           2020 15:03:00 2020 15:03:00 Outpatient                   

    Inpatient Providers [facility] [id number]              eClinicalWo

rks

 

           2020-11-10 14:30:00 2020-11-10 14:30:00 Outpatient                   

    Inpatient Providers [facility]               Inpatient [facility] [id number]              eClinicalWo

rks

 

           2020-10-27 15:15:00 2020-10-27 15:15:00 Outpatient                   

    Inpatient Providers [facility] [id number]              eClinicalWo

rks

 

          2020-09-10 13:00:00 2020-09-10 13:00:00 Outpatient                    

 [facility]              [id number]                    [facility]

 

          2020 13:15:00 2020 13:15:00 Outpatient                    

 [facility]              [id number]                    [facility]

 

          2020 16:20:00 2020 16:20:00 Outpatient                    

 [facility]              [id number]                    [facility]

 

           2020 15:00:00 2020 15:00:00 Outpatient                   

    Inpatient Providers [facility] [id number]              eClinicalWo

rks

 

          2020 14:00:00 2020 14:00:00 Outpatient                    

 [facility]              [id number]                    [facility]

 

           2020 09:00:00 2020 09:00:00 Outpatient                   

    Inpatient Providers [facility] [id number]              eClinicalWo

rks

 

          2020 14:00:00 2020 14:00:00 Outpatient                    

 [facility]              [id number]                    [facility]

 

          2020 14:15:00 2020 14:15:00 Outpatient                    

 [facility]              [id number]                    [facility]

 

        2020 08:03:00 2020 08:03:00 Outpatient                 [facility]     [id number]    [facility]

 

          2020 14:15:00 2020 14:15:00 Outpatient                    

 Koffi Vernon              [id number]                    [facility]

 

           2019 13:45:00 2019 13:45:00 Outpatient                   

    Inpatient Providers of 

[place]               Inpatient Providers [facility] [id number]              eClinicalWo

rks

 

          2019-10-03 10:00:00 2019-10-03 10:00:00 Outpatient                    

 [facility]              [id number]                    [facility]

 

          2019-10-02 13:30:00 2019-10-02 13:30:00 Outpatient                    

 [facility]              [id number]                     [facility]

 

           2019 16:00:00 2019 16:00:00 Outpatient                   

    Inpatient Providers of 

[place]               Inpatient Providers [facility] [id number]              eClinicalWo

rks

 

           2019 15:15:00 2019 15:15:00 Outpatient                   

    Inpatient Providers of 

[place]               Inpatient Providers [facility] [id number]              eClinicalWo

rks

 

          2019 09:15:00 2019 09:15:00 Outpatient                    

 [facility]              [id number]                    [facility]

 

          2019 10:15:00 2019 10:15:00 Outpatient                    

 [facility]              [id number]                     [facility]

 

           2019-01-10 10:22:00 2019-01-10 10:22:00 Outpatient                   

    [facility] [id number]               eClinicalWor

ks

 

           2018 12:15:00 2018 19:21:00 Discharged Inpatient 1       

   EVETTE BONILLA 

[facility]              [id number]        [facility]

 

           2018 09:45:00 2018 09:45:00 Outpatient                   

    Inpatient Providers of 

[place]               Inpatient Providers [facility] [id number]              eClinicalWo

rks

 

           2018 14:00:00 2018 14:00:00 Outpatient                   

    Inpatient Providers of 

[place]               Inpatient Providers [facility] [id number]              eClinicalWo

rks

 

          2018-10-23 11:00:00 2018-10-23 11:00:00 Outpatient                    

 Koffi Vernon              [id number]                     [facility]

 

           2018-10-18 13:45:00 2018-10-18 13:45:00 Outpatient                   

    Inpatient Providers of 

[place]               Inpatient Providers [facility] [id number]              eClinicalWo

rks

 

          2018-10-09 10:00:00 2018-10-09 10:00:00 Outpatient                    

 [facility]              [id number]                     [facility]

 

          2018 14:15:00 2018 14:15:00 Outpatient                    

 [facility]              [id number]                     [facility]

 

          2018 16:00:00 2018 16:00:00 Outpatient                    

 [facility]              [id number]                     [facility]

 

           2018 13:15:00 2018 13:15:00 Outpatient                   

    Inpatient Providers [facility] [id number]              eClinicalWo

rks

 

          2018 13:15:00 2018 13:15:00 Outpatient                    

 [facility]              [id number]                     [facility]

 

          2018 11:47:00 2018 11:47:00 Outpatient                    

 [facility]              [id number]                     [facility]

 

             2018 20:20:00 2018 15:17:00 Discharged Inpatient AMY HUTCHISON

                [facility]          [id number]    [facility]

 

           2017 14:45:00 2017 14:45:00 Outpatient                   

    Inpatient Providers of 

[facility] Providers [facility] [id number]              eClinicalWo

rks

 

          2017-10-17 11:00:00 2017-10-17 11:00:00 Outpatient                    

 [facility]              [id number]                     [facility]

 

          2017-10-05 13:45:00 2017-10-05 13:45:00 Outpatient                    

 [facility]              [id number]                     [facility]

 

           2017 10:45:00 2017 10:45:00 Outpatient                   

    Inpatient Providers [facility]               Inpatient [facility] [id number]               eClinicalWo

rks

 

          2017 14:00:00 2017 14:00:00 Outpatient                    

 [facility] [place] 

[place]      [id number]                     [facility]

 

          2016 16:02:00 2016 16:02:00 Outpatient                    

 Vivian [facility]illo [place] 

[place]      [id number]                     [facility]

 

          2016 15:00:00 2016 15:00:00 Outpatient                    

 [place]      [id number]                     [facility]

 

          2016 14:15:00 2016 14:15:00 Outpatient                    

 [facility] [place] 

[place]      [id number]                     [facility]

 

          2016 14:30:00 2016 14:30:00 Outpatient                    

 [facility] 

[place]      [id number]                     [facility]

 

        2014 17:03:05 2014 17:03:04 Outpatient                 [facility] 

   [facility]    [id number]  

 

        2014 15:22:58 2014 15:22:57 Outpatient                 [facility] 

   [facility]    [id number]  

 

        2013 11:33:13 2013 11:33:13 Outpatient                 [facility] 

   [facility]    [id number]  

 

        2013 07:00:13 2013 07:00:13 Outpatient                 [facility] 

   [facility]    [id number]  

 

        2013 10:34:08 2013 10:34:07 Outpatient                 [facility] 

   [facility]    [id number]  

 

        2013 06:30:15 2013 06:30:14 Outpatient                 [facility] 

   [facility]    [id number]  

 

        2013 04:14:03 2013 04:13:44 Outpatient                 [facility] 

   [facility]    [id number]  

 

        2013 23:46:52 2013 23:46:23 Outpatient                 [facility] 

   [facility]    [id number]  







Results





           Test Description Test Time  Test Comments Results    Result Comments 

Source

 

                CT LEFT LOWER EXTREMITY W 2020 15:41:00                 

************************************************************[facility]        : 1945        Sex: 
M************************************************************                   
                                                                  [facility]                        [address] [address]      Patient Name: ANDREW DONALDSON JR        
                       MR #: [id number]                  : 1945        
                          Age/Sex: 75/M  Acct #: [id number]                   
          Req #: [id number]  [facility] Physician:                                     
                Ordered by: VINCE SUERO MD                         Report #: 
[id number]        Location: ER                                      Room/Bed:    
              
________________________________________________________________________________

___________________    Procedure: [id number] CT/CT LEFT LOWER EXTREMITY W  Exam 
Date: 20                            Exam Time: 1425                       
                      REPORT STATUS: Signed    EXAM: CT of the  site without 
contrast      INDICATION: Status post fall with left lower extremity pain and 
difficulty   bearing weight.      COMPARISON: None available.      TECHNIQUE: 
Multidetector CT scanning of the site  was performed. Coronal and   sagittal 
multiplanar reformations were obtained.      RADIATION DOSE:         Total DLP: 
   mGy*cm        Estimated effective dose: (DLP x 0.014 x size factor) mSv      
 CTDIvol has been reviewed. It is below the limits set by the Radiation   
Protocol Committee (RPC).        Dose modulation, iterative reconstruction, 
and/or weight based adjustment   of the mA/kV was utilized to reduce the rad
iation dose to as low as reasonably   achievable.          FINDINGS:       
Bones: No acute displaced fracture identified.      Joints: There are mild to 
moderate degenerative changes of the left hip   characterized by joint space 
narrowing and marginal osteophytes. There are   severe tricompartmental 
degenerative changes of the left knee characterized by   severe joint space 
narrowing, subchondral cystic changes, subchondral sclerosis   and 
marginal/fragmented osteophytes.      Soft Tissues: There is ill-defined mild 
prominence of the mid deep quadriceps   muscles suggestive of intramuscular 
hematoma. No evidence of active   extravasation. There is moderate to severe 
atherosclerotic calcification of the   lower extremity vasculature.      Others:
The partially imaged pelvis is normal.      IMPRESSION:   1.  Ill-defined mild 
prominence of the mid deep quadriceps muscles suggestive   of intramuscular 
hematoma. No evidence of active extravasation.   2.  No acute fracture or 
evidence of dislocation.   3.  Severe tricompartmental osteoarthritis of the 
left knee.      Signed by: Jonna Aguilar MD on 2020 3:51 PM        
Dictated By: JONNA AGUILAR MD  Electronically Signed By: JONNA AGUILAR MD on 
20  Transcribed By: NGUYEN on 20       COPY TO:   
VINCE SUERO MD                                     

 

                CHEST SINGLE (PORTABLE) 2020 15:38:00                 

[facility]        : 1945        Sex: 
M************************************************************                   
                                                                  [facility]                        [address] [place] [address]      Patient Name: ANDREW DONALDSON JR        
                       MR #: [id number]                  : 1945        
                          Age/Sex: 75/M  Acct #: [id number]                   
          Req #: [id number]  Adm Physician:                                     
                Ordered by: VINCE SUERO MD                         Report #: 
[id number]        Location:                                       Room/Bed:    
              
________________________________________________________________________________

___________________    Procedure: [id number] DX/CHEST SINGLE (PORTABLE)  Exam 
Date: 20                            Exam Time: 1415                       
                      REPORT STATUS: Signed    EXAMINATION:  CHEST SINGLE 
(PORTABLE)          INDICATION:          FALL 9 D AGO    2020      
COMPARISON:  Multiple prior chest radiograph including most recent on   
2018. Chest CT on 2018.           FINDINGS:          TUBES and 
LINES:  None.      LUNGS:  Low lung volumes with bronchovascular crowding. There
is diffuse patchy   airspace opacities and interstitial prominence bilaterally. 
    PLEURA:  No pleural effusion or pneumothorax. Left pleural scarring, 
unchanged.      HEART AND MEDIASTINUM:  The cardiomediastinal silhouette is 
unremarkable. There   are atherosclerotic calcifications within the aorta.      
BONES AND SOFT TISSUES:  No acute osseous lesion.  Soft tissues are   
unremarkable.      UPPER ABDOMEN: No free air under the diaphragm.          
IMPRESSION:       Diffuse interstitial and patchy airspace opacities which may 
represent   combination of bronchovascular crowding and subsegmental 
atelectasis. However,   multifocal pneumonia can have a similar appearance and 
should be considered in   the proper clinical context.         Signed by: 
Jonna Aguilar MD on 2020 3:41 PM        Dictated By: JONNA AGUILAR MD  
Electronically Signed By: JONNA AGUILAR MD on 20  Transcribed By: 
NGUYEN on 20       COPY TO:   VINCE SUERO MD                      

               

 

                CT BRAIN WO     2020 14:53:00                 

************************************************************[facility]        : 1945        Sex: 
M************************************************************                   
                                                                  [facility]                        [address] [address]      Patient Name: ANDREW DOANLDSON JR        
                       MR #: [id number]                  : 1945        
                          Age/Sex: 75/M  Acct #: [id number]                   
          Req #: [id number]  Adm Physician:                                     
                Ordered by: VINCE SUERO MD                         Report #: 
[id number]        Location: ER                                      Room/Bed:    
              
________________________________________________________________________________

___________________    Procedure: [id number] CT/CT BRAIN WO  Exam Date: 20 
                          Exam Time: 1425                                       
      REPORT STATUS: Signed    Exam: Head CT without contrast   History:  
Trauma, fall on [place]   Comparison studies:  Brain MRI 3/2/2018      
Technique:   Axial images were obtained from the skull base to the vertex.   
Coronal and sagittal images reconstructed from the axial data.  Dose   
modulation, iterative reconstruction, and/or weight based adjustment of the   
mA/kV was utilized to reduce the radiation dose to as low as reasonably   
achievable.        Radiation dose:    Total DLP: 921 mGy*cm.    Estimated 
effective dose: DLP x 0.015    Intravenous contrast: None      Findings:      
Scalp: No abnormalities.   Bones: No fractures, blastic or lytic lesions.      
Brain sulci: Mildly prominent.   Ventricles: Mild compensatory dilatation. No 
hydrocephalus.   Extra-axial spaces: No masses, no fluid collection.       
Parenchyma:    A few scattered hypodensities in the supratentorial white matter 
are   nonspecific but are most compatible with chronic microvascular ischemic   
changes. Small chronic lacunar infarct in the left lateral putamen is   
unchanged. No mass, acute hemorrhage or acute or chronic cortical insults.      
Sellar/suprasellar region: No abnormalities.   Craniocervical junction: Patent 
foramen magnum. No Chiari one malformation.      Incidental findings:    Bilate
ral intraocular lens replacements   Atherosclerotic calcifications in the distal
right cervical internal carotid   artery, bilateral carotid siphons and left 
intradural vertebral artery.      IMPRESSION:       No acute intracranial 
abnormalities.   Specifically, no acute intracranial hemorrhage.      Chronic 
findings:   1.  Minimal generalized parenchymal volume loss.   2.  Chronic 
lacunar infarct in the left putamen.   3.  Mild microvascular ischemic changes. 
    Signed by: Dr. Xavi Salas M.D. on 2020 3:00 PM        Dictated By:
XAVI SALAS MD  Electronically Signed By: XAVI SALAS MD on 20  Transcribed By: NGUYEN on 20 1500       COPY TO:   VINCE SUERO MD
                                                     

 

                    Bedside Glucose     2018 17:35:00   

 

                                        Test Item

 

             Bedside Glucose (test code = 02278-4) 257                 H  

           





Meter ID: [id number] [facility]
2018 16:41:00* 



             Test Item    Value        Reference Range Interpretation Comments

 

             Magnesium Level (test code = 73683-5) 1.6          1.3-2.1         

           





[facility] HospitalVancomycin Level Gpjwjd1421-08-94 
11:10:00* 



             Test Item    Value        Reference Range Interpretation Comments

 

             Vancomycin Level Trough (test code = 4092-3) 6.7          5.0-10.0 

                  





[facility] HospitalCHEST 2 KIWGY3134-47-37 06:03:00         
                                                                            [place] [address]      Patient Name: ANDREW DONALDSON JR        
                          MR #: [id number]                     : 1945  
                                Age/Sex: 73/M  Acct #: [id number]             
                Req #: [id number]  Adm Physician: EVETTE BONILLA MD               
                      Ordered by: XAVI FRANKLIN MD                            
Report #: [id number]        Location: [facility]                                    
Room/Bed: [place] [address]          _______________________________________________
____________________________________________________    Procedure: [id number] DX/
CHEST 2 VIEWS  Exam Date: 18                            Exam Time: 0545   
                                          REPORT STATUS: Signed    CHEST 2 VIEW
S,          Technique: CHEST 2 VIEWS   Comparison: 2018   Clinical history
:  Follow-up CHF       DISCUSSION:   Limited by soft tissue attenuation. Contras
t is noted in the bowel in the upper   abdomen.      IMPRESSION:   1. Stable mil
dly enlarged cardiomediastinal silhouette.   2. Low lung volumes with bibasilar 
vascular crowding/atelectasis.   3. No effusion or pneumothorax.      Signed by:
Dr Tutu Mcqueen MD on 2018 6:09 AM        Dictated By: TUTU MCQUEEN MD  Electronically Signed By: TUTU MCQUEEN MD on 18  Transcribed B
y: NGUYEN on 18       COPY TO:   XAVI FRANKLIN MD        Sodium Level
2018 05:41:00* 



             Test Item    Value        Reference Range Interpretation Comments

 

             Sodium Level (test code = 2951-2) 138          136-145             

       





[facility] HospitalPotassium Mtnit0777-60-22 05:41:00* 



             Test Item    Value        Reference Range Interpretation Comments

 

             Potassium Level (test code = 2823-3) 3.7          3.5-5.1          

          





[facility] HospitalChloride Eqmix0950-92-99 05:41:00* 



             Test Item    Value        Reference Range Interpretation Comments

 

             Chloride Level (test code = 2075-0) 96                  L    

         





[facility] HospitalCarbon Dioxide Awwio4826-81-25 05:41:00* 



             Test Item    Value        Reference Range Interpretation Comments

 

             Carbon Dioxide Level (test code = 2028-9) 31           22-29       

 H             





[facility] HospitalAnion Hfd4324-80-01 05:41:00* 



             Test Item    Value        Reference Range Interpretation Comments

 

             Anion Gap (test code = 33037-3) 14.7         8-16                  

     





[facility] HospitalBlood Urea Rwveaxrd6277-42-92 05:41:00* 



             Test Item    Value        Reference Range Interpretation Comments

 

             Blood Urea Nitrogen (test code = 3094-0) 20           7-26         

              





[facility] HospitalCreatinine2018-12-20 05:41:00* 



             Test Item    Value        Reference Range Interpretation Comments

 

             Creatinine (test code = 2160-0) 0.85         0.72-1.25             

     





[facility] HospitalBUN/Creatinine Dgtlz5856-41-66 05:41:00* 



             Test Item    Value        Reference Range Interpretation Comments

 

             BUN/Creatinine Ratio (test code = 3097-3) 24           6-25        

               





[facility] HospitalEstimat Glomerular Filtration Rate
2018 05:41:00* 



             Test Item    Value        Reference Range Interpretation Comments

 

             Estimat Glomerular Filtration Rate (test code = 691318998) > 60    

     >60                        





Ranges were taken from the National Kidney Disease Education Program and the [facility] Foundation literature.Reference ranges:60 or greater: Fnpqkk04-61 (
for 3 consecutive months): Chronic kidney disease 15 or less: Kidney failureMethodist Southlake HospitalGlucose Lmjey3777-19-93 05:41:00* 



             Test Item    Value        Reference Range Interpretation Comments

 

             Glucose Level (test code = JBL8448) 207                 H    

         





[facility] HospitalCalcium Vudsj0535-07-90 05:41:00* 



             Test Item    Value        Reference Range Interpretation Comments

 

             Calcium Level (test code = 95508-5) 9.1          8.4-10.2          

         





[facility] HospitalWhite Blood Qdbze5623-43-78 05:12:00* 



             Test Item    Value        Reference Range Interpretation Comments

 

             White Blood Count (test code = 6690-2) 10.63        4.8-10.8       

            





[facility] HospitalRed Blood Ngvcc0088-46-27 05:12:00* 



             Test Item    Value        Reference Range Interpretation Comments

 

             Red Blood Count (test code = 789-8) 4.15         4.3-5.7      L    

         





[facility] HospitalHemoglobin2018-12-20 05:12:00* 



             Test Item    Value        Reference Range Interpretation Comments

 

             Hemoglobin (test code = 16463-7) 13.4         14.0-18.0    L       

      





[facility] HospitalHematocrit2018-12-20 05:12:00* 



             Test Item    Value        Reference Range Interpretation Comments

 

             Hematocrit (test code = 4544-3) 41.6         38.2-49.6             

     





[facility] HospitalMean Corpuscular Munxqc8976-61-29 
05:12:00* 



             Test Item    Value        Reference Range Interpretation Comments

 

             Mean Corpuscular Volume (test code = 787-2) 100.2        81-99     

   H             





[facility] HospitalMean Corpuscular Hyweczkxga3866-51-48 
05:12:00* 



             Test Item    Value        Reference Range Interpretation Comments

 

             Mean Corpuscular Hemoglobin (test code = 785-6) 32.3         28-32 

       H             





[facility] HospitalMean Corpuscular Hemoglobin Concent
2018 05:12:00* 



             Test Item    Value        Reference Range Interpretation Comments

 

             Mean Corpuscular Hemoglobin Concent (test code = 786-4) 32.2       

  31-35                      





[facility] HospitalRed Cell Distribution Kcogn8969-13-64 
05:12:00* 



             Test Item    Value        Reference Range Interpretation Comments

 

             Red Cell Distribution Width (test code = 79959-3) 13.7         11.7

-14.4                  





[facility] HospitalPlatelet Vmjcc4418-71-80 05:12:00* 



             Test Item    Value        Reference Range Interpretation Comments

 

             Platelet Count (test code = 777-3) 265          140-360            

        





[facility] HospitalNeutrophils (%) (Auto)2018 05:12:00
  * 



             Test Item    Value        Reference Range Interpretation Comments

 

             Neutrophils (%) (Auto) (test code = 74051-3) 76.9         38.7-80.0

                  





[facility] HospitalLymphocytes (%) (Auto)2018 05:12:00
  * 



             Test Item    Value        Reference Range Interpretation Comments

 

             Lymphocytes (%) (Auto) (test code = 736-9) 11.8         18.0-39.1  

  L             





[facility] HospitalMonocytes (%) (Auto)2018 05:12:00* 



             Test Item    Value        Reference Range Interpretation Comments

 

             Monocytes (%) (Auto) (test code = 5905-5) 8.9          4.4-11.3    

               





[facility] HospitalEosinophils (%) (Auto)2018 05:12:00
  * 



             Test Item    Value        Reference Range Interpretation Comments

 

             Eosinophils (%) (Auto) (test code = 713-8) 1.3          0.0-6.0    

                





[facility] HospitalBasophils (%) (Auto)2018 05:12:00* 



             Test Item    Value        Reference Range Interpretation Comments

 

             Basophils (%) (Auto) (test code = 706-2) 0.4          0.0-1.0      

              





[facility] HospitalIM GRANULOCYTES %2018 05:12:00* 



             Test Item    Value        Reference Range Interpretation Comments

 

             IM GRANULOCYTES % (test code = IM GRANULOCYTES %) 0.7          0.0-

1.0                    





[facility] HospitalNeutrophils # (Auto)2018 05:12:00* 



             Test Item    Value        Reference Range Interpretation Comments

 

             Neutrophils # (Auto) (test code = 751-8) 8.2          2.1-6.9      

H             





[facility] HospitalLymphocytes # (Auto)2018 05:12:00* 



             Test Item    Value        Reference Range Interpretation Comments

 

             Lymphocytes # (Auto) (test code = 09941-4) 1.3          1.0-3.2    

                





[facility] HospitalMonocytes # (Auto)2018 05:12:00* 



             Test Item    Value        Reference Range Interpretation Comments

 

             Monocytes # (Auto) (test code = 742-7) 1.0          0.2-0.8      H 

            





[facility] HospitalEosinophils # (Auto)2018 05:12:00* 



             Test Item    Value        Reference Range Interpretation Comments

 

             Eosinophils # (Auto) (test code = 711-2) 0.1          0.0-0.4      

              





[facility] HospitalBasophils # (Auto)2018 05:12:00* 



             Test Item    Value        Reference Range Interpretation Comments

 

             Basophils # (Auto) (test code = 704-7) 0.0          0.0-0.1        

            





[facility] HospitalAbsolute Immature Granulocyte (auto
2018 05:12:00* 



             Test Item    Value        Reference Range Interpretation Comments

 

                                        Absolute Immature Granulocyte (auto (mary

t code = Absolute Immature Granulocyte 

(auto)          0.07            0-0.1                            





[facility] Centerputum Gapwitu0768-96-26 07:56:00* 



             Test Item    Value        Reference Range Interpretation Comments

 

             Sputum Culture (test code = 624-7) Organism: CANDIDA ALBICANS      

                      





[facility] HospitalBlood Ipkvukn1929-14-74 11:21:00* 



             Test Item    Value        Reference Range Interpretation Comments

 

             Blood Culture (test code = 43266104) NO GROWTH AFTER 5 DAYS, FINAL 

REPORT                            





[facility] HospitalHomocysteine2018-12-18 11:19:00* 



             Test Item    Value        Reference Range Interpretation Comments

 

             Homocysteine (test code = 23222-9) 11.3         0.0-15.0           

        





Performed at:  [facility]  [id number]
Director: Bowen Hansen MD, Phone:  [phone] Southlake HospitalBARIUM MADYMGY9633-34-00 17:00:00                                         
                                            [facility]   
                    [address] [place] [address]   
  Patient Name: ANDREW DONALDSON JR                                   MR #: 
[id number]                     : 1945                                  
Age/Sex: 73/M  Acct #: [id number]                              Req #: [id number]
[id number]  Adm Physician: EVETTE BONILLA MD                                      
Ordered by: CHRISTAL SALAS MD                            Report #: [id number]   
    Location: [facility]                                    Room/Bed: [place] [address]      
   ____________________________________________
_______________________________________________________    Procedure: [id number] 
DX/BARIUM SWALLOW  Exam Date: 18                            Exam Time: 161
5                                              REPORT STATUS: Signed    PROCEDUR
E:   BARIUM SWALLOW was performed with Sodium Carbonate and Barium.        OPERA
TORS:         COMPARISON: CT scan of the chest dated 2018       INDICATION
S: Esophageal wall thickening       FINDINGS:  The swallowing mechanism was terri
sly normal. The esophagus    was normally distensible and the mucosa was within 
normal limits.     Esophageal motility was within normal limits. No intrinsic or
extrinsic    abnormality involving the esophagus. No mucosal abnormality or    
intraluminal mass. There is a small hiatal hernia present. No reflux    identifi
ed.       The visualized portion of the stomach and proximal small bowel are    
unremarkable.       Fluoroscopy time: 1.3 minutes.   Total dose: 54.66 mGy      
    IMPRESSION:       1. Small hiatal hernia.   2. No esophageal mass.        AUBREY Jeong D.O.     Dictated by:  Chon Jeong D.O. on 2018 at 17:00 
      Electronically approved by:  Chon Jeong D.O. on 2018 at 17:00   
               Dictated By: CHON JEONG DO  Electronically Signed By: CHON JEONG DO on 18  Transcribed By: LINDSAY on 18       COPY T
O:   CHRISTAL SALAS MD        Vitamin B12 Neyzb3179-44-00 11:44:00* 



             Test Item    Value        Reference Range Interpretation Comments

 

             Vitamin B12 Level (test code = 03472-8) 1225         213-816      H

             





[facility] HospitalFolate2018-12-17 11:44:00* 



             Test Item    Value        Reference Range Interpretation Comments

 

             Folate (test code = 2284-8) 16.4         7.0-15.4     H            

 





[facility] HospitalFree Thyroxine Twksf8810-49-72 05:15:00* 



             Test Item    Value        Reference Range Interpretation Comments

 

             Free Thyroxine Index (test code = 07331-7) 3.9694       1.4-3.8    

  H             





[facility] HospitalThyroxine (T4)2018 05:15:00* 



             Test Item    Value        Reference Range Interpretation Comments

 

             Thyroxine (T4) (test code = 3026-2) 9.51         4.5-10.9          

         





[facility] HospitalTriiodothyronine (T3) Fgwtgo5821-53-35 
05:15:00* 



             Test Item    Value        Reference Range Interpretation Comments

 

             Triiodothyronine (T3) Uptake (test code = 3050-2) 41.74        22.5

-37.0    H             





[facility] HospitalThyroid Stimulating Hormone (TSH)
2018 05:15:00* 



             Test Item    Value        Reference Range Interpretation Comments

 

             Thyroid Stimulating Hormone (TSH) (test code = 52920-5) 0.031      

  0.350-4.940  L             





[facility] HospitalHemoglobin A1c Percent2018-12-15 07:40:00
  * 



             Test Item    Value        Reference Range Interpretation Comments

 

             Hemoglobin A1c Percent (test code = Hemoglobin A1c Percent) 7.2    

      4.0-7.0      H             





[facility] HospitalDifferential Total Cells Counted
2018 09:55:00* 



             Test Item    Value        Reference Range Interpretation Comments

 

                          Differential Total Cells Counted (test code = Differen

tial Total Cells Counted) 

100                                                          





[facility] HospitalNeutrophils % (Manual)2018 09:55:00
  * 



             Test Item    Value        Reference Range Interpretation Comments

 

             Neutrophils % (Manual) (test code = 42249-9) 82           40-74    

    H             





[facility] HospitalLymphocytes % (Manual)2018 09:55:00
  * 



             Test Item    Value        Reference Range Interpretation Comments

 

             Lymphocytes % (Manual) (test code = 737-7) 7            19-48      

  L             





[facility] HospitalMonocytes % (Manual)2018 09:55:00* 



             Test Item    Value        Reference Range Interpretation Comments

 

             Monocytes % (Manual) (test code = 744-3) 7            3.4-9.0      

              





[facility] HospitalMyelocytes %2018 09:55:00* 



             Test Item    Value        Reference Range Interpretation Comments

 

             Myelocytes % (test code = 749-2) 1            0-0          H       

      





[facility] HospitalReactive Qyxoimppfhe6412-42-26 09:55:00* 



             Test Item    Value        Reference Range Interpretation Comments

 

             Reactive Lymphocytes (test code = 24909-8) 3                       

                





[facility] HospitalNucleated Red Blood Alqyd5331-63-68 
09:55:00* 



             Test Item    Value        Reference Range Interpretation Comments

 

             Nucleated Red Blood Cells (test code = 75127-0) 2                  

                     





[facility] HospitalPlatelet Zlrojmez0805-76-42 09:55:00* 



             Test Item    Value        Reference Range Interpretation Comments

 

             Platelet Estimate (test code = 61786-1) ADEQUATE                   

             





[facility] HospitalPlatelet Morphology Xtwbejx0402-08-65 
09:55:00* 



             Test Item    Value        Reference Range Interpretation Comments

 

             Platelet Morphology Comment (test code = 45730-2) NORMAL           

                       





[facility] HospitalHypochromasia2018-12-14 09:55:00* 



             Test Item    Value        Reference Range Interpretation Comments

 

             Hypochromasia (test code = 728-6) SLIGHT                           

       





[facility] HospitalAnisocytosis2018-12-14 09:55:00* 



             Test Item    Value        Reference Range Interpretation Comments

 

             Anisocytosis (test code = 702-1) SLIGHT                            

      





[facility] HospitalRed Cell Morphology Uraqqan7998-56-36 
09:55:00* 



             Test Item    Value        Reference Range Interpretation Comments

 

             Red Cell Morphology Comment (test code = 6742-1) NORMAL            

                      





[facility] HospitalCreatine Kinase YK6858-91-18 09:22:00* 



             Test Item    Value        Reference Range Interpretation Comments

 

             Creatine Kinase MB (test code = 60024-8) 9.50         0-5.0        

H             





[facility] HospitalTroponin -85-57 09:22:00* 



             Test Item    Value        Reference Range Interpretation Comments

 

             Troponin I (test code = XWL2681) 0.054        0-0.300              

      





[facility] HospitalCreatine Htbgga0251-15-52 09:18:00* 



             Test Item    Value        Reference Range Interpretation Comments

 

             Creatine Kinase (test code = 2157-6) 497                 H   

          





[facility] HospitalArterial Blood rH9949-87-35 08:07:00* 



             Test Item    Value        Reference Range Interpretation Comments

 

             Arterial Blood pH (test code = 2744-1) 7.31         7.31-7.41      

            





[facility] HospitalArterial Blood Partial Pressure CO2
2018 08:07:00* 



             Test Item    Value        Reference Range Interpretation Comments

 

             Arterial Blood Partial Pressure CO2 (test code = 2019-8) 54        

   41-51        H             





[facility] HospitalArterial Blood Partial Pressure O2
2018 08:07:00* 



             Test Item    Value        Reference Range Interpretation Comments

 

             Arterial Blood Partial Pressure O2 (test code = 2019-8) 114        

         H             





[facility] HospitalArterial Blood KEP90039-27-41 08:07:00* 



             Test Item    Value        Reference Range Interpretation Comments

 

             Arterial Blood HCO3 (test code = 1960-4) 27           23-28        

              





[facility] HospitalArterial Blood Base Jianhf2904-65-59 
08:07:00* 



             Test Item    Value        Reference Range Interpretation Comments

 

             Arterial Blood Base Excess (test code = 1925-7) 1.0          -2-3  

                     





[facility] HospitalArterial Blood Oxygen Saturation
2018 08:07:00* 



             Test Item    Value        Reference Range Interpretation Comments

 

             Arterial Blood Oxygen Saturation (test code = 2708-6) 98.0         

95-98                      





[facility] HospitalFiO22018-12-14 08:07:00* 



             Test Item    Value        Reference Range Interpretation Comments

 

             FiO2 (test code = FiO2) 50                                      





PT ON BIPAP AT 14/6,R14,50%JKT6UADMethodist Southlake HospitalUrine WBC
2018 14:17:00* 



             Test Item    Value        Reference Range Interpretation Comments

 

             Urine WBC (test code = 5821-4) NONE         0-5                    

    





[facility] HospitalUrine NUF9988-56-21 14:17:00* 



             Test Item    Value        Reference Range Interpretation Comments

 

             Urine RBC (test code = 42901-7) 0-5          0-5                   

     





[facility] HospitalUrine Hincbfzt5412-33-60 14:17:00* 



             Test Item    Value        Reference Range Interpretation Comments

 

             Urine Bacteria (test code = 43855-5) MODERATE     NONE         H   

          





[facility] HospitalUrine Epithelial Llubh2217-43-74 14:17:00
  * 



             Test Item    Value        Reference Range Interpretation Comments

 

             Urine Epithelial Cells (test code = 81114-8) FEW          NONE     

                  





[facility] HospitalUrine Hyaline Xsanc0477-47-55 14:17:00* 



             Test Item    Value        Reference Range Interpretation Comments

 

             Urine Hyaline Casts (test code = 08599-2) 6-10         0-1         

 H             





[facility] HospitalUrine Myhft9703-24-91 14:07:00* 



             Test Item    Value        Reference Range Interpretation Comments

 

             Urine Color (test code = 5778-6) YELLOW       YELLOW               

      





[facility] HospitalUrine Qoqyoyx0240-23-60 14:07:00* 



             Test Item    Value        Reference Range Interpretation Comments

 

             Urine Clarity (test code = 07906-0) SL CLOUDY    CLEAR        H    

         





[facility] HospitalUrine Specific Vgvczrm6293-40-39 14:07:00
  * 



             Test Item    Value        Reference Range Interpretation Comments

 

             Urine Specific Gravity (test code = 5811-5) 1.010        1.010-1.02

5                





[facility] HospitalUrine aC4464-78-78 14:07:00* 



             Test Item    Value        Reference Range Interpretation Comments

 

             Urine pH (test code = 26531-6) 6            5-7                    

    





[facility] HospitalUrine Leukocyte Nugzydqf5743-16-09 
14:07:00* 



             Test Item    Value        Reference Range Interpretation Comments

 

             Urine Leukocyte Esterase (test code = 5799-2) NEGATIVE     NEGATIVE

                   





[facility] HospitalUrine Vczutzo5195-17-71 14:07:00* 



             Test Item    Value        Reference Range Interpretation Comments

 

             Urine Nitrite (test code = 94335-5) NEGATIVE     NEGATIVE          

         





[facility] HospitalUrine Tazjefh9829-70-43 14:07:00* 



             Test Item    Value        Reference Range Interpretation Comments

 

             Urine Protein (test code = 5804-0) NEGATIVE     NEGATIVE           

        





[facility] Glucose (UA)2018 14:07:00* 



             Test Item    Value        Reference Range Interpretation Comments

 

             Urine Glucose (UA) (test code = 2349-9) 2+           NEGATIVE     H

             





[facility] HospitalUrine Geipoue5771-71-22 14:07:00* 



             Test Item    Value        Reference Range Interpretation Comments

 

             Urine Ketones (test code = 03000-7) 1+           NEGATIVE     H    

         





[facility] Eoyqposqiquc4413-67-87 14:07:00* 



             Test Item    Value        Reference Range Interpretation Comments

 

             Urine Urobilinogen (test code = 71258-8) 0.2          0.2-1        

              





[facility] Mijsrdqqk2990-06-76 14:07:00* 



             Test Item    Value        Reference Range Interpretation Comments

 

             Urine Bilirubin (test code = 1978-6) NEGATIVE     NEGATIVE         

          





[facility] Tsiip8792-70-01 14:07:00* 



             Test Item    Value        Reference Range Interpretation Comments

 

             Urine Blood (test code = 23053-0) 1+           NEGATIVE     H      

       





[facility] -76-63 12:56:00            
                                                                         [place] [address]      Patient Name: ANDREW DONALDSON JR        
                          MR #: [id number]                     : 1945  
                                Age/Sex: 73/M  Acct #: [id number]             
                Req #: [id number]  [facility] Physician: EVETTE BONILLA MD               
                      Ordered by: MELANIE HERNANDEZ MD                           
Report #: [id number]        Location: [facility]                                  
Room/Bed: [place] [address]            ___________________________________________
________________________________________________________    Procedure: [id number]
CT/CT CHEST W  Exam Date: 18                            Exam Time: 1235   
                                          REPORT STATUS: Signed    EXAMINATION: 
CT of the chest with contrast, PE protocol.      TECHNIQUE:  Spiral CT images of
the chest were performed from the lung apices   through the level of the adrenal
glands after the IV administration of 100 cc   of Isovue-370. Thin section r
econstructions were obtained with special   concentration on the pulmonary arter
ies.      COMPARISON: Chest radiograph same day      CLINICAL HISTORY:Shortness 
of breath, concern for pulmonary embolus           DISCUSSION:      Vasculature:
The main pulmonary artery, right and left pulmonary arteries, and   their visua
lized lobar and segmental pulmonary arteries are patent, without   filling defec
t. The pulmonary outflow tract is of normal caliber. No ectasia or   aneurysmal 
dilatation of the thoracic aorta. Atherosclerotic calcification of   the thoraci
c aorta and great vessel origins, with great vessel origin   tortuosity. Native 
coronary artery calcifications.      Lungs:  Groundglass and reticular opacities
in the dependent lower lobes left   greater than right, compatible with subsegm
ental atelectasis. Scattered left   upper lobe groundglass opacities for example
on series 3 image 47. Linear   opacity medially within the right middle lobe and
in the inferior lingula,   compatible with subsegmental atelectasis.      Airw
ays:  Trachea, mainstem bronchi, and central lobar and segmental bronchi   are p
atent.      Pleura:  <There is no evidence of pleural effusion or pneumothorax.>
     Heart and mediastinum:  No hilar, mediastinal, or axillary lymphadenopathy.
  There is concentric wall thickening of the esophagus at the thoracic inlet,   
exemplified on series 2 image 23. Normal heart size.      Abdomen:  Visualized 
portions of the liver, gallbladder, spleen, pancreas,   adrenals are unremarkab
le. Left upper pole renal cyst partially visualized.      Bones and soft tissues
:  No focal soft tissue abnormalities. No osseous   destructive lesions.      IM
PRESSION:    No CT evidence of pulmonary embolus to the level of the segmental b
ranch   pulmonary arteries.      Groundglass opacities in the left upper and low
er lobes may indicate   atelectasis, aspiration pneumonitis, or atypical infecti
on in the appropriate   clinical setting.      Questionable esophageal wall thic
kening at the thoracic inlet may relate to   peristalsis. Correlate for symptoms
of dysphagia. Gastroenterology consultation   for potential upper endoscopy is 
suggested to exclude presence of a mass   lesion.      Atherosclerotic vascular 
disease.               Signed by: Dr. Xavi Kohler M.D. on 2018 1:07 PM 
      Dictated By: XAVI KOHLER MD  Electronically Signed By: XAVI KOHLER MD 
on 18 1307  Transcribed By: NGUYEN on 18 1307       COPY TO:   MELANIE TUTTLE MD        Band Neutrophils %2018 12:46:00* 



             Test Item    Value        Reference Range Interpretation Comments

 

             Band Neutrophils % (test code = 764-1) 1                           

            





[facility] HospitalPolychromasia2018-12-13 12:46:00* 



             Test Item    Value        Reference Range Interpretation Comments

 

             Polychromasia (test code = 07160-6) FEW                            

         





[facility] HospitalB-Type Natriuretic Oqvjmng9795-68-21 
11:55:00* 



             Test Item    Value        Reference Range Interpretation Comments

 

             B-Type Natriuretic Peptide (test code = 81086-5) 338.7        0-100

        H             





[facility] HospitalProthrombin Dkwo2327-71-10 11:53:00* 



             Test Item    Value        Reference Range Interpretation Comments

 

             Prothrombin Time (test code = 5902-2) 21.9         11.9-14.5    H  

           





[facility] HospitalProthromb Time International Ratio
2018 11:53:00* 



             Test Item    Value        Reference Range Interpretation Comments

 

             Prothromb Time International Ratio (test code = 6301-6) 1.76       

                             





Oral Anticoagulant Therapy INR Values:1. Low Intensity Therapy        1.5 - 2.02
. Moderate Intensity Therapy   2.0 - 3.03. High Intensity Therapy(1)    2.5 - 3.
54. High Intensity Therapy(2)    3.0 - 4.05. Panic Value INR              > 5.0
[facility] HospitalActivated Partial Thromboplast Time
2018 11:53:00* 



             Test Item    Value        Reference Range Interpretation Comments

 

             Activated Partial Thromboplast Time (test code = 23405-1) 30.9     

    23.8-35.5                  





[facility] Eruckwriv5991-48-28 11:52:00* 



             Test Item    Value        Reference Range Interpretation Comments

 

             Total Bilirubin (test code = 1975-2) 1.8          0.2-1.2      H   

          





[facility] HospitalAspartate Amino Transf (AST/SGOT)
2018 11:52:00* 



             Test Item    Value        Reference Range Interpretation Comments

 

                                        Aspartate Amino Transf (AST/SGOT) (test 

code = Aspartate Amino Transf 

(AST/SGOT))     45              5-34            H                





[facility] HospitalAlanine Aminotransferase (ALT/SGPT)
2018 11:52:00* 



             Test Item    Value        Reference Range Interpretation Comments

 

             Alanine Aminotransferase (ALT/SGPT) (test code = 1742-6) 60        

   0-55         H             





[facility] Wynxauh9231-85-97 11:52:00* 



             Test Item    Value        Reference Range Interpretation Comments

 

             Total Protein (test code = 2885-2) 7.4          6.5-8.1            

        





[facility] HospitalAlbumin2018-12-13 11:52:00* 



             Test Item    Value        Reference Range Interpretation Comments

 

             Albumin (test code = 1751-7) 2.6          3.5-5.0      L           

  





[facility] HospitalGlobulin2018-12-13 11:52:00* 



             Test Item    Value        Reference Range Interpretation Comments

 

             Globulin (test code = 21159-8) 4.8          2.3-3.5      H         

    





[facility] HospitalAlbumin/Globulin Ktmmk4027-94-36 11:52:00
  * 



             Test Item    Value        Reference Range Interpretation Comments

 

             Albumin/Globulin Ratio (test code = 1759-0) 0.5          0.8-2.0   

   L             





[facility] HospitalAlkaline Gnjumbyelsn2266-62-85 11:52:00* 



             Test Item    Value        Reference Range Interpretation Comments

 

             Alkaline Phosphatase (test code = 6768-6) 83                 

               





[facility] HospitalLactic Acid Quoyy7546-16-79 11:40:00* 



             Test Item    Value        Reference Range Interpretation Comments

 

             Lactic Acid Level (test code = Lactic Acid Level) 21.1         4.5-

19.8                 





NOTIFIED DIANNA HYDE 1140 on 18 by STEPHANIE ALFARO [facility] 11:40:00                        
                                                             [facility]                        [address] [address]      Patient Name: ANDREW DONALDSON JR                      
            MR #: [id number]                     : 1945                
                  Age/Sex: 73/M  Acct #: [id number]                           
  Req #: [id number]  Adm Physician:                                             
        Ordered by: ARASELI HYDE NP                            Report #: 
[id number]        Location: ER                                      Room/Bed:    
                __________________________________________
_________________________________________________________    Procedure: [id number]
0 DX/CHEST SINGLE (PORTABLE)  Exam Date:                             Exam Time: 
                                             REPORT STATUS: Signed    Examinati
on: Single AP view of the chest.      COMPARISON: 3/4/2018      INDICATION: Shor
t of breath           DISCUSSION:      Lungs are well-inflated. No consolidation
, pleural effusion, or pneumothorax.   Tortuosity of the thoracic aorta with oth
erwise normal cardiomediastinal   contour for portable, AP technique.      No ac
loni osseous abnormality.      IMPRESSION:       1.   No acute cardiopulmonary ab
normalities.      Signed by: Dr. Xavi Kohler M.D. on 2018 11:41 AM     
  Dictated By: XAVI KOHLER MD  Electronically Signed By: XAVI KOHLER MD on 
18 1141  Transcribed By: NGUYEN on 18 1141       COPY TO:   ARASELI HYDE NP        Bedside Fywzfvo9039-68-48 12:03:00* 



             Test Item    Value        Reference Range Interpretation Comments

 

             Bedside Glucose (test code = 66973-1) 160                 H  

           





Meter ID: [id number] [facility] HospitalUrine Culture
2018 08:06:00* 



             Test Item    Value        Reference Range Interpretation Comments

 

             Urine Culture (test code = 630-4) Organism: ESCHERICHIA COLI       

                     





[facility] HospitalUrine Suegmxj9836-18-39 08:06:00* 



             Test Item    Value        Reference Range Interpretation Comments

 

             Urine Culture (test code = 630-4) Organism: ESCHERICHIA COLI       

                     





[facility] HospitalB-Type Natriuretic Uhbshxs0211-38-75 
11:25:00* 



             Test Item    Value        Reference Range Interpretation Comments

 

             B-Type Natriuretic Peptide (test code = 73998-9) 115.9        0-100

        H             





[facility] HospitalD-Dimer Quantitative (PE/DVT)2018 
11:09:00* 



             Test Item    Value        Reference Range Interpretation Comments

 

             D-Dimer Quantitative (PE/DVT) (test code = 79761-1) 0.48         0.

00-0.45    H             





[facility] HospitalD-Dimer Quantitative (PE/DVT)2018 
11:09:00* 



             Test Item    Value        Reference Range Interpretation Comments

 

             D-Dimer Quantitative (PE/DVT) (test code = 65315-3) 0.48         0.

00-0.45    H             





[facility] Centerodium Cwdxo9075-57-78 06:58:00* 



             Test Item    Value        Reference Range Interpretation Comments

 

             Sodium Level (test code = 2951-2) 140          136-145             

       





[facility] HospitalPotassium Lrgso3336-34-84 06:58:00* 



             Test Item    Value        Reference Range Interpretation Comments

 

             Potassium Level (test code = 2823-3) 4.5          3.5-5.1          

          





[facility] HospitalChloride Wdqug3179-41-95 06:58:00* 



             Test Item    Value        Reference Range Interpretation Comments

 

             Chloride Level (test code = 2075-0) 105                      

         





[facility] HospitalCarbon Dioxide Uzxja4763-53-86 06:58:00* 



             Test Item    Value        Reference Range Interpretation Comments

 

             Carbon Dioxide Level (test code = 2028-9) 27           22-29       

               





[facility] HospitalAnion Jef0823-65-77 06:58:00* 



             Test Item    Value        Reference Range Interpretation Comments

 

             Anion Gap (test code = 33037-3) 12.5         8-16                  

     





[facility] HospitalBlood Urea Iyngmjoz0381-29-20 06:58:00* 



             Test Item    Value        Reference Range Interpretation Comments

 

             Blood Urea Nitrogen (test code = 3094-0) 27           7-26         

H             





[facility] HospitalCreatinine2018-03-03 06:58:00* 



             Test Item    Value        Reference Range Interpretation Comments

 

             Creatinine (test code = 2160-0) 0.92         0.72-1.25             

     





[facility] HospitalBUN/Creatinine Batgx0200-80-30 06:58:00* 



             Test Item    Value        Reference Range Interpretation Comments

 

             BUN/Creatinine Ratio (test code = 3097-3) 29           6-25        

 H             





[facility] HospitalEstimat Glomerular Filtration Rate
2018 06:58:00* 



             Test Item    Value        Reference Range Interpretation Comments

 

             Estimat Glomerular Filtration Rate (test code = 44294-6) 60-       

   >60                        





Ranges were taken from the National Kidney Disease Education Program and the [facility] Foundation literature.Reference ranges:60 or greater: Pbckkk02-70 (
for 3 consecutive months): Chronic kidney disease 15 or less: Kidney failureMethodist Southlake HospitalGlucose Xifdh9120-82-91 06:58:00* 



             Test Item    Value        Reference Range Interpretation Comments

 

             Glucose Level (test code = YYK8508) 159                 H    

         





[facility] HospitalCalcium Eptpn3804-32-59 06:58:00* 



             Test Item    Value        Reference Range Interpretation Comments

 

             Calcium Level (test code = 80920-1) 9.2          8.4-10.2          

         





[facility] HospitalWhite Blood Dryow3990-53-21 06:45:00* 



             Test Item    Value        Reference Range Interpretation Comments

 

             White Blood Count (test code = 6690-2) 9.34         4.8-10.8       

            





[facility] HospitalRed Blood Ktvoj2835-76-22 06:45:00* 



             Test Item    Value        Reference Range Interpretation Comments

 

             Red Blood Count (test code = 789-8) 4.64         4.3-5.7           

         





[facility] HospitalHemoglobin2018-03-03 06:45:00* 



             Test Item    Value        Reference Range Interpretation Comments

 

             Hemoglobin (test code = 34908-3) 14.7         14.0-18.0            

      





[facility] HospitalHematocrit2018-03-03 06:45:00* 



             Test Item    Value        Reference Range Interpretation Comments

 

             Hematocrit (test code = 4544-3) 45.3         38.2-49.6             

     





[facility] HospitalMean Corpuscular Bjueaf2898-22-49 
06:45:00* 



             Test Item    Value        Reference Range Interpretation Comments

 

             Mean Corpuscular Volume (test code = 787-2) 97.6         81-99     

                 





[facility] HospitalMean Corpuscular Coaczyujht7388-78-12 
06:45:00* 



             Test Item    Value        Reference Range Interpretation Comments

 

             Mean Corpuscular Hemoglobin (test code = 785-6) 31.7         28-32 

                     





[facility] HospitalMean Corpuscular Hemoglobin Concent
2018 06:45:00* 



             Test Item    Value        Reference Range Interpretation Comments

 

             Mean Corpuscular Hemoglobin Concent (test code = 786-4) 32.5       

  31-35                      





[facility] HospitalRed Cell Distribution Psqja7755-21-61 
06:45:00* 



             Test Item    Value        Reference Range Interpretation Comments

 

             Red Cell Distribution Width (test code = 95029-2) 13.8         11.7

-14.4                  





[facility] HospitalPlatelet Wonjl0211-30-71 06:45:00* 



             Test Item    Value        Reference Range Interpretation Comments

 

             Platelet Count (test code = 777-3) 237          140-360            

        





[facility] HospitalNeutrophils (%) (Auto)2018 06:45:00
  * 



             Test Item    Value        Reference Range Interpretation Comments

 

             Neutrophils (%) (Auto) (test code = 37005-4) 71.6         38.7-80.0

                  





[facility] HospitalLymphocytes (%) (Auto)2018 06:45:00
  * 



             Test Item    Value        Reference Range Interpretation Comments

 

             Lymphocytes (%) (Auto) (test code = 736-9) 16.2         18.0-39.1  

  L             





[facility] HospitalMonocytes (%) (Auto)2018 06:45:00* 



             Test Item    Value        Reference Range Interpretation Comments

 

             Monocytes (%) (Auto) (test code = 5905-5) 9.4          4.4-11.3    

               





[facility] HospitalEosinophils (%) (Auto)2018 06:45:00
  * 



             Test Item    Value        Reference Range Interpretation Comments

 

             Eosinophils (%) (Auto) (test code = 713-8) 2.0          0.0-6.0    

                





[facility] HospitalBasophils (%) (Auto)2018 06:45:00* 



             Test Item    Value        Reference Range Interpretation Comments

 

             Basophils (%) (Auto) (test code = 706-2) 0.3          0.0-1.0      

              





[facility] HospitalIM GRANULOCYTES %2018 06:45:00* 



             Test Item    Value        Reference Range Interpretation Comments

 

             IM GRANULOCYTES % (test code = IM GRANULOCYTES %) 0.5          0.0-

1.0                    





[facility] HospitalNeutrophils # (Auto)2018 06:45:00* 



             Test Item    Value        Reference Range Interpretation Comments

 

             Neutrophils # (Auto) (test code = 751-8) 6.7          2.1-6.9      

              





[facility] HospitalLymphocytes # (Auto)2018 06:45:00* 



             Test Item    Value        Reference Range Interpretation Comments

 

             Lymphocytes # (Auto) (test code = 61117-4) 1.5          1.0-3.2    

                





[facility] HospitalMonocytes # (Auto)2018 06:45:00* 



             Test Item    Value        Reference Range Interpretation Comments

 

             Monocytes # (Auto) (test code = 742-7) 0.9          0.2-0.8      H 

            





[facility] HospitalEosinophils # (Auto)2018 06:45:00* 



             Test Item    Value        Reference Range Interpretation Comments

 

             Eosinophils # (Auto) (test code = 711-2) 0.2          0.0-0.4      

              





[facility] HospitalBasophils # (Auto)2018 06:45:00* 



             Test Item    Value        Reference Range Interpretation Comments

 

             Basophils # (Auto) (test code = 704-7) 0.0          0.0-0.1        

            





[facility] HospitalAbsolute Immature Granulocyte (auto
2018 06:45:00* 



             Test Item    Value        Reference Range Interpretation Comments

 

                                        Absolute Immature Granulocyte (auto (mary

t code = Absolute Immature Granulocyte 

(auto)          0.05            0-0.1                            





[facility] HospitalFr Szvfffrjk1219-09-50 14:26:00* 



             Test Item    Value        Reference Range Interpretation Comments

 

             Free Thyroxine (test code = 3024-7) 0.99         0.9-1.8           

         





[facility] HospitalThyroid Stimulating Hormone (TSH)
2018 14:26:00* 



             Test Item    Value        Reference Range Interpretation Comments

 

             Thyroid Stimulating Hormone (TSH) (test code = 01123-8) 0.803      

  0.350-4.940                





[facility] HospitalFr Cukabndoh4092-66-66 14:26:00* 



             Test Item    Value        Reference Range Interpretation Comments

 

             Free Thyroxine (test code = 3024-7) 0.99         0.9-1.8           

         





[facility] HospitalHemoglobin A1c Ppzhzbm9171-63-50 14:07:00
  * 



             Test Item    Value        Reference Range Interpretation Comments

 

             Hemoglobin A1c Percent (test code = Hemoglobin A1c Percent) 6.9    

      4.0-7.0                    





[facility] HospitalCreatine Kinase QR2430-24-69 06:59:00* 



             Test Item    Value        Reference Range Interpretation Comments

 

             Creatine Kinase MB (test code = 82160-0) 4.30         0-5.0        

              





[facility] HospitalTroponin -41-33 06:59:00* 



             Test Item    Value        Reference Range Interpretation Comments

 

             Troponin I (test code = SWC9650) 0.047        0-0.300              

      





[facility] HospitalCreatine Yoluxa6405-71-87 06:48:00* 



             Test Item    Value        Reference Range Interpretation Comments

 

             Creatine Kinase (test code = 2157-6) 236                 H   

          





[facility] HospitalUrine FHB1942-24-31 05:47:00* 



             Test Item    Value        Reference Range Interpretation Comments

 

             Urine WBC (test code = 5821-4) 50-          0-5          H         

    





[facility] HospitalUrine HZV7857-45-67 05:47:00* 



             Test Item    Value        Reference Range Interpretation Comments

 

             Urine RBC (test code = 02915-7) 11-20        0-5          H        

     





[facility] HospitalUrine Sxmpumne1438-46-79 05:47:00* 



             Test Item    Value        Reference Range Interpretation Comments

 

             Urine Bacteria (test code = 59771-8) MODERATE     NONE         H   

          





[facility] HospitalUrine Epithelial Krasw6653-74-08 05:47:00
  * 



             Test Item    Value        Reference Range Interpretation Comments

 

             Urine Epithelial Cells (test code = 43481-7) RARE         NONE     

                  





[facility] HospitalUrine Lyvuj2644-00-99 05:34:00* 



             Test Item    Value        Reference Range Interpretation Comments

 

             Urine Color (test code = 5778-6) YELLOW       YELLOW               

      





[facility] HospitalUrine Rryzozp1084-50-03 05:34:00* 



             Test Item    Value        Reference Range Interpretation Comments

 

             Urine Clarity (test code = 91301-1) CLOUDY       CLEAR        H    

         





[facility] HospitalUrine Specific Gfjtnqx6968-76-15 05:34:00
  * 



             Test Item    Value        Reference Range Interpretation Comments

 

             Urine Specific Gravity (test code = 5811-5) 1.020        1.010-1.02

5                





[facility] HospitalUrine tP8627-57-28 05:34:00* 



             Test Item    Value        Reference Range Interpretation Comments

 

             Urine pH (test code = 37359-4) 5            5-7                    

    





[facility] HospitalUrine Leukocyte Zqxcpiyb9680-98-89 
05:34:00* 



             Test Item    Value        Reference Range Interpretation Comments

 

             Urine Leukocyte Esterase (test code = 5799-2) 2+           NEGATIVE

     H             





[facility] Bgtwovi7432-18-28 05:34:00* 



             Test Item    Value        Reference Range Interpretation Comments

 

             Urine Nitrite (test code = 43423-5) POSITIVE     NEGATIVE     H    

         





[facility] Hppidlk3335-28-50 05:34:00* 



             Test Item    Value        Reference Range Interpretation Comments

 

             Urine Protein (test code = 5804-0) 1+           NEGATIVE     H     

        





[facility] Glucose (UA)2018 05:34:00* 



             Test Item    Value        Reference Range Interpretation Comments

 

             Urine Glucose (UA) (test code = 2349-9) NEGATIVE     NEGATIVE      

             





[facility] Robtzah6972-69-25 05:34:00* 



             Test Item    Value        Reference Range Interpretation Comments

 

             Urine Ketones (test code = 26923-4) NEGATIVE     NEGATIVE          

         





[facility] Niebfuvcwlhk5736-13-21 05:34:00* 



             Test Item    Value        Reference Range Interpretation Comments

 

             Urine Urobilinogen (test code = 83984-9) 0.2          0.2-1        

              





[facility] Lnuhqkewp8006-87-64 05:34:00* 



             Test Item    Value        Reference Range Interpretation Comments

 

             Urine Bilirubin (test code = 1978-6) NEGATIVE     NEGATIVE         

          





[facility] Zmzol7791-37-29 05:34:00* 



             Test Item    Value        Reference Range Interpretation Comments

 

             Urine Blood (test code = 74460-9) 4+           NEGATIVE     H      

       





[facility] Occult Uzibs6964-77-09 13:16:00* 



             Test Item    Value        Reference Range Interpretation Comments

 

             Stool Occult Blood (test code = 2335-8) NEGATIVE     NEGATIVE      

             





[facility] Occult Wnykx3994-96-57 13:16:00* 



             Test Item    Value        Reference Range Interpretation Comments

 

             Stool Occult Blood (test code = 2335-8) NEGATIVE     NEGATIVE      

             





[facility]flu[facility] Virus Types A,B Antigen
2018 12:42:00* 



             Test Item    Value        Reference Range Interpretation Comments

 

             Influenza Virus Types A,B Antigen (test code = 46672-5) NEGATIVE   

  NEGATIVE                   





[facility] Virus Types A,B Antigen
2018 12:42:00* 



             Test Item    Value        Reference Range Interpretation Comments

 

             Influenza Virus Types A,B Antigen (test code = 97547-7) NEGATIVE   

  NEGATIVE                   





[facility] Ilvvo3393-96-48 12:16:00* 



             Test Item    Value        Reference Range Interpretation Comments

 

             Magnesium Level (test code = 45932-0) 1.9          1.3-2.1         

           





[facility] HospitalTotal Ivhwvkkgn0471-47-62 12:16:00* 



             Test Item    Value        Reference Range Interpretation Comments

 

             Total Bilirubin (test code = 1975-2) 0.9          0.2-1.2          

          





[facility] HospitalAspartate Amino Transf (AST/SGOT)
2018 12:16:00* 



             Test Item    Value        Reference Range Interpretation Comments

 

                                        Aspartate Amino Transf (AST/SGOT) (test 

code = Aspartate Amino Transf 

(AST/SGOT))     24              5-34                             





[facility] HospitalAlanine Aminotransferase (ALT/SGPT)
2018 12:16:00* 



             Test Item    Value        Reference Range Interpretation Comments

 

             Alanine Aminotransferase (ALT/SGPT) (test code = 1742-6) 27        

   0-55                       





[facility] Zmivihs4577-47-70 12:16:00* 



             Test Item    Value        Reference Range Interpretation Comments

 

             Total Protein (test code = 2885-2) 7.4          6.5-8.1            

        





[facility] HospitalAlbumin2018-03-01 12:16:00* 



             Test Item    Value        Reference Range Interpretation Comments

 

             Albumin (test code = 1751-7) 3.8          3.5-5.0                  

  





[facility] HospitalGlobulin2018-03-01 12:16:00* 



             Test Item    Value        Reference Range Interpretation Comments

 

             Globulin (test code = 93705-2) 3.6          2.3-3.5      H         

    





[facility] HospitalAlbumin/Globulin Oeoxt8576-97-58 12:16:00
  * 



             Test Item    Value        Reference Range Interpretation Comments

 

             Albumin/Globulin Ratio (test code = 1759-0) 1.1          0.8-2.0   

                 





[facility] HospitalAlkaline Cggaacrhjdh6926-84-60 12:16:00* 



             Test Item    Value        Reference Range Interpretation Comments

 

             Alkaline Phosphatase (test code = 6768-6) 46                 

               





[facility] HospitalProthrombin Oijy0387-53-26 12:07:00* 



             Test Item    Value        Reference Range Interpretation Comments

 

             Prothrombin Time (test code = 5902-2) 13.3         11.9-14.5       

           





[facility] HospitalProthromb Time International Ratio
2018 12:07:00* 



             Test Item    Value        Reference Range Interpretation Comments

 

             Prothromb Time International Ratio (test code = 6301-6) 1.09       

                             





Oral Anticoagulant Therapy INR Values:1. Low Intensity Therapy        1.5 - 2.02
. Moderate Intensity Therapy   2.0 - 3.03. High Intensity Therapy(1)    2.5 - 3.
54. High Intensity Therapy(2)    3.0 - 4.05. Panic Value INR              > 5.0
[facility] HospitalActivated Partial Thromboplast Time
2018 12:07:00* 



             Test Item    Value        Reference Range Interpretation Comments

 

             Activated Partial Thromboplast Time (test code = 85701-9) 24.9     

    23.8-35.5                  





[facility]    [facility]   [address] [place] [address]      
Patient Name: ANDREW DONALDSON JR   MR #: [id number]    : 1945 Age/Sex: 
72/M  Acct #: [id number] Req #: [id number]  Adm Physician: AMY COATES MD 
  Ordered by: XAVI FRANKLIN MD  Report #: [id number]   Location: [facility]  Room/Bed: 
[place] [address]    ____________________________________________
_______________________________________________________    Procedure: [id number] 
DX/CHEST 2 VIEWS  Exam Date: 18                            Exam Time: 0730
      REPORT STATUS: Signed    EXAM: Single AP view of the chest (Portable).    
 COMPARISON: Chest radiograph from 2018      INDICATION: Hypoxia      FI
NDINGS: Single portable AP view of the chest. The visualized bones and soft   ti
ssues, cardiac silhouette ,  pleura appear unchanged.      IMPRESSION:       1. 
Lines/tubes: None   2.  Worsening bibasilar airspace opacity with mild volume lo
ss due to worsening   atelectasis or interval aspiration.      Signed by: Dr. Dilia Mariee M.D. on 3/4/2018 7:55 AM        Dictated By: BEN MARIEE MD  Electronically Signed By: BEN MARIEE MD on 18  Transcribed By: NGUYEN on 18       COPY TO:   XAVI FRANKLIN MD  
     MRI BRAIN WO    [place] [address]      Patient Name: ANDREW DONALDSON JR   MR #:
[id number]    : 1945 Age/Sex: 72/M  Acct #: [id number] Req #: [id number]
[id number]  Adm Physician: AMY COATES MD    Ordered by: AMY COATES MD  
Report #: [id number]   Location: [facility]  Room/Bed: [place] [address]    
________________________________________
___________________________________________________________    Procedure: 0302-0
005 MRI/MRI BRAIN WO  Exam Date:                             Exam Time:        R
EPORT STATUS: Signed    Examination: MRI BRAIN WITHOUT CONTRAST   History: Dizzi
ness. Imbalance. Near syncope.   Comparison studies: None      Technique:    Sag
ittal T2; axial DWI, FLAIR, GRE or SWI, T1, Coronal FLAIR.   Intravenous contras
t: None      Findings:      Scalp: No abnormal signal.  No masses.   Bone marrow
: Normal in signal intensity.      Brain volume: Mild volume loss volume loss.  
Ventricles: Normal in size and configuration.   No hydrocephalus.        Extra-
axial spaces:   No abnormalities.      Parenchyma:   There are patchy and conflu
ent areas of T2/FLAIR hyperintensity in the   periventricular and subcortical wh
ite matter, nonspecific.    Chronic lacunar infarct (7.5 mm anteroposterior dime
nsion) in the left lateral   putamen.   No masses, hemorrhage, or acute vascular
insults.      Suprasellar and sellar region: No abnormalities.   Craniocervical 
junction: No abnormalities. The foramen magnum is patent. No   Chiari malformat
ions.   Vessels: Normal flow-voids in the arteries and sinuses.      Additional 
findings:Bilateral slitlike orbital lenses.      IMPRESSION:      1.  No acute i
ntracranial abnormalities.      2.  Mild volume loss and microvascular ischemic 
change.      3.  Chronic lacunar infarct of the left putamen.      Signed by: Dr Jack Boyd M.D. on 3/2/2018 1:07 PM        Dictated By: CHUY COLBY MD  Electronically Signed By: CHUY JALLOH MD on 18 
1307  Transcribed By: NGUYEN on 18 1307       COPY TO:   AMY OCATES MD        CAROTID DOPPLER [place] [address]    Patient Name : ANDREW DONALDSON JR
        MR #: [id number]   : 1945         Age/Sex: 72/M      Acct # : 
[id number]           Adm Physician  : AMY COATES MD       Admit Date  :  
18       Location : [facility]    Room/Bed : [place] [address]          
____________________________________________
_______________________________________________________     REPORT: Cardiology R
[place]       DATE OF STUDY:  2018       DOPPLER SCAN OF CAROTIDS       
Left carotid artery shows velocity 1.4 meters per second in the left distal    
internal carotid artery.  Left vertebral flow appears antegrade.      Right farah
tid artery shows a stent in the mid right internal carotid artery    with veloci
ty 1.8 meters per second consistent with mild stenosis.  Right    vertebral flow
appears to be antegrade.      CONCLUSIONS    1. A stent is present in the right 
mid internal carotid artery with    velocity of 1.84 meters per second consiste
nt with mild to moderate    stenosis in the range of 50% to 70%.   2. Mild steno
sis involving the left distal internal carotid artery with    velocity of 1.4 me
ters per second.  The degree of stenosis is once again    in the range of 50% to
70%.   3. Borderline elevated velocity in the right external carotid artery    
with velocity of 1.3 meters per second.   4. Vertebral flow appears to be in nor
mal direction bilaterally.              DD:  2018 14:07   DT:  2018 
15:49   Job#:  [id number]       cc:   AMY COATES M.D.           Signature  
                                                                Date            
              Dictated By: FARIBA IZAGUIRRE MD  Transcribed By: EDS on 18   
<Electronically signed by FARIBA IZAGUIRRE MD><<Signature on File>>18 1057    
COPY TO:     CHEST SINGLE (PORTABLE)    [place] [address]      Patient Name: ANDREW DONALDSON JR   MR #: [id number]    : 1945 Age/Sex: 72/M  Acct #: 
[id number] Req #: [id number]  Adm Physician: AMY COATES MD    Ordered by:
AMY COATES MD  Report #: [id number]   Location: [facility]  Room/Bed: [place] [address] 
  ________________________________________
___________________________________________________________    Procedure: 0302-0
022 DX/CHEST SINGLE (PORTABLE)  Exam Date: 18                            E
xam Time: 1000       REPORT STATUS: Signed    PROCEDURE: CHEST SINGLE (PORTABLE)
  COMPARISON: [facility], DX, CHEST SINGLE (PORTABLE),    3/01/201
8, 11:58.   INDICATIONS: SHORTNESS OF BREATH       FINDINGS:         LUNGS: Exam
is characterized by a poor inspiration. There is right    basilar subsegmental 
atelectasis. No consolidation.       PLEURA: No effusions or pneumothorax.      
HEART  T     MEDIASTINUM: The heart is within normal size-limits.       BONES  T
   SOFT TISSUES:  No acute findings.           CONCLUSION:      Right basilar 
subsegmental atelectasis.            Chon Jeong D.O.     Dictated by:  Chon Jeong D.O. on 3/02/2018 at 14:21        Electronically approved by:  Chon reina D.O. on 3/02/2018 at 14:21                   Dictated By: CHON JEONG DO  Electronically Signed By: CHON JEONG DO on 18 1422  Transcribed By:
LINDSAY on 18 1422       COPY TO:   AMY COATES MD        CHEST SINGLE 
(PORTABLE)    [place] [address]      Patient Name: ANDREW DONALDSON JR   MR #:
[id number]    : 1945 Age/Sex: 72/M  Acct #: [id number] Req #: [id number]
[id number]  Adm Physician:     Ordered by: ANDREW BARRAGAN NP  Report #: [id number]
  Location: ER  Room/Bed:     
______________________________________________________________________
_____________________________    Procedure: [id number] DX/CHEST SINGLE (PORTABLE)
 Exam Date: 18                            Exam Time: 1155       REPORT ST
ATUS: Signed    PROCEDURE:   CHEST SINGLE (PORTABLE)   TECHNIQUE:   Portable AP 
chest   INDICATION:   Low blood pressure   COMPARISON:   None.       FINDINGS:  
Lungs are clear and symmetrically inflated. No pleural effusions.    Normal hea
rt size, mediastinal contour, and pulmonary vasculature for    technique. Mild a
ortic arch calcification. Grossly intact skeleton.           CONCLUSION:   No ac
loni abnormality.               Dictated by:  Gianluca Montilla M.D. on 3/01/20
18 at 12:25        Electronically approved by:  Gianluca Montilla M.D. on 3/01
/2018 at    12:25                Dictated By: GIANLUCA MONTILLA MD  Electronically 
Signed By: GIANLUCA MONTILLA MD on 18 1226  Transcribed By: LINDSAY on 18
1226       COPY TO:   ANDREW BARRAGAN NP

## 2020-11-28 NOTE — DIAGNOSTIC IMAGING REPORT
EXAM: CT of the  site without contrast



INDICATION: Status post fall with left lower extremity pain and difficulty

bearing weight.



COMPARISON: None available.



TECHNIQUE: Multidetector CT scanning of the site  was performed. Coronal and

sagittal multiplanar reformations were obtained.



RADIATION DOSE: 

     Total DLP:     mGy*cm

     Estimated effective dose: (DLP x 0.014 x size factor) mSv

     CTDIvol has been reviewed. It is below the limits set by the Radiation

Protocol Committee (RPC).

     Dose modulation, iterative reconstruction, and/or weight based adjustment

of the mA/kV was utilized to reduce the radiation dose to as low as reasonably

achievable. 





FINDINGS: 



Bones: No acute displaced fracture identified.



Joints: There are mild to moderate degenerative changes of the left hip

characterized by joint space narrowing and marginal osteophytes. There are

severe tricompartmental degenerative changes of the left knee characterized by

severe joint space narrowing, subchondral cystic changes, subchondral sclerosis

and marginal/fragmented osteophytes.



Soft Tissues: There is ill-defined mild prominence of the mid deep quadriceps

muscles suggestive of intramuscular hematoma. No evidence of active

extravasation. There is moderate to severe atherosclerotic calcification of the

lower extremity vasculature.



Others: The partially imaged pelvis is normal.



IMPRESSION:

1.  Ill-defined mild prominence of the mid deep quadriceps muscles suggestive

of intramuscular hematoma. No evidence of active extravasation.

2.  No acute fracture or evidence of dislocation.

3.  Severe tricompartmental osteoarthritis of the left knee.



Signed by: Leatha Gonzalez MD on 11/28/2020 3:51 PM

## 2020-11-28 NOTE — DIAGNOSTIC IMAGING REPORT
EXAMINATION:  CHEST SINGLE (PORTABLE)    



INDICATION:      

^FALL 9 D AGO

^[id number]

^1415



COMPARISON:  Multiple prior chest radiograph including most recent on

12/20/2018. Chest CT on 12/13/2018.

     

FINDINGS:    



TUBES and LINES:  None.



LUNGS:  Low lung volumes with bronchovascular crowding. There is diffuse patchy

airspace opacities and interstitial prominence bilaterally.



PLEURA:  No pleural effusion or pneumothorax. Left pleural scarring, unchanged.



HEART AND MEDIASTINUM:  The cardiomediastinal silhouette is unremarkable. There

are atherosclerotic calcifications within the aorta.



BONES AND SOFT TISSUES:  No acute osseous lesion.  Soft tissues are

unremarkable.



UPPER ABDOMEN: No free air under the diaphragm.    



IMPRESSION: 



Diffuse interstitial and patchy airspace opacities which may represent

combination of bronchovascular crowding and subsegmental atelectasis. However,

multifocal pneumonia can have a similar appearance and should be considered in

the proper clinical context.





Signed by: Leatha Gonzalez MD on 11/28/2020 3:41 PM

## 2020-11-29 VITALS — SYSTOLIC BLOOD PRESSURE: 130 MMHG | DIASTOLIC BLOOD PRESSURE: 70 MMHG

## 2020-11-29 VITALS — SYSTOLIC BLOOD PRESSURE: 162 MMHG | DIASTOLIC BLOOD PRESSURE: 69 MMHG

## 2020-11-29 VITALS — SYSTOLIC BLOOD PRESSURE: 133 MMHG | DIASTOLIC BLOOD PRESSURE: 73 MMHG

## 2020-11-29 VITALS — DIASTOLIC BLOOD PRESSURE: 60 MMHG | SYSTOLIC BLOOD PRESSURE: 135 MMHG

## 2020-11-29 VITALS — DIASTOLIC BLOOD PRESSURE: 62 MMHG | SYSTOLIC BLOOD PRESSURE: 120 MMHG

## 2020-11-29 VITALS — DIASTOLIC BLOOD PRESSURE: 71 MMHG | SYSTOLIC BLOOD PRESSURE: 141 MMHG

## 2020-11-29 VITALS — SYSTOLIC BLOOD PRESSURE: 141 MMHG | DIASTOLIC BLOOD PRESSURE: 70 MMHG

## 2020-11-29 VITALS — DIASTOLIC BLOOD PRESSURE: 71 MMHG | SYSTOLIC BLOOD PRESSURE: 127 MMHG

## 2020-11-29 LAB
ALBUMIN SERPL-MCNC: 3.1 G/DL (ref 3.5–5)
ALBUMIN/GLOB SERPL: 1 {RATIO} (ref 0.8–2)
ALP SERPL-CCNC: 59 IU/L (ref 40–150)
ALT SERPL-CCNC: 35 IU/L (ref 0–55)
ANION GAP SERPL CALC-SCNC: 12.2 MMOL/L (ref 8–16)
BASOPHILS # BLD AUTO: 0 10*3/UL (ref 0–0.1)
BASOPHILS NFR BLD AUTO: 0.3 % (ref 0–1)
BUN SERPL-MCNC: 27 MG/DL (ref 7–26)
BUN/CREAT SERPL: 29 (ref 6–25)
CALCIUM SERPL-MCNC: 8.8 MG/DL (ref 8.4–10.2)
CHLORIDE SERPL-SCNC: 106 MMOL/L (ref 98–107)
CHOLEST SERPL-MCNC: 94 MD/DL (ref 0–199)
CHOLEST/HDLC SERPL: 3.5 {RATIO} (ref 3.9–4.7)
CK MB SERPL-MCNC: 5.7 NG/ML (ref 0–5)
CK MB SERPL-MCNC: 6 NG/ML (ref 0–5)
CK SERPL-CCNC: 219 IU/L (ref 30–200)
CK SERPL-CCNC: 243 IU/L (ref 30–200)
CO2 SERPL-SCNC: 28 MMOL/L (ref 22–29)
DEPRECATED NEUTROPHILS # BLD AUTO: 7.2 10*3/UL (ref 2.1–6.9)
DEPRECATED PHOSPHATE SERPL-MCNC: 3.3 MG/DL (ref 2.3–4.7)
EGFRCR SERPLBLD CKD-EPI 2021: > 60 ML/MIN (ref 60–?)
EOSINOPHIL # BLD AUTO: 0.1 10*3/UL (ref 0–0.4)
EOSINOPHIL NFR BLD AUTO: 1.1 % (ref 0–6)
ERYTHROCYTE [DISTWIDTH] IN CORD BLOOD: 16.4 % (ref 11.7–14.4)
GLOBULIN PLAS-MCNC: 3 G/DL (ref 2.3–3.5)
GLUCOSE SERPLBLD-MCNC: 176 MG/DL (ref 74–118)
HCT VFR BLD AUTO: 42.6 % (ref 38.2–49.6)
HDLC SERPL-MSCNC: 27 MG/DL (ref 40–60)
HGB BLD-MCNC: 13.6 G/DL (ref 14–18)
LDLC SERPL CALC-MCNC: 52 MG/DL (ref 60–130)
LYMPHOCYTES # BLD: 1.1 10*3/UL (ref 1–3.2)
LYMPHOCYTES NFR BLD AUTO: 11.6 % (ref 18–39.1)
MAGNESIUM SERPL-MCNC: 2.2 MG/DL (ref 1.3–2.1)
MCH RBC QN AUTO: 33.1 PG (ref 28–32)
MCHC RBC AUTO-ENTMCNC: 31.9 G/DL (ref 31–35)
MCV RBC AUTO: 103.6 FL (ref 81–99)
MONOCYTES # BLD AUTO: 1 10*3/UL (ref 0.2–0.8)
MONOCYTES NFR BLD AUTO: 10.7 % (ref 4.4–11.3)
NEUTS SEG NFR BLD AUTO: 75.6 % (ref 38.7–80)
PLATELET # BLD AUTO: 305 X10E3/UL (ref 140–360)
POTASSIUM SERPL-SCNC: 4.2 MMOL/L (ref 3.5–5.1)
RBC # BLD AUTO: 4.11 X10E6/UL (ref 4.3–5.7)
SODIUM SERPL-SCNC: 142 MMOL/L (ref 136–145)
TRIGL SERPL-MCNC: 77 MG/DL (ref 0–149)
TSH SERPL DL<=0.005 MIU/L-ACNC: 2.17 UIU/ML (ref 0.35–4.94)

## 2020-11-29 RX ADMIN — TAMSULOSIN HYDROCHLORIDE SCH MG: 0.4 CAPSULE ORAL at 08:20

## 2020-11-29 RX ADMIN — LOSARTAN POTASSIUM SCH MG: 25 TABLET, FILM COATED ORAL at 08:20

## 2020-11-29 RX ADMIN — METFORMIN HYDROCHLORIDE SCH MG: 500 TABLET, FILM COATED ORAL at 17:12

## 2020-11-29 RX ADMIN — SIMVASTATIN SCH MG: 20 TABLET, FILM COATED ORAL at 21:00

## 2020-11-29 RX ADMIN — APIXABAN SCH MG: 5 TABLET, FILM COATED ORAL at 17:12

## 2020-11-29 RX ADMIN — DOCUSATE SODIUM SCH MG: 100 TABLET, FILM COATED ORAL at 17:12

## 2020-11-29 RX ADMIN — INSULIN HUMAN SCH UNIT: 100 INJECTION, SOLUTION PARENTERAL at 12:07

## 2020-11-29 RX ADMIN — METFORMIN HYDROCHLORIDE SCH MG: 500 TABLET, FILM COATED ORAL at 08:20

## 2020-11-29 RX ADMIN — APIXABAN SCH MG: 5 TABLET, FILM COATED ORAL at 10:34

## 2020-11-29 RX ADMIN — PANTOPRAZOLE SODIUM SCH MG: 40 TABLET, DELAYED RELEASE ORAL at 08:08

## 2020-11-29 RX ADMIN — METOPROLOL SUCCINATE SCH MG: 50 TABLET, EXTENDED RELEASE ORAL at 08:20

## 2020-11-29 RX ADMIN — FUROSEMIDE SCH MG: 40 TABLET ORAL at 08:20

## 2020-11-29 RX ADMIN — INSULIN HUMAN SCH UNIT: 100 INJECTION, SOLUTION PARENTERAL at 21:00

## 2020-11-29 RX ADMIN — INSULIN HUMAN SCH UNIT: 100 INJECTION, SOLUTION PARENTERAL at 08:21

## 2020-11-29 RX ADMIN — DOCUSATE SODIUM SCH MG: 100 TABLET, FILM COATED ORAL at 08:19

## 2020-11-29 RX ADMIN — INSULIN HUMAN SCH UNIT: 100 INJECTION, SOLUTION PARENTERAL at 17:00

## 2020-11-29 NOTE — NUR
GAVE BEDSIDE SHIFT REPORT TO ONCOMING NURSE. CALL LIGHT WITHIN REACH. PATIENT IN BED. HOURLY 
ROUND ONE

## 2020-11-29 NOTE — CONSULTATION
DATE OF CONSULTATION:  11/29/2020  

 

HISTORY OF PRESENT ILLNESS:  The patient is a 75-year-old male, history of atrial

fibrillation, who is on Plavix and Eliquis, who fell about 10 days ago.  He developed

swelling in the left leg.  He says he has some pain in the left leg, but he is able to

walk on it.  He was evaluated in the emergency room and there was found to be bleeding

into the muscle of the quadriceps on the left, but no discrete hematoma. 

 

PAST MEDICAL HISTORY:  Significant for atrial fibrillation on anticoagulation,

congestive heart failure, hypertension, hyperlipidemia, coronary artery disease, carotid

artery disease, and type 2 diabetes. 

 

PAST SURGICAL HISTORY:  Previous surgery includes bilateral carotid stents, coronary

artery disease, stents, tonsillectomy, cataract surgery, and previous bilateral knee

surgery. 

 

MEDICATIONS:  Listed in the chart.

 

ALLERGIES:  HE HAS NO KNOWN ALLERGIES.

 

FAMILY HISTORY:  Noncontributory.

 

SOCIAL HISTORY:  The patient lives alone.  He does not smoke cigarettes, drink alcohol,

or use any other drugs. 

 

REVIEW OF SYSTEMS:

He has not had any fever.

 

PHYSICAL EXAMINATION:

GENERAL:  The patient is awake and alert. 

VITAL SIGNS:  Normal.  He is afebrile. 

HEENT:  Sclerae are not icteric. 

NECK:  Had no masses. 

LUNGS:  Equal breath sounds, clear bilaterally. 

CARDIAC:  Regular rhythm. 

ABDOMEN:  Soft without tenderness. 

EXTREMITIES:  Left leg, there is some ecchymosis and some swelling of the left leg.

There is no discrete hematoma is palpable.  There is mild tenderness, but no crepitus.

The left foot is warm.  Peripheral pulses not definitely palpable. 

ASSESSMENT:  A 75-year-old male status post fall with intramuscular bleeding in the left

leg and some diffuse ecchymosis.  There is no discrete hematoma on imaging or on exam

that would benefit from surgical intervention.  At this point, I recommend keep the

patient elevating his leg as much as possible.  Most likely the swelling will resolve

over time. 

 

Thank you for asking me to see Mr. Bryant.

 

 

 

 

______________________________

MD SHERLYN Montalvo/SAJAN

D:  11/29/2020 06:32:46

T:  11/29/2020 09:36:56

Job #:  [id number]

## 2020-11-29 NOTE — NUR
DAVON BREEN HAS SEEN TH EPT AND RENEWED  HOME MEDICATION ,PT RESTING .CALL LIGHT WITH IN REACH 
,CONTINUE TO MONITOR

## 2020-11-29 NOTE — NUR
RECEIVED PT IN RESTROOM .RESPIRATIONS ARE EVEN AND UNLABORED ,DR MCLAUGHLIN CALLED AND TOLD TO 
DC RICKI CALL LIGHT WITH IN REACH ,CONTINUE TO MONITOR

## 2020-11-30 VITALS — DIASTOLIC BLOOD PRESSURE: 68 MMHG | SYSTOLIC BLOOD PRESSURE: 129 MMHG

## 2020-11-30 VITALS — SYSTOLIC BLOOD PRESSURE: 100 MMHG | DIASTOLIC BLOOD PRESSURE: 82 MMHG

## 2020-11-30 VITALS — DIASTOLIC BLOOD PRESSURE: 75 MMHG | SYSTOLIC BLOOD PRESSURE: 133 MMHG

## 2020-11-30 VITALS — DIASTOLIC BLOOD PRESSURE: 84 MMHG | SYSTOLIC BLOOD PRESSURE: 113 MMHG

## 2020-11-30 VITALS — SYSTOLIC BLOOD PRESSURE: 133 MMHG | DIASTOLIC BLOOD PRESSURE: 62 MMHG

## 2020-11-30 LAB
ANION GAP SERPL CALC-SCNC: 10.2 MMOL/L (ref 8–16)
BASOPHILS # BLD AUTO: 0 10*3/UL (ref 0–0.1)
BASOPHILS NFR BLD AUTO: 0.4 % (ref 0–1)
BUN SERPL-MCNC: 26 MG/DL (ref 7–26)
BUN/CREAT SERPL: 27 (ref 6–25)
CALCIUM SERPL-MCNC: 8.1 MG/DL (ref 8.4–10.2)
CHLORIDE SERPL-SCNC: 106 MMOL/L (ref 98–107)
CO2 SERPL-SCNC: 31 MMOL/L (ref 22–29)
DEPRECATED NEUTROPHILS # BLD AUTO: 6.4 10*3/UL (ref 2.1–6.9)
EGFRCR SERPLBLD CKD-EPI 2021: > 60 ML/MIN (ref 60–?)
EOSINOPHIL # BLD AUTO: 0.2 10*3/UL (ref 0–0.4)
EOSINOPHIL NFR BLD AUTO: 1.8 % (ref 0–6)
ERYTHROCYTE [DISTWIDTH] IN CORD BLOOD: 16.3 % (ref 11.7–14.4)
GLUCOSE SERPLBLD-MCNC: 140 MG/DL (ref 74–118)
HCT VFR BLD AUTO: 40.2 % (ref 38.2–49.6)
HGB BLD-MCNC: 12.8 G/DL (ref 14–18)
LYMPHOCYTES # BLD: 1.3 10*3/UL (ref 1–3.2)
LYMPHOCYTES NFR BLD AUTO: 14.3 % (ref 18–39.1)
MCH RBC QN AUTO: 33 PG (ref 28–32)
MCHC RBC AUTO-ENTMCNC: 31.8 G/DL (ref 31–35)
MCV RBC AUTO: 103.6 FL (ref 81–99)
MONOCYTES # BLD AUTO: 1.1 10*3/UL (ref 0.2–0.8)
MONOCYTES NFR BLD AUTO: 11.9 % (ref 4.4–11.3)
NEUTS SEG NFR BLD AUTO: 70.9 % (ref 38.7–80)
PLATELET # BLD AUTO: 294 X10E3/UL (ref 140–360)
POTASSIUM SERPL-SCNC: 4.2 MMOL/L (ref 3.5–5.1)
RBC # BLD AUTO: 3.88 X10E6/UL (ref 4.3–5.7)
SODIUM SERPL-SCNC: 143 MMOL/L (ref 136–145)

## 2020-11-30 RX ADMIN — TAMSULOSIN HYDROCHLORIDE SCH MG: 0.4 CAPSULE ORAL at 10:49

## 2020-11-30 RX ADMIN — INSULIN HUMAN SCH UNIT: 100 INJECTION, SOLUTION PARENTERAL at 08:08

## 2020-11-30 RX ADMIN — LOSARTAN POTASSIUM SCH MG: 25 TABLET, FILM COATED ORAL at 10:48

## 2020-11-30 RX ADMIN — FUROSEMIDE SCH MG: 40 TABLET ORAL at 10:49

## 2020-11-30 RX ADMIN — DOCUSATE SODIUM SCH MG: 100 TABLET, FILM COATED ORAL at 10:47

## 2020-11-30 RX ADMIN — INSULIN HUMAN SCH UNIT: 100 INJECTION, SOLUTION PARENTERAL at 11:30

## 2020-11-30 RX ADMIN — METOPROLOL SUCCINATE SCH MG: 50 TABLET, EXTENDED RELEASE ORAL at 10:50

## 2020-11-30 RX ADMIN — PANTOPRAZOLE SODIUM SCH MG: 40 TABLET, DELAYED RELEASE ORAL at 10:47

## 2020-11-30 RX ADMIN — METFORMIN HYDROCHLORIDE SCH MG: 500 TABLET, FILM COATED ORAL at 10:49

## 2020-11-30 NOTE — CONSULTATION
DATE OF CONSULTATION:  11/30/2020

 

Cardiology Consultation

 

Thank you so much for asking me to see this nice man again in consultation. 

 

Mr. Bryant is an elderly 75-year-old man, who presented to the emergency room on the 29th

with a complaint of swelling of the left leg. 

 

HISTORY OF PRESENT ILLNESS:  The patient reports that on the Thursday before

Thanksgiving, which was November 19th that he was walking in the grocery store, pushing

an almost empty grocery cart.  He became short of breath and weak and fell to the floor.

 He struck his left knee and his left forehead on the floor.  The patient spoke to his

sister later, who thought he might have fainted, even visited with his family doctor

later on, who had no particular suggestions apparently and now comes to the emergency

room with his left knee continues to hurt. 

 

PAST MEDICAL HISTORY:  Long and complex with previous diagnoses of atrial fibrillation,

coronary artery disease.  He has had coronary stent in 2003 in [facility] by

Dr. Francis.  He reports carotid stenting.  He had a left knee surgery in 1970s.  He

has had bilateral cataract surgeries.  Uses eye drops.  Takes metformin for type 2 adult

onset diabetes and simvastatin for hyperlipidemia.  He is on Novolin and regular

insulin. 

 

PERSONAL AND SOCIAL HISTORY:  He does not smoke or drink.  He lives alone.

 

REVIEW OF SYSTEMS:

CARDIAC:  He admits ankle swelling and takes furosemide only once a day.  He has had no

chest pain or palpitations. 

 

PHYSICAL EXAMINATION:

GENERAL:  At this time shows a pleasant, alert man. 

VITAL SIGNS:  Blood pressure 113/84.  He has purpura above his left eyebrow. 

HEAD, EYES, EARS, NOSE, AND THROAT:  Otherwise unremarkable. 

THORAX:  Heart sounds S1 and S2 are equal.  No murmurs. 

LUNGS:  Relatively clear. 

ABDOMEN:  Protuberant. 

EXTREMITIES:  Show that the left knee has healed incisions, purpura and both legs have

4+ edema. 

LABORATORY DATA:  His troponins are normal.  Telemetry strips show sinus rhythm with

PVCs.  CAT scan of the leg without contrast suggests intramuscular hematoma. 

 

ASSESSMENT:  

1. Fall with contusion to the left knee and the left forehead with possible

intramuscular hematoma in the left leg. 

2. History of atrial fibrillation, but now sinus rhythm.

3. Congestive heart failure with current echocardiogram suggesting EF 30% to 35%.

4. Type 2 adult onset diabetes.

 

PLAN:  Recommend withholding anticoagulants at this time.  Especially he is in sinus

rhythm and increasing his furosemide to twice daily so that he can eliminate the 10 to

15 pounds of extra fluid.  He has not board today based on congestive heart failure and

reduced ejection fraction.  I think he could be discharged and monitored as an

outpatient. 

 

Thank you for asking me to see him again in consultation.

 

 

 

 

______________________________

MD NIRMALA Fraser/SAJAN

D:  11/30/2020 14:48:12

T:  11/30/2020 17:42:07

Job #:  [id number]

## 2020-11-30 NOTE — NUR
HOME 02 EVAL DONE AND PT QUALIFIED FOR 02 WITH SATS 86% ON ROOM AIR WITH EXERTION

CHOICE LETTER SIGNED FOR [facility] [phone]

 COPY OF CHOICE LETTER WITH MY BUSINESS CARD GIVEN TO PT

INSTRUCTED PT TO CALL [facility] AT ABOVE NUMBER WHEN HE GETS HOME TO HAVE CONCENTRATOR 
DELIVERED

CM FAXED CLINICAL TO [facility] [phone]; CONFIRMATION REC'D

NOTIFIED PADMINI LEWIS WITH [facility] OF REFERRAL AND APPROVAL GIVEN TO GIVE PT PORTABLE TANK

PORTABLE TANK DELIVERED TO PT'S ROOM FOR DISCHARGE

NURSE JERMEESE AWARE OF ABOVE

## 2020-11-30 NOTE — DISCHARGE SUMMARY
CONSULTING PHYSICIAN:  Dr. Ernst with General Surgery and Dr. Robin Waller with

Cardiology. 

 

LISTED PRIMARY CARE PHYSICIAN:  Dr. Rick Dozier.

 

OUTPATIENT ENDOCRINOLOGIST:  Dr. Grisel Leiva.

 

OUTPATIENT CARDIOLOGIST:  Dr. Barriga at [facility].

 

CHIEF COMPLAINT:  Left leg pain.

 

HISTORY OF PRESENT ILLNESS:  The patient is a 75-year-old male, admitted through the

emergency room via [facility] with complaints of left leg pain, who fell 10 days prior

to admission at the grocery store after a syncopal episode.  The patient takes Plavix

and Eliquis at home for atrial fibrillation in about 3 days prior to admission.  He

noticed bruising and swelling of the left leg with pain there.  Exacerbated with

movement and weightbearing.  Please see history and physical for past medical, surgical,

family, and social history. 

 

ALLERGIES:  HE HAS NO ALLERGIES.

 

ADMITTING DIAGNOSES:  

1. Intramuscular hematoma of the left lower extremity.

2. Witnessed syncope with fall on 11/19/2020, ambulatory dysfunction, severe

tricompartmental osteoarthritis of the right knee, history of carotid disease. 

3. Chronic diastolic congestive heart failure.

4. Paroxysmal atrial fibrillation, heart rate controlled, long-term current use of

anticoagulant Eliquis. 

5. Controlled hypertension with diastolic congestive heart failure.

6. Uncontrolled type 2 diabetes mellitus with hyperglycemia.

7. Hyperlipidemia.

8. Coronary artery disease, history of coronary stenting.

9. Obesity with BMI of 37.2, probable obstructive sleep apnea.

10. Gastroesophageal reflux disease/prophylaxis.

 

DISCHARGE DIAGNOSES:  

1. Intramuscular hematoma of the left lower extremity.

2. Witnessed syncope with fall on 11/19/2020, ambulatory dysfunction, severe

tricompartmental osteoarthritis of the right knee, history of carotid disease. 

3. Chronic diastolic congestive heart failure.

4. Paroxysmal atrial fibrillation, heart rate controlled, long-term current use of

anticoagulant Eliquis. 

5. Controlled hypertension with diastolic congestive heart failure.

6. Uncontrolled type 2 diabetes mellitus with hyperglycemia.

7. Hyperlipidemia.

8. Coronary artery disease, history of coronary stenting.

9. Obesity with BMI of 37.2, probable obstructive sleep apnea.

10. Gastroesophageal reflux disease/prophylaxis. 

Please see history and physical for admitting laboratory data.  His initial 12-lead EKG

showed sinus rhythm with first-degree AV block, multifocal PVCs and a heart rate of 78.

Chest x-ray showed diffuse interstitial and patchy airspace opacities which may

represent combination of bronchovascular crowding and subsegmental atelectasis, however,

multifocal pneumonia can have a similar appearance and should be considered in the

proper clinical context.  CT of the brain showed no acute intracranial abnormality,

specifically no acute intracranial hemorrhage.  CT of the left lower extremity showed

ill-defined mild prominence of the mid deep quadriceps muscle, suggestive of

intramuscular hematoma.  No evidence of acute extravasation.  No acute fracture or

evidence of dislocation.  Severe tricompartmental osteoarthritis of the left knee.

Considering that the patient had syncope bilateral carotid Doppler ultrasound was done,

which showed evidence of cardiac disease bilaterally without any significant carotid

stenosis.  Echocardiogram showed estimated ejection fraction of 30% to 35%.  The patient

was seen by cardiologist, Dr. Robin Waller, who increased his Lasix to 60 mg b.i.d.  The

patient does enjoy a drinking water, fluid restriction is encouraged, although he likely

will not follow at home.  The patient will be on 40 mg of Lasix b.i.d.  The patient is

to continue on 1800 calorie ADA diet.  Activity level as tolerated.  He is able to

ambulate with a walker.  He is to hold his Eliquis for 3 days to keep his left lower

extremity elevated while sitting or in bed.  On the day of discharge, vital signs;

temperature 97.8, pulse 76, blood pressure 113/84, respirations 20, oxygen saturation

96%.  He has been using 3 L of oxygen via nasal cannula during his stay and thus a home

oxygen evaluation was done.  An oxygen saturation at rest was 91%.  Oxygen saturation

with activity was 86% on exertion.  Thus, we will have home oxygen set up.  On the day

of discharge, sodium 143, potassium 4.2, chloride 106, CO2 31, BUN 26, creatinine 0.96.

WBC 8.98 (9.5), hemoglobin 12.8, hematocrit 40.2, platelets 294.  On 11/28, PT 15.7, PTT

26.3, INR 1.19.  Yesterday, total bilirubin 2.0, AST 31, ALT 35, alkaline phosphatase

59.  Per Dr. Ernst with General surgery, there was no discrete hematoma and surgery is

not required.  During his stay, B type natriuretic peptide was 465.1.  On 11/29,

hemoglobin A1c 6.8%.  Triglyceride 77, cholesterol 94, LDL 52, HDL 27.  TSH 2.165. 

 

Also patient will need to follow up with his PCP in 1-2 weeks.  Call Dr. Waller office

to set up appointment. 

 

 

Dictated by Jorje Miramontes, NP

 

______________________________

MD RICO Jacob/SAJAN

D:  11/30/2020 11:38:39

T:  11/30/2020 12:24:36

Job #:  [id number]

## 2020-11-30 NOTE — NUR
ORDERS FOR HOME HEALTH SKILLED NURSE AND HOME PT/OT

PT ALREADY HAS [facility] AND WISHES TO CONTINUE WITH THEIR SERVICES

CALLED AND SPOKE WITH BENJI AT [place]  PH: [phone]

FAXED CLINICAL TO [place] [phone]; CONFIRMATION REC'D

CHOICE LETTER  SIGNED AND ON CHART

IMM EXPLAINED TO PT, SIGNED BY PT AND PLACED ON CHART

ORDER FOR ROLLING WALKER

PAPERWORK SIGNED FOR ROLLING WALKER, COPY OF PAPERWORK TO PT

ROLLING WALKER DELIVERED TO PT IN ROOM TO TAKE HOME

NURSE DOING HOME 02 EVAL TO SEE IF PT REQUIRES HOME 02

COPY OF CHOICE LETTER AND IMM GIVEN TO PT IN CARE TRANSITIONS FOLDER

## 2020-11-30 NOTE — PROGRESS NOTE
DATE:  11/29/2020  

 

CONSULTING PHYSICIAN:  Dr. Silvio Ernst with General Surgery.

 

SUBJECTIVE:  The patient is seen out of bed in the bathroom sitting on the toilet, has

no new complaints.  He states his pain level is currently 4 to 5/10 generally.  The

patient knows to keep his left lower extremity elevated while he is in bed.  Last bowel

movement was today. 

 

OBJECTIVE:  Vital Signs:  Temperature 97.4 afebrile, pulse 50 at one point earlier today

with generally around 74, blood pressure 133/73, respirations 20, oxygen saturation 94%

on room air. 

GENERAL:  In no acute distress. 

LUNGS:  Clear to auscultation.  No supplemental oxygen at present. 

HEENT:  EOMI.  Left supraorbital hematoma noted. 

NECK:  Supple.  No lymphadenopathy, thyromegaly, or JVD. 

CARDIOVASCULAR:  Irregularly irregular rhythm.  No murmur. 

ABDOMEN:  Bowel sounds positive.  Soft, nontender, obese. 

EXTREMITIES:  With 2+ pitting edema bilateral lower extremities slightly more edematous

on the left leg.  Diffuse ecchymosis left leg.  Scattered scabs over the right lower

extremity. 

NEUROLOGICAL:  GCS 15.  Nonfocal.

 

LABORATORY DATA:  WBCs 9.5, hemoglobin 13.6, hematocrit 42.6, platelets 305,000,

neutrophils 75.6%.  Sodium 142, potassium 4.2, chloride 106, CO2 28, BUN 27, creatinine

0.93, estimated GFR greater than 60, glucose 176.  Hemoglobin A1c 6.8%.  Calcium 8.8,

phosphorus 3.3, magnesium 2.2, total bilirubin 2.0, AST 31, ALT 35, alkaline phosphatase

59, creatine kinase 243 then 219, CK-MB 6 then 5.7, troponin I 0.058 then 0.056.  Total

protein 6.1, albumin 3.1, globulin 3.0.  Triglyceride 77, cholesterol 94, LDL 52, HDL

27.  Thyroid-stimulating hormone 2.165.  Coronavirus PCR collected on 11/28 was not

detected.  Preliminary urine culture and sensitivity shows 10,000 to 50,000 CFU per mL

mixed nathan contamination. 

 

IMAGING/OTHER:  The 11/28 venous Doppler ultrasound of the left leg was negative for

DVT.  The 11/28 bilateral carotid Doppler ultrasound showed evidence of carotid disease

without stenosis.  The 11/28 echocardiogram showed an ejection fraction of 30% to 35%. 

 

ASSESSMENT/PLAN:  

1. Intramuscular hematoma of the left lower extremity.  Please see diagnostic studies

above per Dr. Ernst with General Surgery.  The patient has no discrete hematoma and

surgery is not required.  Per patient hematoma seems to be improving. 

2. Witnessed syncope with fall on 11/19/2020, ambulatory dysfunction, severe

tricompartmental osteoarthritis of the right knee, history of carotid disease.  Please

see diagnostic studies above.  Dr. Waller with Cardiology consulted to see patient as

recommended by Dr. Gokul Springer, who is covering for Dr. Martinez today.  This is

Thanksgiving holiday weekend thus physical therapy unavailable to eval and treat, they

will likely see the patient tomorrow on Monday.  Maintain fall precautions. 

3. Chronic diastolic congestive heart failure.  .1 on 11/28.  Chest x-ray showed

interstitial and patchy airspace opacities.  Continue Lasix 40 mg daily and metoprolol

succinate 100 mg daily. 

4. Paroxysmal atrial fibrillation, heart rate controlled, long-term current use of

anticoagulant Eliquis.  Monitor telemetry.  Anticoagulation continued, appreciate input

from Cardiology regarding anticoagulation as well. 

5. Per telemetry, the patient has been in atrial fibrillation and per strep had two

unifocal PVCs with a heart rate of 79 today. 

6. Controlled hypertension with diastolic congestive heart failure.  Losartan, potassium

and metoprolol succinate resumed.  Monitor BP. 

7. Uncontrolled type 2 diabetes mellitus with hyperglycemia.  Hemoglobin A1c 6.8%.

Serum glucose 176.  Monitor fingerstick blood glucose levels before meals and at

bedtime. 

8. Hyperlipidemia.  Simvastatin resumed.

9. Coronary artery disease, history of coronary stenting.  Cardiology to follow.

10. Obesity with BMI of 37.2, probable obstructive sleep apnea.  Dietary restrictions,

outpatient sleep study recommended. 

11. Gastroesophageal reflux disease/prophylaxis.  Protonix and Eliquis.

12. Inpatient, billing code [id number], time spent greater than 35 minutes.

 

 

Dictated by Jorje Miramontes, NP

 

______________________________

Higinio Martinez MD

 

HWP/MODL

D:  11/29/2020 19:38:44

T:  11/29/2020 20:58:29

Job #:  [id number]